# Patient Record
Sex: MALE | Race: BLACK OR AFRICAN AMERICAN | NOT HISPANIC OR LATINO | Employment: UNEMPLOYED | URBAN - METROPOLITAN AREA
[De-identification: names, ages, dates, MRNs, and addresses within clinical notes are randomized per-mention and may not be internally consistent; named-entity substitution may affect disease eponyms.]

---

## 2017-03-02 PROBLEM — R57.0 CARDIOGENIC SHOCK: Status: ACTIVE | Noted: 2017-03-02

## 2017-03-03 ENCOUNTER — ANESTHESIA (OUTPATIENT)
Dept: MEDSURG UNIT | Facility: HOSPITAL | Age: 37
DRG: 291 | End: 2017-03-03
Payer: MEDICARE

## 2017-03-03 ENCOUNTER — HOSPITAL ENCOUNTER (INPATIENT)
Facility: HOSPITAL | Age: 37
LOS: 21 days | Discharge: HOME HEALTH | DRG: 291 | End: 2017-03-24
Attending: INTERNAL MEDICINE | Admitting: INTERNAL MEDICINE
Payer: MEDICARE

## 2017-03-03 ENCOUNTER — ANESTHESIA EVENT (OUTPATIENT)
Dept: MEDSURG UNIT | Facility: HOSPITAL | Age: 37
DRG: 291 | End: 2017-03-03
Payer: MEDICARE

## 2017-03-03 VITALS — RESPIRATION RATE: 13 BRPM

## 2017-03-03 DIAGNOSIS — G40.909 SEIZURE DISORDER: ICD-10-CM

## 2017-03-03 DIAGNOSIS — N18.9 ACUTE ON CHRONIC RENAL INSUFFICIENCY: ICD-10-CM

## 2017-03-03 DIAGNOSIS — I50.23 ACUTE ON CHRONIC SYSTOLIC CONGESTIVE HEART FAILURE: ICD-10-CM

## 2017-03-03 DIAGNOSIS — N28.9 ACUTE ON CHRONIC RENAL INSUFFICIENCY: ICD-10-CM

## 2017-03-03 DIAGNOSIS — R57.0 CARDIOGENIC SHOCK: Primary | ICD-10-CM

## 2017-03-03 DIAGNOSIS — E88.41 MELAS (MITOCHONDRIAL ENCEPHALOPATHY, LACTIC ACIDOSIS AND STROKE-LIKE EPISODES): ICD-10-CM

## 2017-03-03 DIAGNOSIS — I48.3 TYPICAL ATRIAL FLUTTER: ICD-10-CM

## 2017-03-03 DIAGNOSIS — Z95.810 PRESENCE OF AUTOMATIC IMPLANTABLE CARDIOVERTER-DEFIBRILLATOR: ICD-10-CM

## 2017-03-03 DIAGNOSIS — I42.9 CARDIOMYOPATHY, UNSPECIFIED TYPE: Primary | ICD-10-CM

## 2017-03-03 DIAGNOSIS — R41.89 COGNITIVE IMPAIRMENT: ICD-10-CM

## 2017-03-03 DIAGNOSIS — I42.8 NICM (NONISCHEMIC CARDIOMYOPATHY): ICD-10-CM

## 2017-03-03 DIAGNOSIS — N18.4 CKD (CHRONIC KIDNEY DISEASE) STAGE 4, GFR 15-29 ML/MIN: Chronic | ICD-10-CM

## 2017-03-03 DIAGNOSIS — G81.04 FLACCID HEMIPLEGIA AFFECTING LEFT NONDOMINANT SIDE: ICD-10-CM

## 2017-03-03 PROBLEM — I48.92 ATRIAL FLUTTER: Status: ACTIVE | Noted: 2017-03-03

## 2017-03-03 PROBLEM — R74.01 TRANSAMINITIS: Status: ACTIVE | Noted: 2017-03-03

## 2017-03-03 PROBLEM — D69.6 THROMBOCYTOPENIA: Status: ACTIVE | Noted: 2017-03-03

## 2017-03-03 PROBLEM — D68.9 COAGULOPATHY: Status: ACTIVE | Noted: 2017-03-03

## 2017-03-03 PROBLEM — E88.09 HYPOALBUMINEMIA: Status: ACTIVE | Noted: 2017-03-03

## 2017-03-03 LAB
ABO + RH BLD: NORMAL
ALBUMIN SERPL BCP-MCNC: 2.7 G/DL
ALBUMIN SERPL BCP-MCNC: 2.9 G/DL
ALLENS TEST: ABNORMAL
ALP SERPL-CCNC: 163 U/L
ALP SERPL-CCNC: 173 U/L
ALT SERPL W/O P-5'-P-CCNC: 711 U/L
ALT SERPL W/O P-5'-P-CCNC: 757 U/L
ANION GAP SERPL CALC-SCNC: 13 MMOL/L
ANION GAP SERPL CALC-SCNC: 16 MMOL/L
ANISOCYTOSIS BLD QL SMEAR: SLIGHT
APTT BLDCRRT: 30.1 SEC
AST SERPL-CCNC: 484 U/L
AST SERPL-CCNC: 645 U/L
BASOPHILS # BLD AUTO: 0 K/UL
BASOPHILS NFR BLD: 0 %
BILIRUB SERPL-MCNC: 4.1 MG/DL
BILIRUB SERPL-MCNC: 4.3 MG/DL
BLD GP AB SCN CELLS X3 SERPL QL: NORMAL
BNP SERPL-MCNC: 2508 PG/ML
BUN SERPL-MCNC: 88 MG/DL
BUN SERPL-MCNC: 90 MG/DL
CALCIUM SERPL-MCNC: 8.1 MG/DL
CALCIUM SERPL-MCNC: 8.4 MG/DL
CHLORIDE SERPL-SCNC: 95 MMOL/L
CHLORIDE SERPL-SCNC: 99 MMOL/L
CO2 SERPL-SCNC: 25 MMOL/L
CO2 SERPL-SCNC: 29 MMOL/L
CREAT SERPL-MCNC: 3 MG/DL
CREAT SERPL-MCNC: 3 MG/DL
DELSYS: ABNORMAL
DIASTOLIC DYSFUNCTION: YES
DIFFERENTIAL METHOD: ABNORMAL
EOSINOPHIL # BLD AUTO: 0 K/UL
EOSINOPHIL NFR BLD: 0.2 %
ERYTHROCYTE [DISTWIDTH] IN BLOOD BY AUTOMATED COUNT: 15.2 %
ERYTHROCYTE [SEDIMENTATION RATE] IN BLOOD BY WESTERGREN METHOD: 16 MM/H
EST. GFR  (AFRICAN AMERICAN): 29.5 ML/MIN/1.73 M^2
EST. GFR  (AFRICAN AMERICAN): 29.5 ML/MIN/1.73 M^2
EST. GFR  (NON AFRICAN AMERICAN): 25.5 ML/MIN/1.73 M^2
EST. GFR  (NON AFRICAN AMERICAN): 25.5 ML/MIN/1.73 M^2
ESTIMATED PA SYSTOLIC PRESSURE: 37
FACT X PPP CHRO-ACNC: 0.66 IU/ML
FIO2: 21
FLOW: 0
GLOBAL PERICARDIAL EFFUSION: ABNORMAL
GLUCOSE SERPL-MCNC: 161 MG/DL
GLUCOSE SERPL-MCNC: 208 MG/DL
HCO3 UR-SCNC: 31.9 MMOL/L (ref 24–28)
HCO3 UR-SCNC: 33.6 MMOL/L (ref 24–28)
HCO3 UR-SCNC: 35.6 MMOL/L (ref 24–28)
HCT VFR BLD AUTO: 37.7 %
HGB BLD-MCNC: 12.9 G/DL
HYPOCHROMIA BLD QL SMEAR: ABNORMAL
INR PPP: 1.7
LACTATE SERPL-SCNC: 1.8 MMOL/L
LYMPHOCYTES # BLD AUTO: 1.2 K/UL
LYMPHOCYTES NFR BLD: 9.9 %
MAGNESIUM SERPL-MCNC: 2 MG/DL
MCH RBC QN AUTO: 30.6 PG
MCHC RBC AUTO-ENTMCNC: 34.2 %
MCV RBC AUTO: 90 FL
METHEMOGLOBIN: 0.6 % (ref 0–3)
MITRAL VALVE REGURGITATION: ABNORMAL
MODE: ABNORMAL
MONOCYTES # BLD AUTO: 1.5 K/UL
MONOCYTES NFR BLD: 12.3 %
NEUTROPHILS # BLD AUTO: 9.6 K/UL
NEUTROPHILS NFR BLD: 77.2 %
PCO2 BLDA: 49.1 MMHG (ref 35–45)
PCO2 BLDA: 50.8 MMHG (ref 35–45)
PCO2 BLDA: 57.8 MMHG (ref 35–45)
PH SMN: 7.4 [PH] (ref 7.35–7.45)
PH SMN: 7.42 [PH] (ref 7.35–7.45)
PH SMN: 7.43 [PH] (ref 7.35–7.45)
PHOSPHATE SERPL-MCNC: 3.8 MG/DL
PLATELET # BLD AUTO: 71 K/UL
PLATELET BLD QL SMEAR: ABNORMAL
PMV BLD AUTO: ABNORMAL FL
PO2 BLDA: 26 MMHG (ref 40–60)
PO2 BLDA: 27 MMHG (ref 40–60)
PO2 BLDA: 28 MMHG (ref 40–60)
POC BE: 11 MMOL/L
POC BE: 7 MMOL/L
POC BE: 9 MMOL/L
POC SATURATED O2: 47 % (ref 95–100)
POC SATURATED O2: 48 % (ref 95–100)
POC SATURATED O2: 53 % (ref 95–100)
POC TCO2: 33 MMOL/L (ref 24–29)
POC TCO2: 35 MMOL/L (ref 24–29)
POC TCO2: 37 MMOL/L (ref 24–29)
POLYCHROMASIA BLD QL SMEAR: ABNORMAL
POTASSIUM SERPL-SCNC: 4.4 MMOL/L
POTASSIUM SERPL-SCNC: 4.5 MMOL/L
PROT SERPL-MCNC: 5.4 G/DL
PROT SERPL-MCNC: 5.6 G/DL
PROTHROMBIN TIME: 16.8 SEC
RBC # BLD AUTO: 4.21 M/UL
RETIRED EF AND QEF - SEE NOTES: 10 (ref 55–65)
SAMPLE: ABNORMAL
SITE: ABNORMAL
SODIUM SERPL-SCNC: 137 MMOL/L
SODIUM SERPL-SCNC: 140 MMOL/L
TRICUSPID VALVE REGURGITATION: ABNORMAL
WBC # BLD AUTO: 12.42 K/UL

## 2017-03-03 PROCEDURE — 80053 COMPREHEN METABOLIC PANEL: CPT

## 2017-03-03 PROCEDURE — 25000003 PHARM REV CODE 250: Performed by: INTERNAL MEDICINE

## 2017-03-03 PROCEDURE — 93282 PRGRMG EVAL IMPLANTABLE DFB: CPT

## 2017-03-03 PROCEDURE — 25000003 PHARM REV CODE 250: Performed by: NURSE ANESTHETIST, CERTIFIED REGISTERED

## 2017-03-03 PROCEDURE — 63600175 PHARM REV CODE 636 W HCPCS: Performed by: INTERNAL MEDICINE

## 2017-03-03 PROCEDURE — 85025 COMPLETE CBC W/AUTO DIFF WBC: CPT

## 2017-03-03 PROCEDURE — 84100 ASSAY OF PHOSPHORUS: CPT

## 2017-03-03 PROCEDURE — C8929 TTE W OR WO FOL WCON,DOPPLER: HCPCS

## 2017-03-03 PROCEDURE — 37000009 HC ANESTHESIA EA ADD 15 MINS: Performed by: INTERNAL MEDICINE

## 2017-03-03 PROCEDURE — 99233 SBSQ HOSP IP/OBS HIGH 50: CPT | Mod: ,,, | Performed by: INTERNAL MEDICINE

## 2017-03-03 PROCEDURE — 37000008 HC ANESTHESIA 1ST 15 MINUTES: Performed by: INTERNAL MEDICINE

## 2017-03-03 PROCEDURE — 93306 TTE W/DOPPLER COMPLETE: CPT | Mod: 26,,, | Performed by: INTERNAL MEDICINE

## 2017-03-03 PROCEDURE — 80053 COMPREHEN METABOLIC PANEL: CPT | Mod: 91

## 2017-03-03 PROCEDURE — 99223 1ST HOSP IP/OBS HIGH 75: CPT | Mod: ,,, | Performed by: INTERNAL MEDICINE

## 2017-03-03 PROCEDURE — D9220A PRA ANESTHESIA: Mod: CRNA,,, | Performed by: NURSE ANESTHETIST, CERTIFIED REGISTERED

## 2017-03-03 PROCEDURE — 83735 ASSAY OF MAGNESIUM: CPT

## 2017-03-03 PROCEDURE — D9220A PRA ANESTHESIA: Mod: ANES,,, | Performed by: ANESTHESIOLOGY

## 2017-03-03 PROCEDURE — 93282 PRGRMG EVAL IMPLANTABLE DFB: CPT | Mod: 26,,, | Performed by: INTERNAL MEDICINE

## 2017-03-03 PROCEDURE — 93325 DOPPLER ECHO COLOR FLOW MAPG: CPT

## 2017-03-03 PROCEDURE — 63600175 PHARM REV CODE 636 W HCPCS

## 2017-03-03 PROCEDURE — 83605 ASSAY OF LACTIC ACID: CPT

## 2017-03-03 PROCEDURE — 86850 RBC ANTIBODY SCREEN: CPT

## 2017-03-03 PROCEDURE — 85610 PROTHROMBIN TIME: CPT

## 2017-03-03 PROCEDURE — 82803 BLOOD GASES ANY COMBINATION: CPT

## 2017-03-03 PROCEDURE — 27100094 HC IABP, SET-UP

## 2017-03-03 PROCEDURE — 63600367 HC NITRIC OXIDE PER HOUR

## 2017-03-03 PROCEDURE — 94761 N-INVAS EAR/PLS OXIMETRY MLT: CPT

## 2017-03-03 PROCEDURE — 93325 DOPPLER ECHO COLOR FLOW MAPG: CPT | Mod: 26,,, | Performed by: INTERNAL MEDICINE

## 2017-03-03 PROCEDURE — 99900035 HC TECH TIME PER 15 MIN (STAT)

## 2017-03-03 PROCEDURE — 5A2204Z RESTORATION OF CARDIAC RHYTHM, SINGLE: ICD-10-PCS | Performed by: INTERNAL MEDICINE

## 2017-03-03 PROCEDURE — 85520 HEPARIN ASSAY: CPT

## 2017-03-03 PROCEDURE — 86900 BLOOD TYPING SEROLOGIC ABO: CPT

## 2017-03-03 PROCEDURE — 63600175 PHARM REV CODE 636 W HCPCS: Performed by: NURSE ANESTHETIST, CERTIFIED REGISTERED

## 2017-03-03 PROCEDURE — 83880 ASSAY OF NATRIURETIC PEPTIDE: CPT

## 2017-03-03 PROCEDURE — 93320 DOPPLER ECHO COMPLETE: CPT | Mod: 26,,, | Performed by: INTERNAL MEDICINE

## 2017-03-03 PROCEDURE — 25000003 PHARM REV CODE 250

## 2017-03-03 PROCEDURE — 93010 ELECTROCARDIOGRAM REPORT: CPT | Mod: 76,,, | Performed by: INTERNAL MEDICINE

## 2017-03-03 PROCEDURE — 93312 ECHO TRANSESOPHAGEAL: CPT | Mod: 26,,, | Performed by: INTERNAL MEDICINE

## 2017-03-03 PROCEDURE — 99223 1ST HOSP IP/OBS HIGH 75: CPT | Mod: GC,,, | Performed by: PSYCHIATRY & NEUROLOGY

## 2017-03-03 PROCEDURE — 85730 THROMBOPLASTIN TIME PARTIAL: CPT

## 2017-03-03 PROCEDURE — C9113 INJ PANTOPRAZOLE SODIUM, VIA: HCPCS | Performed by: INTERNAL MEDICINE

## 2017-03-03 PROCEDURE — 20000000 HC ICU ROOM

## 2017-03-03 RX ORDER — FENTANYL CITRATE 50 UG/ML
INJECTION, SOLUTION INTRAMUSCULAR; INTRAVENOUS
Status: DISCONTINUED | OUTPATIENT
Start: 2017-03-03 | End: 2017-03-03

## 2017-03-03 RX ORDER — PHENYLEPHRINE HYDROCHLORIDE 10 MG/ML
INJECTION INTRAVENOUS
Status: COMPLETED
Start: 2017-03-03 | End: 2017-03-03

## 2017-03-03 RX ORDER — KETAMINE HYDROCHLORIDE 100 MG/ML
INJECTION, SOLUTION INTRAMUSCULAR; INTRAVENOUS
Status: DISCONTINUED | OUTPATIENT
Start: 2017-03-03 | End: 2017-03-03

## 2017-03-03 RX ORDER — DOBUTAMINE HYDROCHLORIDE 400 MG/100ML
5 INJECTION INTRAVENOUS CONTINUOUS
Status: DISCONTINUED | OUTPATIENT
Start: 2017-03-03 | End: 2017-03-07

## 2017-03-03 RX ORDER — DOBUTAMINE HYDROCHLORIDE 400 MG/100ML
5 INJECTION, SOLUTION INTRAVENOUS CONTINUOUS
Status: DISCONTINUED | OUTPATIENT
Start: 2017-03-03 | End: 2017-03-03

## 2017-03-03 RX ORDER — PROPOFOL 10 MG/ML
VIAL (ML) INTRAVENOUS
Status: DISCONTINUED | OUTPATIENT
Start: 2017-03-03 | End: 2017-03-03

## 2017-03-03 RX ORDER — PANTOPRAZOLE SODIUM 40 MG/10ML
40 INJECTION, POWDER, LYOPHILIZED, FOR SOLUTION INTRAVENOUS DAILY
Status: DISCONTINUED | OUTPATIENT
Start: 2017-03-03 | End: 2017-03-06

## 2017-03-03 RX ORDER — SODIUM CHLORIDE 0.9 % (FLUSH) 0.9 %
3 SYRINGE (ML) INJECTION EVERY 8 HOURS
Status: DISCONTINUED | OUTPATIENT
Start: 2017-03-03 | End: 2017-03-24 | Stop reason: HOSPADM

## 2017-03-03 RX ORDER — SODIUM CHLORIDE 9 MG/ML
INJECTION, SOLUTION INTRAVENOUS CONTINUOUS PRN
Status: DISCONTINUED | OUTPATIENT
Start: 2017-03-03 | End: 2017-03-03

## 2017-03-03 RX ORDER — EPINEPHRINE 0.22MG
400 AEROSOL WITH ADAPTER (ML) INHALATION DAILY
Status: DISCONTINUED | OUTPATIENT
Start: 2017-03-03 | End: 2017-03-24 | Stop reason: HOSPADM

## 2017-03-03 RX ORDER — LEVOCARNITINE ORAL SOLUTION (SUGUAR FREE) 1 G/10 ML 1 G/10ML
330 SOLUTION ORAL 2 TIMES DAILY
Status: DISCONTINUED | OUTPATIENT
Start: 2017-03-03 | End: 2017-03-24 | Stop reason: HOSPADM

## 2017-03-03 RX ORDER — LEVETIRACETAM 750 MG/1
750 TABLET ORAL 2 TIMES DAILY
Status: DISCONTINUED | OUTPATIENT
Start: 2017-03-03 | End: 2017-03-24 | Stop reason: HOSPADM

## 2017-03-03 RX ORDER — FUROSEMIDE 10 MG/ML
80 INJECTION INTRAMUSCULAR; INTRAVENOUS ONCE
Status: COMPLETED | OUTPATIENT
Start: 2017-03-03 | End: 2017-03-03

## 2017-03-03 RX ORDER — LEVOCARNITINE 1 G/10ML
330 SOLUTION ORAL 2 TIMES DAILY
Status: DISCONTINUED | OUTPATIENT
Start: 2017-03-03 | End: 2017-03-03

## 2017-03-03 RX ORDER — HEPARIN SODIUM,PORCINE/D5W 25000/250
17 INTRAVENOUS SOLUTION INTRAVENOUS CONTINUOUS
Status: DISCONTINUED | OUTPATIENT
Start: 2017-03-03 | End: 2017-03-19

## 2017-03-03 RX ADMIN — FUROSEMIDE 80 MG: 10 INJECTION, SOLUTION INTRAMUSCULAR; INTRAVENOUS at 07:03

## 2017-03-03 RX ADMIN — EPINEPHRINE 0.04 MCG/KG/MIN: 1 INJECTION PARENTERAL at 03:03

## 2017-03-03 RX ADMIN — PROPOFOL 10 MG: 10 INJECTION, EMULSION INTRAVENOUS at 11:03

## 2017-03-03 RX ADMIN — LEVOCARNITINE 3.3 ML: 1 SOLUTION ORAL at 11:03

## 2017-03-03 RX ADMIN — Medication 3 ML: at 10:03

## 2017-03-03 RX ADMIN — SODIUM CHLORIDE: 0.9 INJECTION, SOLUTION INTRAVENOUS at 11:03

## 2017-03-03 RX ADMIN — SODIUM CHLORIDE 20 MG/HR: 900 INJECTION, SOLUTION INTRAVENOUS at 11:03

## 2017-03-03 RX ADMIN — Medication 3 ML: at 02:03

## 2017-03-03 RX ADMIN — HEPARIN SODIUM AND DEXTROSE 17 UNITS/KG/HR: 10000; 5 INJECTION INTRAVENOUS at 07:03

## 2017-03-03 RX ADMIN — SODIUM CHLORIDE 20 MG/HR: 900 INJECTION, SOLUTION INTRAVENOUS at 07:03

## 2017-03-03 RX ADMIN — LEVETIRACETAM 750 MG: 750 TABLET, FILM COATED ORAL at 09:03

## 2017-03-03 RX ADMIN — PHENYLEPHRINE HYDROCHLORIDE: 10 INJECTION INTRAVENOUS at 07:03

## 2017-03-03 RX ADMIN — PANTOPRAZOLE SODIUM 40 MG: 40 INJECTION, POWDER, FOR SOLUTION INTRAVENOUS at 09:03

## 2017-03-03 RX ADMIN — FENTANYL CITRATE 100 MCG: 50 INJECTION, SOLUTION INTRAMUSCULAR; INTRAVENOUS at 11:03

## 2017-03-03 RX ADMIN — Medication 3 ML: at 07:03

## 2017-03-03 RX ADMIN — DOBUTAMINE IN DEXTROSE 5 MCG/KG/MIN: 400 INJECTION, SOLUTION INTRAVENOUS at 07:03

## 2017-03-03 RX ADMIN — KETAMINE HYDROCHLORIDE 20 MG: 100 INJECTION, SOLUTION, CONCENTRATE INTRAMUSCULAR; INTRAVENOUS at 11:03

## 2017-03-03 RX ADMIN — HEPARIN SODIUM AND DEXTROSE 17 UNITS/KG/HR: 10000; 5 INJECTION INTRAVENOUS at 09:03

## 2017-03-03 NOTE — CONSULTS
[facility]  Vascular Neurology  Comprehensive Stroke Center  Consult Note      Consults  Subjective:     History of Present Illness:  Mr. Conde is a 37 yo male with history of HFr HFrEF (EF <10%) 2/2 NICM s/p AICD (implanted 12/2013), MELAS (mitochondrial encephalopathy, lactic acidosis, and stroke-like episodes), Hx of LV thrombus, Atrial Flutter, CKD, seizure disorder who was transferred to [facility] from [facility] in cardiogenic shock and consideration for heart transplant and/or advanced options. Vascular neurology consulted evaluation for LVAD/ Transplant candidacy.     He was admitted to [facility] on 2/28 after being found down. CT head done there showed no acute CVA or hemorrhage and an old R PCA stroke. He was found to be in cardiogenic shock with hypotension and volume overload and transferred to [facility] for consideration for heart transplant vs advanced options.     He is followed by Dr. Ruiz with neurology at [facility]. Per chart review he was diagnosed with MELAS in around 1999 after multiple stroke like episodes.  Definitive studies included MRS (lactate peak) and a muscle biopsy positive for ragged red fibers. He was started on CoQ and levocarnitine and initially did well until several years ago when he developed heart failure. According to Dr. Ruiz's note at [facility] on 3/2 there has not been any other significant system or organ involvement besides brain and heart. He has also not had stroke like episodes in more than 10 years. He also takes Keppra for seizures.     Upon my evaluation patient patient was tachy ~ 160's and being cardioverted by the cardiology team. TTE performed prior was negative for thrombus.       Social History   Substance Use Topics    Smoking status: None    Smokeless tobacco: None    Alcohol use None     Review of patient's allergies indicates:  No Known Allergies  Medications: I have reviewed the current medication administration record.    No prescriptions prior to admission.        Review of Systems   Unable to perform ROS: Unstable vital signs     Objective:     Vital Signs (Most Recent):  Temp: 97.1 °F (36.2 °C) (03/03/17 1200)  Pulse: 83 (03/03/17 1300)  Resp: 12 (03/03/17 1300)  BP: (!) 97/53 (03/03/17 0500)  SpO2: 100 % (03/03/17 1300)    Vital Signs Range (Last 24H):  Temp:  [97.1 °F (36.2 °C)-98.9 °F (37.2 °C)]   Pulse:  []   Resp:  [7-31]   BP: ()/(53-70)   SpO2:  [94 %-100 %]   Arterial Line BP: ()/(45-54)     Physical Exam   Constitutional: He appears distressed.   HENT:   Head: Normocephalic and atraumatic.   Cardiovascular:   Tachycardic to 160's    Neurological: GCS eye subscore is 4 - spontaneous. GCS verbal subscore is 5 - oriented. GCS motor subscore is 6 - obeys commands.   Unable to assess due to unstable vital signs and cardioversion taking place.            Hussein Coma Scale:  Best Eye Response: 4 - spontaneous  Best Motor Response: 6 - obeys commands  Best Verbal Response: 5 - oriented  Hussein Coma Scale Total: 15      Laboratory:  CMP:   Recent Labs  Lab 03/03/17  0630   CALCIUM 8.1*   ALBUMIN 2.7*   PROT 5.4*      K 4.5   CO2 25   CL 99   BUN 90*   CREATININE 3.0*   ALKPHOS 163*   *   *   BILITOT 4.1*     CBC:   Recent Labs  Lab 03/03/17  0630   WBC 12.42   RBC 4.21*   HGB 12.9*   HCT 37.7*   PLT 71*   MCV 90   MCH 30.6   MCHC 34.2     Lipid Panel: No results for input(s): CHOL, LDLCALC, HDL, TRIG in the last 168 hours.  Coagulation:   Recent Labs  Lab 03/03/17  0630   INR 1.7*   APTT 30.1     Hgb A1C: No results for input(s): HGBA1C in the last 168 hours.  TSH: No results for input(s): TSH in the last 168 hours.    Diagnostic Results:  Brain Imaging: None on file.     Cerebrovascular Imaging: None on file.     Cervical Vascular Imaging: None on file.     Cardiac Evaluation:   RENÉE 3/3:   CONCLUSIONS     1 - Enlarged left ventricular enlargement.     2 - Severely depressed left ventricular systolic function (EF 10 % or less).      3 - Moderately depressed right ventricular function .     4 - Moderate mitral regurgitation.     5 - Moderate to severe tricuspid regurgitation.     6 - Left atrial appendage is single lobed with no visualized thrombus.     7 - Biatrial enlargement.     EKG 3/3: Atrial flutter with 2:1 A-V conduction HR ~ 140     Assessment/Plan:     Patient is a 36 y.o. year old male with:    * MELAS (mitochondrial encephalopathy, lactic acidosis and stroke-like episodes)  Patient with history of MELAS now with with cardiogenic shock 2/2 HFrEF from [facility] transferred to [facility] for consideration for heart transplant and/or advanced options. Vascular neurology consulted for candidacy for these options.     Recommendations:   -After reviewing the very limited literature available on the topic there seems to be no contraindication for transplant or advanced options  in this patient from vascular neurology perspective. Does not seem to be significant systemic or organ involvement besides brain and heart.   -continue CoQ and levocarnitine  -monitor neuro exam closely, do not hesitate to call with any changes in exam    Thank you for the consult.     HOLLY Veras  Comprehensive Stroke Center  Department of Vascular Neurology   [facility]

## 2017-03-03 NOTE — PLAN OF CARE
Problem: Patient Care Overview  Goal: Plan of Care Review  Outcome: Ongoing (interventions implemented as appropriate)  Plan of care updated and reviewed with pt. VS and assessments per flowsheet. No complaints of chest pain. Pt on Dobut, Lasix and Heparin. IABP in R groin 1:1, ECG, Auto, Aug 80s. Site free of redness, swelling, and/or hematoma. Continue to monitor.

## 2017-03-03 NOTE — PROGRESS NOTES
At the request of Dr. Carpenter, I have been asked to meet patient and provide VAD education. Introduced self and reason for visit. Pt sedated with no family at bedside.  Provided written VAD education (red folder). Included in folder is the following: VAD education, wellness contract, acknowledgement of being evaluated for VAD, support group flier, ICU visitation hours, VAD newsletter, Intermacs information, picture of VAD  In body, [employer] pamphlet, Living with HM II DVD, There is hope pamphlet, Tomorrow depends on the decision we make today patient education pack (HM II), [employer] information, and business cards for all VAD coordinators.      Red folder was placed on the bedside table.

## 2017-03-03 NOTE — SUBJECTIVE & OBJECTIVE
History reviewed. No pertinent past medical history.  History reviewed. No pertinent surgical history.  History reviewed. No pertinent family history.  Social History   Substance Use Topics    Smoking status: None    Smokeless tobacco: None    Alcohol use None     Review of patient's allergies indicates:  No Known Allergies  Medications: I have reviewed the current medication administration record.    No prescriptions prior to admission.       Review of Systems   Unable to perform ROS: Unstable vital signs     Objective:     Vital Signs (Most Recent):  Temp: 97.1 °F (36.2 °C) (03/03/17 1200)  Pulse: 83 (03/03/17 1300)  Resp: 12 (03/03/17 1300)  BP: (!) 97/53 (03/03/17 0500)  SpO2: 100 % (03/03/17 1300)    Vital Signs Range (Last 24H):  Temp:  [97.1 °F (36.2 °C)-98.9 °F (37.2 °C)]   Pulse:  []   Resp:  [7-31]   BP: ()/(53-70)   SpO2:  [94 %-100 %]   Arterial Line BP: ()/(45-54)     Physical Exam   Constitutional: He appears distressed.   HENT:   Head: Normocephalic and atraumatic.   Cardiovascular:   Tachycardic to 160's    Neurological: GCS eye subscore is 4 - spontaneous. GCS verbal subscore is 5 - oriented. GCS motor subscore is 6 - obeys commands.   Unable to assess due to unstable vital signs and cardioversion taking place.            Hussein Coma Scale:  Best Eye Response: 4 - spontaneous  Best Motor Response: 6 - obeys commands  Best Verbal Response: 5 - oriented  Hussein Coma Scale Total: 15      Laboratory:  CMP:   Recent Labs  Lab 03/03/17  0630   CALCIUM 8.1*   ALBUMIN 2.7*   PROT 5.4*      K 4.5   CO2 25   CL 99   BUN 90*   CREATININE 3.0*   ALKPHOS 163*   *   *   BILITOT 4.1*     CBC:   Recent Labs  Lab 03/03/17  0630   WBC 12.42   RBC 4.21*   HGB 12.9*   HCT 37.7*   PLT 71*   MCV 90   MCH 30.6   MCHC 34.2     Lipid Panel: No results for input(s): CHOL, LDLCALC, HDL, TRIG in the last 168 hours.  Coagulation:   Recent Labs  Lab 03/03/17  0630   INR 1.7*   APTT  30.1     Hgb A1C: No results for input(s): HGBA1C in the last 168 hours.  TSH: No results for input(s): TSH in the last 168 hours.    Diagnostic Results:  Brain Imaging: None on file.     Cerebrovascular Imaging: None on file.     Cervical Vascular Imaging: None on file.     Cardiac Evaluation:   RENÉE 3/3:   CONCLUSIONS     1 - Enlarged left ventricular enlargement.     2 - Severely depressed left ventricular systolic function (EF 10 % or less).     3 - Moderately depressed right ventricular function .     4 - Moderate mitral regurgitation.     5 - Moderate to severe tricuspid regurgitation.     6 - Left atrial appendage is single lobed with no visualized thrombus.     7 - Biatrial enlargement.     EKG 3/3: Atrial flutter with 2:1 A-V conduction HR ~ 140

## 2017-03-03 NOTE — PROGRESS NOTES
Administered definity contrast to assist with Echo. Allergies verified and consent obtained from Deisi Sewell (sister) by phone. NAD noted. IV flushed.

## 2017-03-03 NOTE — PROGRESS NOTES
Brief Note    See H&P from this AM for further details. Patient was successfully cardioverted to NSR this AM after RENÉE ruled out thrombus. Oral airway in place, O2 sats dropped into mid 80s on 3L and MAP down to high 50s after initial improvement post DCCV. Likely related to sedatives. Will continue to monitor    IABP back to 1:1 now that not in flutter. Lasix at 20mg/hr +  with outstanding UOP. Last CVP 11. SvO2 45.    Neurology consulted for ?candidacy for possible advanced options.    Will check axillary US for possibility of axillary IABP in future to encourage ambulation.    Savanah CALLAHAN

## 2017-03-03 NOTE — TRANSFER OF CARE
Anesthesia Transfer of Care Note    Patient: Binh Conde    Procedure(s) Performed: Procedure(s) (LRB):  CARDIOVERSION (N/A)    Patient location: ICU    Anesthesia Type: general    Transport from OR: Transported from OR on 2-3 L/min O2 by NC with adequate spontaneous ventilation    Post pain: adequate analgesia    Post assessment: no apparent anesthetic complications and tolerated procedure well    Post vital signs: stable    Level of consciousness: sedated    Nausea/Vomiting: no nausea/vomiting    Complications: none          Last vitals:   Visit Vitals    BP (!) 97/53    Pulse (!) 140    Temp 37.2 °C (98.9 °F) (Core)    Resp 17    Ht 6' (1.829 m)    Wt 63.4 kg (139 lb 12.4 oz)    SpO2 100%    BMI 18.96 kg/m2

## 2017-03-03 NOTE — CONSULTS
Electrophysiology Consult Note  Attending Physician: Romelia Carpenter MD  Reason for Consult: Typical flutter     HPI:   36 y.o.  male with PMH of HFrEF (EF <10%)  NICM s/p ICD (implanted 12/2013), MELAS (mitochondrial encephalopathy, lactic acidosis, and stroke-like episodes), Hx of LV thrombus, CKD, seizure disorder, who is now transferred to [facility] from [facility] in cardiogenic shock and consideration for heart transplant and/or advanced options.EP consulted typical AFlutter 2:1 AV conduction rates 120's to 140's.Patient had RENÉE+DCCV today with restoration of NSR with 1:1 conduction, device interrogated and he has single lead ICD with no shocks.Currently patient MAP in 60's he is on DBT@5, IABP 2:1, heparin gtt and lasix gtt.     ROS:    Unable to assess patient still under effect of sedation from RENÉE  PMH:   History reviewed. No pertinent past medical history.  History reviewed. No pertinent surgical history.  Allergies:   Review of patient's allergies indicates:  No Known Allergies  Medications:     No current facility-administered medications on file prior to encounter.      No current outpatient prescriptions on file prior to encounter.       Inpatient Medications   Continuous Infusions:   DOBUTamine 5 mcg/kg/min (03/03/17 1400)    furosemide (LASIX) 5 mg/mL infusion (non-titrating) 20 mg/hr (03/03/17 1400)    heparin (porcine) in D5W 17 Units/kg/hr (03/03/17 1400)     Scheduled Meds:   coenzyme Q10  400 mg Oral Daily    levetiracetam  750 mg Oral BID    levocarnitine  330 mg Oral BID    pantoprazole  40 mg Intravenous Daily    sodium chloride 0.9%  3 mL Intravenous Q8H     PRN Meds:heparin (PORCINE), heparin (PORCINE)     Social History:     Social History   Substance Use Topics    Smoking status: Not on file    Smokeless tobacco: Not on file    Alcohol use Not on file     Family History:   History reviewed. No pertinent family history.  Physical Exam:     Vitals:  Temp:  [97.1 °F  (36.2 °C)-98.9 °F (37.2 °C)]   Pulse:  []   Resp:  [7-31]   BP: ()/(53-70)   SpO2:  [94 %-100 %]   Arterial Line BP: ()/(45-54)  on  I/O's:    Intake/Output Summary (Last 24 hours) at 03/03/17 1444  Last data filed at 03/03/17 1432   Gross per 24 hour   Intake           361.76 ml   Output             3770 ml   Net         -3408.24 ml        Constitutional: AMS secondary to sedation effect from RENÉE  HEENT: Sclera anicteric, PERRLA, EOMI  Neck: 10-12 cm JVD, no carotid bruits  CV: RRR, no murmur, normal S1/S2  Pulm: CTAB, no wheezes, rales, or ronchi  GI: Abdomen soft, NTND, +BS  Extremities: No LE edema, warm and well perfused  Skin: No ecchymosis, erythema, or ulcers  Psych: AOx3, appropriate affect  Neuro: CNII-XII intact, no focal deficits    Labs:       Recent Labs  Lab 03/03/17  0630      K 4.5   CL 99   CO2 25   BUN 90*   CREATININE 3.0*   ANIONGAP 13       Recent Labs  Lab 03/03/17  0630   *   *   ALKPHOS 163*   BILITOT 4.1*   ALBUMIN 2.7*       Recent Labs  Lab 03/03/17  0630   BNP 2508*      Recent Labs  Lab 03/03/17  0630   WBC 12.42   HGB 12.9*   HCT 37.7*   PLT 71*   GRAN 77.2*  9.6*       Recent Labs  Lab 03/03/17  0630   INR 1.7*     No results found for: CHOL, HDL, LDLCALC, TRIG  No results found for: HGBA1C     Micro:  Blood Cultures  No results found for: LABBLOO  Urine Cultures  No results found for: LABURIN    Imaging:         EF   Date Value Ref Range Status   03/03/2017 10 (A) 55 - 65    03/03/2017 10 (A) 55 - 65          Telemetry: NSR with 1:1 conduction HR in 90's    Assessment&Plan:   36 y.o. gentleman with PMH of HFrEF (EF <10%) 2/2 NICM s/p ICD (implanted 12/2013), MELAS (mitochondrial encephalopathy, lactic acidosis, and stroke-like episodes), Hx of LV thrombus,CKD, seizure disorder, who is now transferred to [facility] from [facility] in cardiogenic shock and consideration for heart transplant and/or advanced options.EP consulted for typical Aflutter with 2:1  block:    -S/P successful RENÉE+DCCV   -Continue on anticoagulation   -ICD interrogated and he has [employer] single lead ICD with no prior shocks and some run on NSVT   -Will need CTI RFA for typical AFlutter when more stable prior to discharge   -EP will follow            Thank you for your consult     Attending addendum to follow         Lazaro Harden MD  Cardiology Fellow  Pager: [phone]

## 2017-03-03 NOTE — PROGRESS NOTES
SvO2 remains 48. Will add 20ppm nitric oxide and epinephrine at 0.04. Remains NSR, MAP in mid 70s    Savanah CALLAHAN

## 2017-03-03 NOTE — ASSESSMENT & PLAN NOTE
Patient with history of MELAS transferred with cardiogenic shock 2/2 HFrEF from [facility] for consideration for heart transplant and/or advanced options. Vascular neurology consulted for candidacy for these options.   Recommendations:   -After reviewing the very limited literature available on the topic there seems to be no contraindication for transplant or advanced options  in this patient from vascular neurology perspective. Does not seem to be significant systemic or organ involvement besides brain and heart.   -continue CoQ and levocarnitine  -monitor neuro exam closely, do not hesitate to call with any changes in exam

## 2017-03-03 NOTE — PROGRESS NOTES
RENÉE was negative for ALLA thrombus and patient was successfully cardioverted to NSR. Patient tolerated procedure well.

## 2017-03-03 NOTE — IP AVS SNAPSHOT
[facility]  [address]  [facility] [address]  Phone: [phone]           Patient Discharge Instructions     Our goal is to set you up for success. This packet includes information on your condition, medications, and your home care. It will help you to care for yourself so you don't get sicker and need to go back to the hospital.     Please ask your nurse if you have any questions.        There are many details to remember when preparing to leave the hospital. Here is what you will need to do:    1. Take your medicine. If you are prescribed medications, review your Medication List in the following pages. You may have new medications to  at the pharmacy and others that you'll need to stop taking. Review the instructions for how and when to take your medications. Talk with your doctor or nurses if you are unsure of what to do.     2. Go to your follow-up appointments. Specific follow-up information is listed in the following pages. Your may be contacted by a transition nurse or clinical provider about future appointments. Be sure we have all of the phone numbers to reach you, if needed. Please contact your provider's office if you are unable to make an appointment.     3. Watch for warning signs. Your doctor or nurse will give you detailed warning signs to watch for and when to call for assistance. These instructions may also include educational information about your condition. If you experience any of warning signs to your health, call your doctor.               [facility]  Unless otherwise directed by your provider, please contact [facility], our nurse care line that is available for 24/7 assistance.     [phone] (toll-free)    Registered nurses in the [facility] provide clinical advisement, health education, appointment booking, and other advisory services.                    ** Verify the list of medication(s) below is accurate and up  to date. Carry this with you in case of emergency. If your medications have changed, please notify your healthcare provider.             Medication List      START taking these medications        Additional Info    Begin Date AM Noon PM Bedtime    bumetanide 2 MG tablet   Commonly known as:  BUMEX   Quantity:  90 tablet   Refills:  3   Dose:  2 mg    Last time this was given:  2 mg on 3/21/2017  9:09 AM   Instructions:  Take 1 tablet (2 mg total) by mouth once daily.                               coenzyme Q10 100 mg capsule   Refills:  0   Dose:  400 mg    Last time this was given:  400 mg on 3/24/2017  9:39 AM   Instructions:  Take 4 capsules (400 mg total) by mouth once daily.                               digoxin 125 mcg tablet   Commonly known as:  LANOXIN   Quantity:  36 tablet   Refills:  3   Dose:  0.125 mg    Last time this was given:  0.125 mg on 3/24/2017  4:52 PM   Instructions:  Take 1 tablet (0.125 mg total) by mouth every Mon, Wed, Fri.            Monday morning                   DOBUTamine 1,000 mg/250 mL (4,000 mcg/mL) infusion   Commonly known as:  DOBUTREX   Refills:  0   Dose:  2.5 mcg/kg/min    Last time this was given:  2.5 mcg/kg/min on 3/20/2017  5:17 PM   Instructions:  Inject 162.25 mcg/min into the vein continuous.                            gabapentin 300 MG capsule   Commonly known as:  NEURONTIN   Quantity:  90 capsule   Refills:  3   Dose:  300 mg    Last time this was given:  300 mg on 3/23/2017 10:06 PM   Instructions:  Take 1 capsule (300 mg total) by mouth every evening.                               hydrALAZINE 50 MG tablet   Commonly known as:  APRESOLINE   Quantity:  360 tablet   Refills:  3   Dose:  50 mg   Indications:  Chronic Heart Failure    Last time this was given:  50 mg on 3/24/2017  4:52 PM   Instructions:  Take 1 tablet (50 mg total) by mouth 4 (four) times daily with meals and nightly.                               isosorbide dinitrate 20 MG tablet   Commonly known  as:  ISORDIL   Quantity:  360 tablet   Refills:  3   Dose:  20 mg   Indications:  Chronic Heart Failure    Last time this was given:  20 mg on 3/24/2017  4:52 PM   Instructions:  Take 1 tablet (20 mg total) by mouth 4 (four) times daily with meals and nightly.                               levocarnitine 330 mg Tab   Commonly known as:  CARNITOR   Quantity:  810 tablet   Refills:  3   Dose:  990 mg    Instructions:  Take 3 tablets (990 mg total) by mouth 3 (three) times daily.                               mirtazapine 7.5 MG Tab   Commonly known as:  REMERON   Quantity:  90 tablet   Refills:  3   Dose:  7.5 mg    Last time this was given:  7.5 mg on 3/23/2017 10:06 PM   Instructions:  Take 1 tablet (7.5 mg total) by mouth every evening.                               senna-docusate 8.6-50 mg 8.6-50 mg per tablet   Commonly known as:  PERICOLACE   Refills:  0   Dose:  2 tablet    Last time this was given:  2 tablets on 3/24/2017  9:39 AM   Instructions:  Take 2 tablets by mouth 2 (two) times daily.                               spironolactone 25 MG tablet   Commonly known as:  ALDACTONE   Quantity:  45 tablet   Refills:  3   Dose:  12.5 mg    Last time this was given:  25 mg on 3/18/2017  8:18 AM   Instructions:  Take 0.5 tablets (12.5 mg total) by mouth once daily.                               warfarin 7.5 MG tablet   Commonly known as:  COUMADIN   Quantity:  30 tablet   Refills:  11   Dose:  7.5 mg    Last time this was given:  7.5 mg on 3/24/2017  4:52 PM   Instructions:  Take 1 tablet (7.5 mg total) by mouth Daily.                    Saturday afternoon             CONTINUE taking these medications        Additional Info    Begin Date AM Noon PM Bedtime    levetiracetam 750 MG Tab   Commonly known as:  KEPPRA   Refills:  0   Dose:  500 mg    Last time this was given:  750 mg on 3/24/2017  9:40 AM   Instructions:  Take 500 mg by mouth 2 (two) times daily.                                 STOP taking these medications      furosemide 40 MG tablet   Commonly known as:  LASIX            Where to Get Your Medications      These medications were sent to [facility] [address] [place] [address] AT [facility] [address] [address]  [id number] [place] [id number]    Hours:  24-hours Phone:  [phone]     bumetanide 2 MG tablet    digoxin 125 mcg tablet    gabapentin 300 MG capsule    hydrALAZINE 50 MG tablet    isosorbide dinitrate 20 MG tablet    levocarnitine 330 mg Tab    mirtazapine 7.5 MG Tab    spironolactone 25 MG tablet    warfarin 7.5 MG tablet         You can get these medications from any pharmacy     You don't need a prescription for these medications     coenzyme Q10 100 mg capsule    senna-docusate 8.6-50 mg 8.6-50 mg per tablet         Information about where to get these medications is not yet available     ! Ask your nurse or doctor about these medications     DOBUTamine 1,000 mg/250 mL (4,000 mcg/mL) infusion                  Please bring to all follow up appointments:    1. A copy of your discharge instructions.  2. All medicines you are currently taking in their original bottles.  3. Identification and insurance card.    Please arrive 15 minutes ahead of scheduled appointment time.    Please call 24 hours in advance if you must reschedule your appointment and/or time.        Your Scheduled Appointments     Mar 31, 2017  7:30 AM CDT   EKG Rhythm Strip with EKG, APPT   [facility] - EKG ([facility] )    [address]  [place] [address] [phone]            Mar 31, 2017  8:00 AM CDT   Established Patient Visit with MD Matheus Hampton - Arrhythmia ([facility] )    [address]  [place] [address] [phone]              Follow-up Information     Follow up with Maico Encarnacion MD. Go on 3/31/2017.    Specialty:  Cardiology    Contact information:    [address]  [facility] [address] [phone]          Discharge  "Instructions     Future Orders    CBC auto differential     Process Instructions:    Please collect a Lavender, EDTA tube or EDTA Microtainer.  If the patient is a known platelet clumper, please collect an additional citrate (blue top) tube.    Comprehensive metabolic panel     Protime-INR     COMMODE FOR HOME USE     Questions:    Type:  Standard    Height:  6' (1.829 m)    Weight:  48 kg (105 lb 13.1 oz)    Does patient have medical equipment at home?:  none    Length of need (1-99 months):  99    Vendor:  Other (use comments) Comment - [employer]    Expected Date of Delivery:  3/23/2017    Expected Time of Delivery:      TRANSFER TUB BENCH FOR HOME USE     Questions:    Type of Transfer Tub Bench:  Padded    Height:  6' (1.829 m)    Weight:  48 kg (105 lb 13.1 oz)    Does patient have medical equipment at home?:  none    Length of need (1-99 months):  99    Vendor:  Other (use comments) Comment - [employer]    Expected Date of Delivery:  3/23/2017    Expected Time of Delivery:      WALKER FOR HOME USE     Questions:    Type of Walker:  Adult (5'4"-6'6")    With wheels?:  Yes    Height:  6' (1.829 m)    Weight:  48 kg (105 lb 13.1 oz)    Length of need (1-99 months):  99    Platform attachment:      Accessories/Other:      Assistance needed:      Does patient have medical equipment at home?:  none    Please check all that apply:  Patient is unable to safely ambulate without equipment.    Vendor:  Other (use comments) Comment - [employer]     Expected Date of Delivery:  3/23/2017    Expected Time of Delivery:          Primary Diagnosis     Your primary diagnosis was:  Heart Failure That Is Caused By Inadequate Blood Supply      Admission Information     Date & Time Provider Department CSN    3/3/2017  4:21 AM Landon Galeano MD [facility] [id number]      Care Providers     Provider Role Specialty Primary office phone    Landon Galeano MD Attending Provider Cardiology [phone] "    Landon Galeano MD Consulting Physician  Cardiology [phone]    Juni Hunt MD Team Attending  Cardiology [phone]      Your Vitals Were     BP Pulse Temp Resp Height Weight    105/71 (BP Location: Left arm, Patient Position: Lying, BP Method: Automatic) 94 97.9 °F (36.6 °C) (Oral) 16 6' (1.829 m) 48.1 kg (106 lb 0.7 oz)    SpO2 BMI             97% 14.38 kg/m2         Recent Lab Values     No lab values to display.      Pending Labs     Order Current Status    Anti-Xa Heparin Monitoring In process    Anti-Xa Heparin Monitoring In process    Osmolality, urine In process      Allergies as of 3/24/2017        Reactions    Aspirin     Bactrim [Sulfamethoxazole-trimethoprim]     Depakote [Divalproex]     Dilantin [Phenytoin Sodium Extended]     Lorazepam     Phenobarbital     Tegretol [Carbamazepine]     Thallium-201       Advance Directives     An advance directive is a document which, in the event you are no longer able to make decisions for yourself, tells your healthcare team what kind of treatment you do or do not want to receive, or who you would like to make those decisions for you.  If you do not currently have an advance directive, [facility] encourages you to create one.  For more information call:  [phone]), [phone] ([phone]),  or log on to [email or web address].        Language Assistance Services     ATTENTION: Language assistance services are available, free of charge. Please call [phone].      ATENCIÓN: Si habla español, tiene a perera disposición servicios gratuitos de asistencia lingüística. Llame al [phone].     CHÚ Ý: N?u b?n nói Ti?ng Vi?t, có các d?ch v? h? tr? ngôn ng? mi?n phí dành cho b?n. G?i s? [phone].        Heart Failure Education       Heart Failure: Being Active  You have a condition called heart failure. Being active doesnt mean that you have to wear yourself out. Even a little movement each day helps to strengthen your heart.  If you cant get out to exercise, you can do simple stretching and strengthening exercises at home. These are good ways to keep you well-conditioned and prevent you and your heart from becoming excessively weak.    Ideas to get you started  · Add a little movement to things you do now. Walk to mail letters. Park your car at the far end of the parking lot and walk to the store. Walk up a flight of stairs instead of taking the elevator.  · Choose activities you enjoy. You might walk, swim, or ride an exercise bike. Things like gardening and washing the car count, too. Other possibilities include: washing dishes, walking the dog, walking around the mall, and doing aerobic activities with friends.  · Join a group exercise program at a [facility] or [facility], a senior center, or a community center. Or look into a hospital cardiac rehabilitation program. Ask your doctor if you qualify.  Tips to keep you going  · Get up and get dressed each day. Go to a coffee shop and read a newspaper or go somewhere that you'll be in the presence of other active people. Youll feel more like being active.  · Make a plan. Choose one or more activities that you enjoy and that you can easily do. Then plan to do at least one each day. You might write your plan on a calendar.  · Go with a friend or a group if you like company. This can help you feel supported and stay motivated, too.  · Plan social events that you enjoy. This will keep you mentally engaged as well as physically motivated to do things you find pleasure in.  For your safety  · Talk with your healthcare provider before starting an exercise program.  · Exercise indoors when its too hot or too cold outside, or when the air quality is poor. Try walking at a shopping mall.  · Wear socks and sturdy shoes to maintain your balance and prevent falls.  · Start slowly. Do a few minutes several times a day at first. Increase your time and speed little by little.  · Stop and rest whenever you feel  tired or get short of breath.  · Dont push yourself on days when you dont feel well.  Date Last Reviewed: 3/20/2016  © 4832-9527 [employer]. [address] [place] [address], [place] [address]. All rights reserved. This information is not intended as a substitute for professional medical care. Always follow your healthcare professional's instructions.              Heart Failure: Evaluating Your Heart  You have a condition called heart failure. To evaluate your condition, your doctor will examine you, ask questions, and do some tests. Along with looking for signs of heart failure, the doctor looks for any other health problems that may have led to heart failure. The results of your evaluation will help your doctor form a treatment plan.  Health history and physical exam  Your visit will start with a health history. Tell the doctor about any symptoms youve noticed and about all medicines you take. Then youll have a physical exam. This includes listening to your heartbeat and breathing. Youll also be checked for swelling (edema) in your legs and neck. When you have fluid buildup or fluid in the lungs, it may be called congestive heart failure.  Diagnosing heart failure     During an echocardiogram, sound waves bounce off the heart. These are converted into a picture on the screen.   The following may be done to help your doctor form a diagnosis:  · X-rays show the size and shape of your heart. These pictures can also show fluid in your lungs.  · An electrocardiogram (ECG or EKG) shows the pattern of your heartbeat. Small pads (electrodes) are placed on your chest, arms, and legs. Wires connect the pads to the ECG machine, which records your hearts electrical signals. This can give the doctor information about heart function.  · An echocardiogram uses ultrasound waves to show the structure and movement of your heart muscle. This shows how well the heart pumps. It also shows the thickness of the heart walls,  and if the heart is enlarged. It is one of the most useful, non-invasive tests as it provides information about the heart's general function. This helps your doctor make treatment decisions.  · Lab tests evaluate small amounts of blood or urine for signs of problems. A BNP lab test can help diagnose and evaluate heart failure. BNP stands for B-type natriuretic peptide. The ventricles secrete more BNP when heart failure worsens. Lab tests can also provide information about metabolic dysfunction or heart dysfunction.  Your treatment plan  Based on the results of your evaluation and tests, your doctor will develop a treatment plan. This plan is designed to relieve some of your heart failure symptoms and help make you more comfortable. Your treatment plan may include:  · Medicine to help your heart work better and improve your quality of life  · Changes in what you eat and drink to help prevent fluid from backing up in your body  · Daily monitoring of your weight and heart failure symptoms to see how well your treatment plan is working  · Exercise to help you stay healthy  · Help with quitting smoking  · Emotional and psychological support to help adjust to the changes  · Referrals to other specialists to make sure you are being treated comprehensively  Date Last Reviewed: 3/21/2016  © 7852-4398 [employer]. [address] [place] [address]. All rights reserved. This information is not intended as a substitute for professional medical care. Always follow your healthcare professional's instructions.              Heart Failure: Making Changes to Your Diet  You have a condition called heart failure. When you have heart failure, excess fluid is more likely to build up in your body because your heart isn't working well. This makes the heart work harder to pump blood. Fluid buildup causes symptoms such as shortness of breath and swelling (edema). This is often referred to as congestive heart failure or  CHF. Controlling the amount of salt (sodium) you eat may help stop fluid from building up. Your doctor may also tell you to reduce the amount of fluid you drink.  Reading food labels    Your healthcare provider will tell you how much sodium you can eat each day. Read food labels to keep track. Keep in mind that certain foods are high in salt. These include canned, frozen, and processed foods. Check the amount of sodium in each serving. Watch out for high-sodium ingredients. These include MSG (monosodium glutamate), baking soda, and sodium phosphate.   Eating less salt  Give yourself time to get used to eating less salt. It may take a little while. Here are some tips to help:  · Take the saltshaker off the table. Replace it with salt-free herb mixes and spices.  · Eat fresh or plain frozen vegetables. These have much less salt than canned vegetables.  · Choose low-sodium snacks like sodium-free pretzels, crackers, or air-popped popcorn.  · Dont add salt to your food when youre cooking. Instead, season your foods with pepper, lemon, garlic, or onion.  · When you eat out, ask that your food be cooked without added salt.  · Avoid eating fried foods as these often have a great deal of salt.  If youre told to limit fluids  You may need to limit how much fluid you have to help prevent swelling. This includes anything that is liquid at room temperature, such as ice cream and soup. If your doctor tells you to limit fluid, try these tips:  · Measure drinks in a measuring cup before you drink them. This will help you meet daily goals.  · Chill drinks to make them more refreshing.  · Suck on frozen lemon wedges to quench thirst.  · Only drink when youre thirsty.  · Chew sugarless gum or suck on hard candy to keep your mouth moist.  · Weigh yourself daily to know if your body's fluid content is rising.  My sodium goal  Your healthcare provider may give you a sodium goal to meet each day. This includes sodium found in food as  well as salt that you add. My goal is to eat no more than ___________ mg of sodium per day.     When to call your doctor  Call your doctor right away if you have any symptoms of worsening heart failure. These can include:  · Sudden weight gain  · Increased swelling of your legs or ankles  · Trouble breathing when youre resting or at night  · Increase in the number of pillows you have to sleep on  · Chest pain, pressure, discomfort, or pain in the jaw, neck, or back   Date Last Reviewed: 3/21/2016  © 9516-4089 [employer]. [address] [place] [address]. All rights reserved. This information is not intended as a substitute for professional medical care. Always follow your healthcare professional's instructions.              Heart Failure: Medicines to Help Your Heart    You have a condition called heart failure (also known as congestive heart failure, or CHF). Your doctor will likely prescribe medicines for heart failure and any underlying health problems you have. Most heart failure patients take one or more types of medicinen. Your healthcare provider will work to find the combination of medicines that works best for you.  Heart failure medicines  Here are the most common heart failure medicines:  · ACE inhibitors lower blood pressure and decrease strain on the heart. This makes it easier for the heart to pump. Angiotensin receptor blockers have similar effects. These are prescribed for some patients instead of ACE inhibitors.  · Beta-blockers relieve stress on the heart. They also improve symptoms. They may also improve the heart's pumping action over time.  · Diuretics (also called water pills) help rid your body of excess water. This can help rid your body of swelling (edema). Having less fluid to pump means your heart doesnt have to work as hard. Some diuretics make your body lose a mineral called potassium. Your doctor will tell you if you need to take supplements or eat more foods  high in potassium.  · Digoxin helps your heart pump with more strength. This helps your heart pump more blood with each beat. So, more oxygen-rich blood travels to the rest of the body.  · Aldosterone antagonists help alter hormones and decrease strain on the heart.  · Hydralazine and nitrates are two separate medicines used together to treat heart failure. They may come in one combination pill. They lower blood pressure and decrease how hard the heart has to pump.  Medicines for related conditions  Controlling other heart problems helps keep heart failure under control, too. Depending on other heart problems you have, medicines may be prescribed to:  · Lower blood pressure (antihypertensives).  · Lower cholesterol levels (statins).  · Prevent blood clots (anticoagulants or aspirin).  · Keep the heartbeat steady (antiarrhythmics).  Date Last Reviewed: 3/5/2016  © 7339-6907 [employer]. [address] [place] [address]. All rights reserved. This information is not intended as a substitute for professional medical care. Always follow your healthcare professional's instructions.              Heart Failure: Procedures That May Help    The heart is a muscle that pumps oxygen-rich blood to all parts of the body. When you have heart failure, the heart is not able to pump as well as it should. Blood and fluid may back up into the lungs (congestive heart failure), and some parts of the body dont get enough oxygen-rich blood to work normally. These problems lead to the symptoms of heart failure.     Certain procedures may help the heart pump better in some cases of heart failure. Some procedures are done to treat health problems that may have caused the heart failure such as coronary artery disease or heart rhythm problems. For more serious heart failure, other options are available.  Treating artery and valve problems  If you have coronary artery disease or valve disease, procedures may be done to  improve blood flow. This helps the heart pump better, which can improve heart failure symptoms. First, your doctor may do a cardiac catheterization to help detect clogged blood vessels or valve damage. During this procedure, a  thin tube (catheter) in inserted into a blood vessel and guided to the heart. There a dye is injected and a special type of X-ray (angiogram) is taken of the blood vessels. Procedures to open a blocked artery or fix damaged valves can also be done using catheterization.  · Angioplasty uses a balloon-tipped instrument at the end of the catheter. The balloon is inflated to widen the narrowed artery. In many cases, a stent is expanded to further support the narrowed artery. A stent is a metal mesh tube.  · Valve surgery repairs or replacement of faulty valves can also be done during catheterization so blood can flow properly through the chambers of the heart.  Bypass surgery is another option to help treat blocked arteries. It uses a healthy blood vessel from elsewhere in the body. The healthy blood vessel is attached above and below the blocked area so that blood can flow around the blocked artery.  Treating heart rhythm problems  A device may be placed in the chest to help a weak heart maintain a healthy, heartbeat so the heart can pump more effectively:  · Pacemaker. A pacemaker is an implanted device that regulates your heartbeat electronically. It monitors your heart's rhythm and generates a painless electric impulse that helps the heart beat in a regular rhythm. A pacemaker is programmed to meet your specific heart rhythm needs.  · Biventricular pacing/cardiac resynchronization therapy. A type of pacemaker that paces both pumping chambers of the heart at the same time to coordinate contractions and to improve the heart's function. Some people with heart failure are candidates for this therapy.  · Implantable cardioverter defibrillator. A device similar to a pacemaker that senses when the  heart is beating too fast and delivers an electrical shock to convert the fast rhythm to a normal rhythm. This can be a life saving device.  In severe cases  In more serious cases of heart failure when other treatments no longer work, other options may include:  · Ventricular assist devices (VADs). These are mechanical devices used to take over the pumping function for one or both of the heart's ventricles, or pumping chambers. A VAD may be necessary when heart failure progresses to the point that medicines and other treatments no longer help. In some cases, a VAD may be used as a bridge to transplant.  · Heart transplant. This is replacing the diseased heart with a healthy one from a donor. This is an option for a few people who are very sick. A heart transplant is very serious and not an option for all patients. Your doctor can tell you more.  Date Last Reviewed: 3/20/2016  © 2911-8589 [employer]. [address] [place] [address]. All rights reserved. This information is not intended as a substitute for professional medical care. Always follow your healthcare professional's instructions.              Heart Failure: Tracking Your Weight  You have a condition called heart failure. When you have heart failure, a sudden weight gain or a steady rise in weight is a warning sign that your body is retaining too much water and salt. This could mean your heart failure is getting worse. If left untreated, it can cause problems for your lungs and result in shortness of breath. Weighing yourself each day is the best way to know if youre retaining water. If your weight goes up quickly, call your doctor. You will be given instructions on how to get rid of the excess water. You will likely need medicines and to avoid salt. This will help your heart work better.  Call your doctor if you gain more than 2 pounds in 1 day, more than 5 pounds in 1 week, or whatever weight gain you were told to report by your  doctor. This is often a sign of worsening heart failure and needs to be evaluated and treated. Your doctor will tell you what to do next.   Tips for weighing yourself    · Weigh yourself at the same time each morning, wearing the same clothes. Weigh yourself after urinating and before eating.  · Use the same scale each day. Make sure the numbers are easy to read. Put the scale on a flat, hard surface -- not on a rug or carpet.  · Do not stop weighing yourself. If you forget one day, weigh again the next morning.  How to use your weight chart  · Keep your weight chart near the scale. Write your weight on the chart as soon as you get off the scale.  · Fill in the month and the start date on the chart. Then write down your weight each day. Your chart will look like this:    · If you miss a day, leave the space blank. Weigh yourself the next day and write your weight in the next space.  · Take your weight chart with you when you go to see your doctor.  Date Last Reviewed: 3/20/2016  © 3307-7437 [employer]. [address] [place] [address]. All rights reserved. This information is not intended as a substitute for professional medical care. Always follow your healthcare professional's instructions.              Heart Failure: Warning Signs of a Flare-Up  You have a condition called heart failure. Once you have heart failure, flare-ups can happen. Below are signs that can mean your heart failure is getting worse. If you notice any of these warning signs, call your healthcare provider.  Swelling    · Your feet, ankles, or lower legs get puffier.  · You notice skin changes on your lower legs.  · Your shoes feel too tight.  · Your clothes are tighter in the waist.  · You have trouble getting rings on or off your fingers.  Shortness of breath  · You have to breathe harder even when youre doing your normal activities or when youre resting.  · You are short of breath walking up stairs or even short  distances.  · You wake up at night short of breath or coughing.  · You need to use more pillows or sit up to sleep.  · You wake up tired or restless.  Other warning signs  · You feel weaker, dizzy, or more tired.  · You have chest pain or changes in your heartbeat.  · You have a cough that wont go away.  · You cant remember things or dont feel like eating.  Tracking your weight  Gaining weight is often the first warning sign that heart failure is getting worse. Gaining even a few pounds can be a sign that your body is retaining excess water and salt. Weighing yourself each day in the morning after you urinate and before you eat, is the best way to know if you're retaining water. Get a scale that is easy to read and make sure you wear the same clothes and use the same scale every time you weigh. Your healthcare provider will show you how to track your weight. Call your doctor if you gain more than 2 pounds in 1 day, 5 pounds in 1 week, or whatever weight gain you were told to report by your doctor. This is often a sign of worsening heart failure and needs to be evaluated and treated before it compromises your breathing. Your doctor will tell you what to do next.    Date Last Reviewed: 3/15/2016  © 2912-8731 [employer]. [address] [place] [address], [place] [address]. All rights reserved. This information is not intended as a substitute for professional medical care. Always follow your healthcare professional's instructions.              Coumadin Discharge Instructions                         Chronic Kindey Disease Education             [facility] Sign-Up     Activating your [facility] account is as easy as 1-2-3!     1) Visit [email or web address], select Sign Up Now, enter this activation code and your date of birth, then select Next.  [email or web address]  Expires: 5/8/2017  7:53 PM      2) Create a username and password to use when you visit [facility] in the future and select a security question in case you lose  your password and select Next.    3) Enter your e-mail address and click Sign Up!    Additional Information  If you have questions, please e-mail [email or web address] or call [phone] to talk to our [facility] staff. Remember, [facility] is NOT to be used for urgent needs. For medical emergencies, dial 911.          [facility] complies with applicable Federal civil rights laws and does not discriminate on the basis of race, color, national origin, age, disability, or sex.

## 2017-03-03 NOTE — ANESTHESIA POSTPROCEDURE EVALUATION
Anesthesia Post Evaluation    Patient: Binh Conde    Procedure(s) Performed: Procedure(s) (LRB):  CARDIOVERSION (N/A)    Final Anesthesia Type: MAC  Patient location during evaluation: ICU  Patient participation: No - Unable to Participate, Sedation  Level of consciousness: sedated  Post-procedure vital signs: reviewed and stable  Pain management: adequate  Airway patency: patent  PONV status at discharge: No PONV  Anesthetic complications: no      Cardiovascular status: stable  Respiratory status: unassisted  Hydration status: euvolemic  Follow-up not needed.        Visit Vitals    BP (!) 97/53    Pulse 91    Temp 36.2 °C (97.1 °F) (Core)    Resp 18    Ht 6' (1.829 m)    Wt 63.4 kg (139 lb 12.4 oz)    SpO2 (!) 94%    BMI 18.96 kg/m2       Pain/Daniela Score: Pain Assessment Performed: Yes (3/3/2017  4:30 AM)  Presence of Pain: denies (3/3/2017  4:30 AM)

## 2017-03-03 NOTE — PROGRESS NOTES
EP Brief Progress Note  ICD interrogation was reviewed with Dr Reyez looking closely looks like he has HR around 120 for about 2 weeks and that is possibly AFlutter.EP will follow     Lazaro Harden MD  Cardiology Fellow  Pager: [phone]

## 2017-03-03 NOTE — PROGRESS NOTES
RENÉE Pre- Procedure Note  Attending Physician: Romelia Carpenter MD  Procedure: CARDIOVERSION RENÉE (N/A)    HPI:   36 y.o. gentleman with history of HFrEF (EF <10%) 2/2 NICM s/p AICD (implanted 12/2013), MELAS (mitochondrial encephalopathy, lactic acidosis, and stroke-like episodes), hx of LV thrombus, Atrial Flutter, CKD, seizure disorder, who is now transferred to [facility] from [facility] in cardiogenic shock and consideration for heart transplant and/or advanced options. RENÉE/ DCCV requested for atrial flutter.      ROS:    Constitution: negative for - fever, chills, weight loss or weight gain  HENT: negative for - sore throat, rhinorrhea, or headache  Eyes: negative for - blurred or double vision  Cardiovascular: no chest pain or dyspnea on exertion  Pulmonary: no cough, shortness of breath, or wheezing  Gastrointestinal: negative for - abdominal pain, nausea, vomiting, or diarrhea  : negative for - dysuria  Neurological: negative for - focal weakness or sensory changes  PMH:   History reviewed. No pertinent past medical history.  History reviewed. No pertinent surgical history.  Allergies:   Review of patient's allergies indicates:  No Known Allergies  Medications:       Inpatient Medications   Continuous Infusions:   DOBUTamine 5 mcg/kg/min (03/03/17 0900)    furosemide (LASIX) 5 mg/mL infusion (non-titrating) 20 mg/hr (03/03/17 0900)    heparin (porcine) in D5W 17 Units/kg/hr (03/03/17 0900)     Scheduled Meds:   coenzyme Q10  400 mg Oral Daily    levetiracetam  750 mg Oral BID    levocarnitine  330 mg Oral BID    pantoprazole  40 mg Intravenous Daily    sodium chloride 0.9%  3 mL Intravenous Q8H     PRN Meds:heparin (PORCINE), heparin (PORCINE)     Social History:     Social History   Substance Use Topics    Smoking status: Not on file    Smokeless tobacco: Not on file    Alcohol use Not on file     Family History:   History reviewed. No pertinent family history.  Physical Exam:     Vitals:  Temp:  [97.7  °F (36.5 °C)-98.5 °F (36.9 °C)]   Pulse:  [134-140]   Resp:  [13-23]   BP: ()/(53-70)   SpO2:  [96 %-100 %]   Arterial Line BP: ()/(45-51)  on RA I/O's:    Intake/Output Summary (Last 24 hours) at 03/03/17 1045  Last data filed at 03/03/17 1000   Gross per 24 hour   Intake           112.26 ml   Output             3010 ml   Net         -2897.74 ml        Constitutional: NAD, conversant  HEENT: Sclera anicteric, PERRLA, EOMI  Neck: 12-14 cm JVD, no carotid bruits  CV: Tachy, no murmur, normal S1/S2  Pulm: CTAB, no wheezes, rales, or ronchi  GI: Abdomen soft, NTND, +BS  Extremities: No LE edema, warm and well perfused  Skin: No ecchymosis, erythema, or ulcers  Psych: AOx3, appropriate affect  Neuro: CNII-XII intact, no focal deficits    Labs:       Recent Labs  Lab 03/03/17  0630      K 4.5   CL 99   CO2 25   BUN 90*   CREATININE 3.0*   *   ANIONGAP 13       Recent Labs  Lab 03/03/17  0630   BNP 2508*      Recent Labs  Lab 03/03/17  0630   WBC 12.42   HGB 12.9*   HCT 37.7*       Recent Labs  Lab 03/03/17  0630   *   *   ALKPHOS 163*   BILITOT 4.1*   ALBUMIN 2.7*        Imaging:     EF   Date Value Ref Range Status   03/03/2017 10 (A) 55 - 65        1 - Severe left ventricular enlargement.     2 - Severely depressed left ventricular systolic function (EF approximately 10 %).     3 - Eccentric hypertrophy.     4 - Left ventricular diastolic dysfunction.     5 - Biatrial enlargement.     6 - Right ventricular enlargement with moderately depressed systolic function.     7 - Moderate mitral regurgitation.     8 - Moderate to severe tricuspid regurgitation.     9 - Mild pulmonic regurgitation.     10 - Trivial pericardial effusion.     11 - Increased central venous pressure.     12 - The estimated PA systolic pressure is 37 mmHg.     13 - Possible apical thrombus.     Assessment:   36 y.o. gentleman with history of HFrEF (EF <10%) 2/2 NICM s/p AICD (implanted 12/2013), SOILA  (mitochondrial encephalopathy, lactic acidosis, and stroke-like episodes), hx of LV thrombus, Atrial Flutter, CKD, seizure disorder, who is now transferred to [facility] from [facility] in cardiogenic shock and consideration for heart transplant and/or advanced options. RENÉE/ DCCV requested for atrial flutter.      Plan:   - Plan for RENÉE/ DCCV at bedside with anesthesia  -The risks, benefits & alternatives of the procedure were explained to the patient and sister  -The risks of transesophageal echo include but are not limited to:  Dental trauma, esophageal trauma/perforation, bleeding, laryngospasm/brochospasm, aspiration, sore throat/hoarseness, & dislodgement of the endotracheal tube/nasogastric tube (where applicable).    -The risks of moderate sedation include hypotension, respiratory depression, arrhythmias, bronchospasm, & death.    -Informed consent was obtained & the patient and sister are agreeable to proceed with the procedure.    Signed:    Haider Johansen MD  Cardiology Fellow, PGY-6  Pager: [phone]  3/3/2017 10:45 AM

## 2017-03-03 NOTE — PROGRESS NOTES
Admit Note     Met with patient and sister (via phone) to assess patients needs due to pt having multiple doctors at bedside and preparation for a procedure.  Patient is a 36 y.o. single male, admitted for heart failure.     Pt sister on her way to [facility] today.    Patient admitted from an outside hospital on 3/3/2017 .  At this time, patient presents as alert/oriented yet critically ill and prepping for a procedure at bedside.  At this time, patients caregiver presents as not present, but on the phone with social work.      Household/Family Systems (as reported by patients caregiver)     Patient resides alone, at:    [address]  [place], [address]    The below address is a family members home and is what is in the chart.     [address]  [place] [address].      Deisi, pt's sister, ph: [phone]    Support system includes pt's 2 sisters, 1 brother and an aunt, pt's parents are .  Pt has one 12yr old son who lives in [place] with his grandmother     Patients primary caregiver is Deisi, marbin sister, phone number [phone].  Confirmed patients contact information is [phone] (home);   No relevant phone numbers on file.       Pt sister states she handles all of pt's grocery shopping, bills and doctor visits.    During admission, patient's caregiver plans to stay at home.  Confirmed patient and patients caregivers do have access to reliable transportation. Pt does not drive, his sister, Deisi, provides all transportation.    Cognitive Status/Learning     Patients caregiver reports patients reading ability as 12th grade and states patient does have difficulty with writing and learning.  Patients caregiver reports patient learns best by demonstration.   Needed: No.   Highest education level: High School (9-12) or GED    Vocation/Disability (as reported by patients caregiver)    Working for Income: No  If no, reason not working: Disability  Patient is disabled due to  seizures. Pt also has a history of mitochondrial encephalopathy.     Adherence     Patients caregiver reports patient has a high level of adherence to patients health care regimen.  Adherence counseling and education provided.  Patient's caregiver verbalizes understanding.    Substance Use    Patients caregiver reports patients substance usage as the following:    Tobacco: none, patient denies any use.  Alcohol: none, patient denies any use.  Illicit Drugs/Non-prescribed Medications: daily marijuana use.  Patients caregiver states clear understanding of the potential impact of substance use.  Substance abstinence/cessation counseling, education and resources provided and reviewed.     Services Utilizing/ADLS (as reported by patients caregiver)    Infusion Service: Prior to admission, patient utilizing? no  Home Health: Prior to admission, patient utilizing? no  DME: Prior to admission, no  Pulmonary/Cardiac Rehab: Prior to admission, no  Dialysis:  Prior to admission, no  Transplant Specialty Pharmacy:  Prior to admission, no.    Prior to admission, patients caregiver reports patient was independent with ADLS and was not driving.  Patients caregiver reports patient is not able to care for self at this time due to compromised medical condition (as documented in medical record) and physical weakness..  Patients caregiver reports patient indicates a willingness to care for self once medically cleared to do so.    Insurance/Medications    Insured by   Payor/Plan Subscr  Sex Relation Sub. Ins. ID Effective Group Num   1. [place]* LUCIANA KING 1980 Male  [id number]                                    [address]   2. [place]UC[place] KING 1980 Male  [id number]* 3/1/17                                    [address] [address]      Primary Insurance (for UNOS reporting): Public Insurance - Medicare FFS (Fee For Service)  Secondary Insurance (for UNOS reporting): Public Insurance -  Medicaid    Patients caregiver reports patient is able to obtain and afford medications at this time and at time of discharge.    Living Will/Healthcare Power of     Patients caregiver reports patient does not have a LW and/or HCPA.   provided education regarding LW and HCPA and the completion of forms.    Coping/Mental Health (as reported by patients caregiver)    Patient is coping adequately with the aid of  family members.  Patients caregiver is coping adequately and has good family support, but was appropriately tearful on the phone regarding her brother's condition.      Discharge Planning (as reported by patients caregiver)    At time of discharge, patient plans to return to patient's home or sister's home under the care of sister and self.  Patients sister will transport patient.  Per rounds today, expected discharge date has not been medically determined at this time. Patients caretaker verbalizes understanding and is involved in treatment planning and discharge process.    Additional Concerns    Patient's caretaker denies additional needs and/or concerns at this time. SW continuing to follow for education, support, resources and dc planning as appropriate.

## 2017-03-03 NOTE — ANESTHESIA PREPROCEDURE EVALUATION
03/03/2017  Binh Conde is a 36 y.o., male.    [facility] Anesthesia Evaluation    I have reviewed the Patient Summary Reports.    I have reviewed the Nursing Notes.   I have reviewed the Medications.     Review of Systems  Anesthesia Hx:  No problems with previous Anesthesia  History of prior surgery of interest to airway management or planning: Previous anesthesia: General Denies Family Hx of Anesthesia complications.   Denies Personal Hx of Anesthesia complications.   Social:  Non-Smoker    Hematology/Oncology:     Oncology Normal    -- Anemia:   EENT/Dental:EENT/Dental Normal   Cardiovascular:   Exercise tolerance: poor Pacemaker Dysrhythmias CHF ECG has been reviewed. Atrial flutter; EF < 10% due to NICM; IABP    Pulmonary:  Pulmonary Normal    Renal/:  Renal/ Normal     Hepatic/GI:   GERD    Musculoskeletal:  Musculoskeletal Normal    Neurological:   CVA Neuromuscular Disease, Seizures Mitochondrial encephalopathy; stroke-like episodes (MELAS)   Endocrine:  Endocrine Normal    Dermatological:  Skin Normal    Psych:  Psychiatric Normal           Physical Exam  General:  Well nourished    Airway/Jaw/Neck:  Airway Findings: Mouth Opening: < 3 cm Tongue: Normal  TM Distance: 4 - 6 cm     Poor effort   Eyes/Ears/Nose:  EYES/EARS/NOSE FINDINGS: Normal   Dental:  Dental Findings: In tact   Chest/Lungs:  Chest/Lungs Findings: Clear to auscultation, Normal Respiratory Rate     Heart/Vascular:  Heart Findings: Rate: Tachycardia  Rhythm: Irregularly Irregular  Sounds: Normal     Abdomen:  Abdomen Findings: Normal    Musculoskeletal:  Musculoskeletal Findings: Normal   Skin:  Skin Findings: Normal    Mental Status:  Mental Status Findings:  Cooperative, Alert and Oriented         Anesthesia Plan  Type of Anesthesia, risks & benefits discussed:  Anesthesia Type:  general  Patient's Preference:   Intra-op  Monitoring Plan:   Intra-op Monitoring Plan Comments:   Post Op Pain Control Plan:   Post Op Pain Control Plan Comments:   Induction:   IV  Beta Blocker:  Patient is not currently on a Beta-Blocker (No further documentation required).       Informed Consent: Patient representative understands risks and agrees with Anesthesia plan.  Questions answered. Anesthesia consent signed with patient representative.  ASA Score: 4     Day of Surgery Review of History & Physical:            Ready For Surgery From Anesthesia Perspective.

## 2017-03-04 LAB
ALBUMIN SERPL BCP-MCNC: 3.1 G/DL
ALLENS TEST: ABNORMAL
ALP SERPL-CCNC: 176 U/L
ALT SERPL W/O P-5'-P-CCNC: 679 U/L
ANION GAP SERPL CALC-SCNC: 15 MMOL/L
ANISOCYTOSIS BLD QL SMEAR: SLIGHT
AST SERPL-CCNC: 376 U/L
BASOPHILS # BLD AUTO: 0.01 K/UL
BASOPHILS NFR BLD: 0.1 %
BILIRUB SERPL-MCNC: 4.5 MG/DL
BUN SERPL-MCNC: 82 MG/DL
CALCIUM SERPL-MCNC: 8.5 MG/DL
CHLORIDE SERPL-SCNC: 90 MMOL/L
CO2 SERPL-SCNC: 32 MMOL/L
CREAT SERPL-MCNC: 2.9 MG/DL
DIFFERENTIAL METHOD: ABNORMAL
EOSINOPHIL # BLD AUTO: 0 K/UL
EOSINOPHIL NFR BLD: 0.1 %
ERYTHROCYTE [DISTWIDTH] IN BLOOD BY AUTOMATED COUNT: 15 %
EST. GFR  (AFRICAN AMERICAN): 30.8 ML/MIN/1.73 M^2
EST. GFR  (NON AFRICAN AMERICAN): 26.6 ML/MIN/1.73 M^2
FACT X PPP CHRO-ACNC: 0.62 IU/ML
GLUCOSE SERPL-MCNC: 239 MG/DL
HCO3 UR-SCNC: 37.4 MMOL/L (ref 24–28)
HCT VFR BLD AUTO: 39 %
HGB BLD-MCNC: 13.6 G/DL
HYPOCHROMIA BLD QL SMEAR: ABNORMAL
INR PPP: 1.4
LYMPHOCYTES # BLD AUTO: 1.1 K/UL
LYMPHOCYTES NFR BLD: 8.5 %
MAGNESIUM SERPL-MCNC: 2.1 MG/DL
MCH RBC QN AUTO: 30.4 PG
MCHC RBC AUTO-ENTMCNC: 34.9 %
MCV RBC AUTO: 87 FL
MONOCYTES # BLD AUTO: 1.6 K/UL
MONOCYTES NFR BLD: 12.5 %
NEUTROPHILS # BLD AUTO: 9.8 K/UL
NEUTROPHILS NFR BLD: 78.8 %
PCO2 BLDA: 54.2 MMHG (ref 35–45)
PH SMN: 7.45 [PH] (ref 7.35–7.45)
PHOSPHATE SERPL-MCNC: 3.8 MG/DL
PLATELET # BLD AUTO: 81 K/UL
PLATELET BLD QL SMEAR: ABNORMAL
PMV BLD AUTO: ABNORMAL FL
PO2 BLDA: 34 MMHG (ref 40–60)
POC BE: 13 MMOL/L
POC SATURATED O2: 67 % (ref 95–100)
POC TCO2: 39 MMOL/L (ref 24–29)
POLYCHROMASIA BLD QL SMEAR: ABNORMAL
POTASSIUM SERPL-SCNC: 4.3 MMOL/L
PROT SERPL-MCNC: 6.1 G/DL
PROTHROMBIN TIME: 14.3 SEC
RBC # BLD AUTO: 4.47 M/UL
SAMPLE: ABNORMAL
SITE: ABNORMAL
SODIUM SERPL-SCNC: 137 MMOL/L
WBC # BLD AUTO: 12.46 K/UL

## 2017-03-04 PROCEDURE — 25000003 PHARM REV CODE 250: Performed by: INTERNAL MEDICINE

## 2017-03-04 PROCEDURE — C9113 INJ PANTOPRAZOLE SODIUM, VIA: HCPCS | Performed by: INTERNAL MEDICINE

## 2017-03-04 PROCEDURE — 85520 HEPARIN ASSAY: CPT

## 2017-03-04 PROCEDURE — 27000221 HC OXYGEN, UP TO 24 HOURS

## 2017-03-04 PROCEDURE — 63600367 HC NITRIC OXIDE PER HOUR

## 2017-03-04 PROCEDURE — 99232 SBSQ HOSP IP/OBS MODERATE 35: CPT | Mod: ,,, | Performed by: INTERNAL MEDICINE

## 2017-03-04 PROCEDURE — 27000202 HC IABP, ADD'L DAY

## 2017-03-04 PROCEDURE — C1751 CATH, INF, PER/CENT/MIDLINE: HCPCS

## 2017-03-04 PROCEDURE — 99900035 HC TECH TIME PER 15 MIN (STAT)

## 2017-03-04 PROCEDURE — 99233 SBSQ HOSP IP/OBS HIGH 50: CPT | Mod: ,,, | Performed by: INTERNAL MEDICINE

## 2017-03-04 PROCEDURE — 20000000 HC ICU ROOM

## 2017-03-04 PROCEDURE — 82803 BLOOD GASES ANY COMBINATION: CPT

## 2017-03-04 PROCEDURE — 63600175 PHARM REV CODE 636 W HCPCS: Performed by: INTERNAL MEDICINE

## 2017-03-04 PROCEDURE — 84100 ASSAY OF PHOSPHORUS: CPT

## 2017-03-04 PROCEDURE — 99233 SBSQ HOSP IP/OBS HIGH 50: CPT | Mod: GC,,, | Performed by: INTERNAL MEDICINE

## 2017-03-04 PROCEDURE — 83735 ASSAY OF MAGNESIUM: CPT

## 2017-03-04 PROCEDURE — 80053 COMPREHEN METABOLIC PANEL: CPT

## 2017-03-04 PROCEDURE — 27200188 HC TRANSDUCER, ART ADULT/PEDS

## 2017-03-04 PROCEDURE — 25000003 PHARM REV CODE 250: Performed by: PHYSICIAN ASSISTANT

## 2017-03-04 PROCEDURE — 99233 SBSQ HOSP IP/OBS HIGH 50: CPT | Mod: GC,,, | Performed by: PSYCHIATRY & NEUROLOGY

## 2017-03-04 PROCEDURE — 85610 PROTHROMBIN TIME: CPT

## 2017-03-04 PROCEDURE — 85025 COMPLETE CBC W/AUTO DIFF WBC: CPT

## 2017-03-04 RX ORDER — ALPRAZOLAM 0.5 MG/1
0.5 TABLET ORAL DAILY PRN
Status: DISCONTINUED | OUTPATIENT
Start: 2017-03-04 | End: 2017-03-24 | Stop reason: HOSPADM

## 2017-03-04 RX ORDER — FUROSEMIDE 40 MG/1
40 TABLET ORAL 2 TIMES DAILY
Status: ON HOLD | COMMUNITY
End: 2017-03-24 | Stop reason: HOSPADM

## 2017-03-04 RX ORDER — LEVETIRACETAM 750 MG/1
500 TABLET ORAL 2 TIMES DAILY
COMMUNITY

## 2017-03-04 RX ADMIN — LEVETIRACETAM 750 MG: 750 TABLET, FILM COATED ORAL at 08:03

## 2017-03-04 RX ADMIN — PANTOPRAZOLE SODIUM 40 MG: 40 INJECTION, POWDER, FOR SOLUTION INTRAVENOUS at 08:03

## 2017-03-04 RX ADMIN — Medication 400 MG: at 08:03

## 2017-03-04 RX ADMIN — LEVOCARNITINE 3.3 ML: 1 SOLUTION ORAL at 08:03

## 2017-03-04 RX ADMIN — EPINEPHRINE 0.04 MCG/KG/MIN: 1 INJECTION PARENTERAL at 11:03

## 2017-03-04 NOTE — PROGRESS NOTES
Progress Note  HTS    Admit Date: 3/3/2017   LOS: 1 day     SUBJECTIVE:     Patient seen and examined. Denies chest pain or shortness of breath. Pt denies any other complaints. diresing well overnight.    Scheduled Meds:   coenzyme Q10  400 mg Oral Daily    levetiracetam  750 mg Oral BID    levocarnitine  330 mg Oral BID    pantoprazole  40 mg Intravenous Daily    sodium chloride 0.9%  3 mL Intravenous Q8H     Continuous Infusions:   DOBUTamine 5 mcg/kg/min (03/04/17 0500)    epinephrine infusion (NON-TITRATING) 0.04 mcg/kg/min (03/04/17 0500)    furosemide (LASIX) 5 mg/mL infusion (non-titrating) 20 mg/hr (03/04/17 0500)    heparin (porcine) in D5W 17 Units/kg/hr (03/04/17 0500)    nitric oxide gas       PRN Meds:heparin (PORCINE), heparin (PORCINE)    Review of patient's allergies indicates:   Allergen Reactions    Aspirin     Bactrim [sulfamethoxazole-trimethoprim]     Depakote [divalproex]     Dilantin [phenytoin sodium extended]     Lorazepam     Phenobarbital     Tegretol [carbamazepine]     Thallium-201        OBJECTIVE:     Vital Signs (Most Recent)  Temp: 97.9 °F (36.6 °C) (03/04/17 0400)  Pulse: 75 (03/04/17 0530)  Resp: 16 (03/04/17 0530)  BP: (!) 97/53 (03/03/17 0500)  SpO2: 100 % (03/04/17 0530)    Vital Signs Range (Last 24H):  Temp:  [97.1 °F (36.2 °C)-98.9 °F (37.2 °C)]   Pulse:  []   Resp:  [7-62]   SpO2:  [94 %-100 %]   Arterial Line BP: ()/(45-70)     I & O (Last 24H):  Intake/Output Summary (Last 24 hours) at 03/04/17 0617  Last data filed at 03/04/17 0500   Gross per 24 hour   Intake          1416.06 ml   Output             7780 ml   Net         -6363.94 ml       Physical Exam:   General: Patient in no acute distress or discomfort  HEENT: No JVD, moist mucous membranes  Cardiac: S1S2S3 RRR  Chest: CTABL, no wheezing or rales  Abd:Soft NTND  Ext: No Edema No swelling  Neuro: A and O X 3, non focal      LABS  CBC with Diff:     Recent Labs  Lab 03/03/17  0630  03/04/17  0320   WBC 12.42 12.46   HGB 12.9* 13.6*   HCT 37.7* 39.0*   PLT 71* 81*   LYMPH 9.9*  1.2 8.5*  1.1   MONO 12.3  1.5* 12.5  1.6*   EOSINOPHIL 0.2 0.1       COAG:    Recent Labs  Lab 03/03/17  0630 03/04/17  0320   APTT 30.1  --    INR 1.7* 1.4*       CMP:   Recent Labs  Lab 03/03/17  0630 03/03/17  1825 03/04/17  0320   * 208* 239*   CALCIUM 8.1* 8.4* 8.5*   ALBUMIN 2.7* 2.9* 3.1*   PROT 5.4* 5.6* 6.1    140 137   K 4.5 4.4 4.3   CO2 25 29 32*   CL 99 95 90*   BUN 90* 88* 82*   CREATININE 3.0* 3.0* 2.9*   ALKPHOS 163* 173* 176*   * 711* 679*   * 484* 376*   BILITOT 4.1* 4.3* 4.5*   MG 2.0  --  2.1   PHOS 3.8  --  3.8     Estimated Creatinine Clearance: 29.1 mL/min (based on Cr of 2.9).    .  Recent Labs  Lab 03/03/17  0630   BNP 2508*         Present on Admission:   Cardiogenic shock   MELAS (mitochondrial encephalopathy, lactic acidosis and stroke-like episodes)   Atrial flutter   NICM (nonischemic cardiomyopathy)   Seizure disorder   CKD (chronic kidney disease) stage 4, GFR 15-29 ml/min   Thrombocytopenia   Coagulopathy   Transaminitis   Hypoalbuminemia      ASSESSMENT / PLAN:   35 Y/O M gentleman with PMH of HFrEF (EF <10%) 2/2 NICM s/p AICD (implanted 12/2013), MELAS (mitochondrial encephalopathy, lactic acidosis, and stroke-like episodes), Hx of LV thrombus, Atrial Flutter, CKD, seizure disorder, who is now transferred to [facility] from [facility] in cardiogenic shock and consideration for heart transplant and/or advanced options.    -NICM now in Cardiogenic shock:  IABP in place 1:1 with good augmentation  Epi and Dobutamine for inotropes and Nitric oxide  SVO2 67  CVP 10  Diuresing well with lasix ggt 20  Had arterial US done in case upgrade to Impella 5.0 required  Initiated pathway for evaluation of advanced options, will proceed on Monday.    Atrial FLutter   S/p RENÉE/ DCCV currnetly in sinus  C/W Heparin for anticoagualtion    SOILA  Appreciated [facility]  consult, no contraindication to proceed with evaluation for advanced options  C/W home medications Kepra, L carnitine and Co enz Q    Cardiac diet with boost  Full Code    Kash Luong MD  Cardiology Fellow  3/4/2017 6:17 AM

## 2017-03-04 NOTE — PROGRESS NOTES
Electrophysiology Progress Note  Attending Physician: Romelia Carpenter MD  Hospital Day: 2    Subjective:   Interval History: Patient seen and examined, no events overnight, remained in NSR patient is more awake and alert today with improving in his SVo2 to 68 from 48, still on IABP 1:2    Medications:   Continuous Infusions:   DOBUTamine 5 mcg/kg/min (03/04/17 0701)    epinephrine infusion (NON-TITRATING) 0.04 mcg/kg/min (03/04/17 0701)    furosemide (LASIX) 5 mg/mL infusion (non-titrating) 20 mg/hr (03/04/17 0701)    heparin (porcine) in D5W 17 Units/kg/hr (03/04/17 0701)    nitric oxide gas         Scheduled Meds:   coenzyme Q10  400 mg Oral Daily    levetiracetam  750 mg Oral BID    levocarnitine  330 mg Oral BID    pantoprazole  40 mg Intravenous Daily    sodium chloride 0.9%  3 mL Intravenous Q8H     PRN Meds:heparin (PORCINE), heparin (PORCINE)  Objective:     Vitals:  Temp:  [97.1 °F (36.2 °C)-98.9 °F (37.2 °C)]   Pulse:  []   Resp:  [7-62]   SpO2:  [94 %-100 %]   Arterial Line BP: ()/(45-70)  I/O's:    Intake/Output Summary (Last 24 hours) at 03/04/17 0851  Last data filed at 03/04/17 0701   Gross per 24 hour   Intake          1463.39 ml   Output             8185 ml   Net         -6721.61 ml        Constitutional: NAD, conversant  HEENT: Sclera anicteric, PERRLA, EOMI  Neck: Unable to visualize JVD, no carotid bruits  CV: RRR, no murmur, normal S1/S2  Pulm: CTAB, no wheezes, rales, or ronchi  GI: Abdomen soft, NTND, +BS  Extremities: No LE edema, warm and well perfused  Skin: No ecchymosis, erythema, or ulcers  Psych: AOx3, appropriate affect  Neuro: CNII-XII intact, no focal deficits    Labs:       Recent Labs  Lab 03/03/17  0630 03/03/17  1825 03/04/17  0320    140 137   K 4.5 4.4 4.3   CL 99 95 90*   CO2 25 29 32*   BUN 90* 88* 82*   CREATININE 3.0* 3.0* 2.9*   * 208* 239*   ANIONGAP 13 16 15       Recent Labs  Lab 03/03/17  0630 03/03/17  1825 03/04/17  0320   AST  645* 484* 376*   * 711* 679*   ALKPHOS 163* 173* 176*   BILITOT 4.1* 4.3* 4.5*   ALBUMIN 2.7* 2.9* 3.1*        Recent Labs  Lab 03/03/17  0630 03/04/17  0320   WBC 12.42 12.46   HGB 12.9* 13.6*   HCT 37.7* 39.0*   PLT 71* 81*   GRAN 77.2*  9.6* 78.8*  9.8*       Recent Labs  Lab 03/03/17  0630 03/04/17  0320   INR 1.7* 1.4*       Recent Labs  Lab 03/03/17  0630   BNP 2508*      Micro:   Blood Cultures  No results found for: LABBLOO  Urine Cultures  No results found for: LABURIN    Imaging:     EF   Date Value Ref Range Status   03/03/2017 10 (A) 55 - 65    03/03/2017 10 (A) 55 - 65             Assessment&Plan:   36 y.o. gentleman with PMH of HFrEF (EF <10%) 2/2 NICM s/p ICD (implanted 12/2013), MELAS (mitochondrial encephalopathy, lactic acidosis, and stroke-like episodes), Hx of LV thrombus,CKD, seizure disorder, who is now [place] [facility] in cardiogenic shock and consideration for heart transplant and/or advanced options.EP consulted for typical Aflutter with 2:1 block:     -S/P successful RENÉE+DCCV on 3/3/2017  -Continue on anticoagulation   -ICD interrogated and he has [employer] single lead ICD , interrogation was reviewed with Dr Reyez looking closely looks like he has HR around 120 for about 2 weeks and that is possibly AFlutter  -Will need CTI RFA for typical AFlutter when more stable and off IABP  -EP will follow       Lazaro Harden MD  Cardiology Fellow  Pager: [phone]

## 2017-03-04 NOTE — PROGRESS NOTES
[facility]  Vascular Neurology  Comprehensive Stroke Center  Progress Note      Neurologic Chief Complaint: MELAS     Subjective:     Interval History: Patient is seen for follow-up neurological assessment and treatment recommendations: NAEON patient more awake today     HPI, Past Medical, Family, and Social History remains the same as documented in the initial encounter.     Review of Systems   Constitutional: Negative for chills and fever.   Gastrointestinal: Negative for diarrhea and vomiting.   Neurological: Negative for speech difficulty and headaches.     Scheduled Meds:   coenzyme Q10  400 mg Oral Daily    levetiracetam  750 mg Oral BID    levocarnitine  330 mg Oral BID    pantoprazole  40 mg Intravenous Daily    sodium chloride 0.9%  3 mL Intravenous Q8H     Continuous Infusions:   DOBUTamine 5 mcg/kg/min (03/04/17 1600)    epinephrine infusion (NON-TITRATING) 0.04 mcg/kg/min (03/04/17 1600)    furosemide (LASIX) 5 mg/mL infusion (non-titrating) 20 mg/hr (03/04/17 1600)    heparin (porcine) in D5W 17 Units/kg/hr (03/04/17 1600)    nitric oxide gas       PRN Meds:alprazolam, heparin (PORCINE), heparin (PORCINE)    Objective:     Vital Signs (Most Recent):  Temp: 98.4 °F (36.9 °C) (03/04/17 1500)  Pulse: 101 (03/04/17 1700)  Resp: 20 (03/04/17 1700)  BP: (!) 97/53 (03/03/17 0500)  SpO2: (P) 100 % (03/04/17 1700)       Vital Signs Range (Last 24H):  Temp:  [97.9 °F (36.6 °C)-98.5 °F (36.9 °C)]   Pulse:  []   Resp:  [13-62]   SpO2:  [96 %-100 %]   Arterial Line BP: ()/(53-70)        Physical Exam   Constitutional: He appears well-developed and well-nourished.   HENT:   Head: Normocephalic and atraumatic.   Eyes: Pupils are equal, round, and reactive to light.   Neck: Normal range of motion. Neck supple.   Cardiovascular: Normal rate and regular rhythm.    Pulmonary/Chest: Effort normal. No respiratory distress.       Neurological Exam:   LOC: alert and follows  requests  Language: No aphasia  Speech: No dysarthria  Orientation: Person, Place, month incorrect/age incorrect  Visual Fields (CN II): Hemianopsia  right  EOM (CN III, IV, VI): Full/intact  Pupils (CN III, IV, VI): PERRL  Facial Sensation (CN V): Symmetric  Facial Movement (CN VII): symmetric facial expression  Hearing (CN VIII): intact  Motor*: Arm Left:  Normal (5/5), Leg Left:   Normal (5/5), Arm Right:   Paretic:  4/5, Leg Right:   Paretic:  3/5  Sensation: equal   Tone: Arm-Left: normal; Leg-Left: normal; Arm-Right: normal; Leg-Right: normal    Stroke Scales  Laboratory:  CMP:   Recent Labs  Lab 03/04/17  0320   CALCIUM 8.5*   ALBUMIN 3.1*   PROT 6.1      K 4.3   CO2 32*   CL 90*   BUN 82*   CREATININE 2.9*   ALKPHOS 176*   *   *   BILITOT 4.5*     CBC:   Recent Labs  Lab 03/04/17  0320   WBC 12.46   RBC 4.47*   HGB 13.6*   HCT 39.0*   PLT 81*   MCV 87   MCH 30.4   MCHC 34.9     Lipid Panel: No results for input(s): CHOL, LDLCALC, HDL, TRIG in the last 168 hours.  Hgb A1C: No results for input(s): HGBA1C in the last 168 hours.  TSH: No results for input(s): TSH in the last 168 hours.    Diagnostic Results:  RENÉE 03/03/17  Biatrial enlargement  Moderate Tricuspid regurg  EF 10%       Assessment/Plan:     Mr. Conde is a 35 yo male with history of HFr HFrEF (EF <10%) 2/2 NICM s/p AICD (implanted 12/2013), MELAS (mitochondrial encephalopathy, lactic acidosis, and stroke-like episodes), Hx of LV thrombus, Atrial Flutter, CKD, seizure disorder who was transferred to [facility] from [facility] in cardiogenic shock and consideration for heart transplant and/or advanced options. Vascular neurology consulted evaluation for LVAD/ Transplant candidacy.     03/04/17  Alert and following commands. Has some weakness on right side.        * Cardiogenic shock  HTS managing     MELAS (mitochondrial encephalopathy, lactic acidosis and stroke-like episodes)  Patient with history of MELAS transferred with cardiogenic  shock 2/2 HFrEF from [facility] for consideration for heart transplant and/or advanced options. Vascular neurology consulted for candidacy for these options.     Recommendations:   -After reviewing the very limited literature available on the topic there seems to be no contraindication for transplant or advanced options  in this patient from vascular neurology perspective. Does not seem to be significant systemic or organ involvement besides brain and heart.   -continue CoQ and levocarnitine  -monitor neuro exam closely, do not hesitate to call with any changes in exam      Atrial flutter  Risk factor for stroke   On heparin gtt    CKD (chronic kidney disease) stage 4, GFR 15-29 ml/min  Risk factor for stroke    NICM (nonischemic cardiomyopathy)  EF less than 10% on echo  HTS following    Seizure disorder  On keppra      Evangelina Gordon PA-C  Comprehensive Stroke Center  Department of Vascular Neurology   [facility]

## 2017-03-04 NOTE — PLAN OF CARE
Problem: Patient Care Overview  Goal: Plan of Care Review  Outcome: Ongoing (interventions implemented as appropriate)  No acute issues overnight. VS WNL, Afebrile. CO/I 3's/2's. Last CVP 7. PA catherter SQI never better than 3 even after calibration. AM SVO2 67%. Continue with Heparin, Lasix, Dobutamine, Epi gtts and 20 ppm inhaled nitric oxide. UOP >350 per hr.  Lungs clear with rub and systolic murmur noted.  No issues with IABP semi -auto 1:2 ECG trigger. Good pulses X4.  PA catheter at 58 cm.  AM labs improved , no calls for electrolytes. Pt neuro intact, delayed responses at times.  Discussed issues about transplant versus LVAD, pt still needs intense education.

## 2017-03-04 NOTE — SUBJECTIVE & OBJECTIVE
Neurologic Chief Complaint: MELAS     Subjective:     Interval History: Patient is seen for follow-up neurological assessment and treatment recommendations: NAEON patient more awake today     HPI, Past Medical, Family, and Social History remains the same as documented in the initial encounter.     Review of Systems   Constitutional: Negative for chills and fever.   Gastrointestinal: Negative for diarrhea and vomiting.   Neurological: Negative for speech difficulty and headaches.     Scheduled Meds:   coenzyme Q10  400 mg Oral Daily    levetiracetam  750 mg Oral BID    levocarnitine  330 mg Oral BID    pantoprazole  40 mg Intravenous Daily    sodium chloride 0.9%  3 mL Intravenous Q8H     Continuous Infusions:   DOBUTamine 5 mcg/kg/min (03/04/17 1600)    epinephrine infusion (NON-TITRATING) 0.04 mcg/kg/min (03/04/17 1600)    furosemide (LASIX) 5 mg/mL infusion (non-titrating) 20 mg/hr (03/04/17 1600)    heparin (porcine) in D5W 17 Units/kg/hr (03/04/17 1600)    nitric oxide gas       PRN Meds:alprazolam, heparin (PORCINE), heparin (PORCINE)    Objective:     Vital Signs (Most Recent):  Temp: 98.4 °F (36.9 °C) (03/04/17 1500)  Pulse: 101 (03/04/17 1700)  Resp: 20 (03/04/17 1700)  BP: (!) 97/53 (03/03/17 0500)  SpO2: (P) 100 % (03/04/17 1700)       Vital Signs Range (Last 24H):  Temp:  [97.9 °F (36.6 °C)-98.5 °F (36.9 °C)]   Pulse:  []   Resp:  [13-62]   SpO2:  [96 %-100 %]   Arterial Line BP: ()/(53-70)        Physical Exam   Constitutional: He appears well-developed and well-nourished.   HENT:   Head: Normocephalic and atraumatic.   Eyes: Pupils are equal, round, and reactive to light.   Neck: Normal range of motion. Neck supple.   Cardiovascular: Normal rate and regular rhythm.    Pulmonary/Chest: Effort normal. No respiratory distress.       Neurological Exam:   LOC: alert and follows requests  Language: No aphasia  Speech: No dysarthria  Orientation: Person, Place, month incorrect/age  incorrect  Visual Fields (CN II): Hemianopsia  right  EOM (CN III, IV, VI): Full/intact  Pupils (CN III, IV, VI): PERRL  Facial Sensation (CN V): Symmetric  Facial Movement (CN VII): symmetric facial expression  Hearing (CN VIII): intact  Motor*: Arm Left:  Normal (5/5), Leg Left:   Normal (5/5), Arm Right:   Paretic:  4/5, Leg Right:   Paretic:  3/5  Sensation: equal   Tone: Arm-Left: normal; Leg-Left: normal; Arm-Right: normal; Leg-Right: normal    NIH Stroke Scale:    Level of Consciousness: 0 - alert  LOC Questions: 2 - answers none correctly  LOC Commands: 0 - performs both correctly  Best Gaze: 0 - normal  Visual: 1 - partial hemianopia (right inferior )  Facial Palsy: 0 - normal  Motor Left Arm: 0 - no drift  Motor Right Arm: 1 - drift  Motor Left Le - drift  Motor Right Le - can't resist gravity  Limb Ataxia: 0 - absent  Sensory: 0 - normal  Best Language: 0 - no aphasia  Dysarthria: 0 - normal articulation  Extinction and Inattention: 0 - no neglect  NIH Stroke Scale Total: 7      Laboratory:  CMP:   Recent Labs  Lab 17  0320   CALCIUM 8.5*   ALBUMIN 3.1*   PROT 6.1      K 4.3   CO2 32*   CL 90*   BUN 82*   CREATININE 2.9*   ALKPHOS 176*   *   *   BILITOT 4.5*     CBC:   Recent Labs  Lab 17  0320   WBC 12.46   RBC 4.47*   HGB 13.6*   HCT 39.0*   PLT 81*   MCV 87   MCH 30.4   MCHC 34.9     Lipid Panel: No results for input(s): CHOL, LDLCALC, HDL, TRIG in the last 168 hours.  Hgb A1C: No results for input(s): HGBA1C in the last 168 hours.  TSH: No results for input(s): TSH in the last 168 hours.    Diagnostic Results:  RENÉE 17  Biatrial enlargement  Moderate Tricuspid regurg  EF 10%

## 2017-03-04 NOTE — PLAN OF CARE
Problem: Patient Care Overview  Goal: Plan of Care Review  Outcome: Ongoing (interventions implemented as appropriate)  POC reviewed with pt and his family.  All questions and concerns addressed.  No acute events this shift.  Pt encouraged to eat; he only ate breakfast.  He did have 1 boost and 1 beneprotein packet.  See epic for full charting.

## 2017-03-04 NOTE — ASSESSMENT & PLAN NOTE
Patient with history of MELAS transferred with cardiogenic shock 2/2 HFrEF from [facility] for consideration for heart transplant and/or advanced options. Vascular neurology consulted for candidacy for these options.     Recommendations:   -After reviewing the very limited literature available on the topic there seems to be no contraindication for transplant or advanced options  in this patient from vascular neurology perspective. Does not seem to be significant systemic or organ involvement besides brain and heart.   -continue CoQ and levocarnitine  -monitor neuro exam closely, do not hesitate to call with any changes in exam

## 2017-03-05 LAB
ALBUMIN SERPL BCP-MCNC: 3.4 G/DL
ALLENS TEST: ABNORMAL
ALP SERPL-CCNC: 220 U/L
ALT SERPL W/O P-5'-P-CCNC: 552 U/L
ANION GAP SERPL CALC-SCNC: 16 MMOL/L
ANISOCYTOSIS BLD QL SMEAR: SLIGHT
AST SERPL-CCNC: 184 U/L
BASOPHILS # BLD AUTO: 0.01 K/UL
BASOPHILS NFR BLD: 0.1 %
BILIRUB SERPL-MCNC: 3.8 MG/DL
BUN SERPL-MCNC: 65 MG/DL
CALCIUM SERPL-MCNC: 9.4 MG/DL
CHLORIDE SERPL-SCNC: 86 MMOL/L
CO2 SERPL-SCNC: 37 MMOL/L
CREAT SERPL-MCNC: 2.4 MG/DL
DIFFERENTIAL METHOD: ABNORMAL
EOSINOPHIL # BLD AUTO: 0.1 K/UL
EOSINOPHIL NFR BLD: 0.5 %
ERYTHROCYTE [DISTWIDTH] IN BLOOD BY AUTOMATED COUNT: 15.1 %
EST. GFR  (AFRICAN AMERICAN): 38.7 ML/MIN/1.73 M^2
EST. GFR  (NON AFRICAN AMERICAN): 33.4 ML/MIN/1.73 M^2
FACT X PPP CHRO-ACNC: 0.61 IU/ML
GLUCOSE SERPL-MCNC: 274 MG/DL
HCO3 UR-SCNC: 43.1 MMOL/L (ref 24–28)
HCT VFR BLD AUTO: 43.8 %
HGB BLD-MCNC: 14.9 G/DL
INR PPP: 1.2
LYMPHOCYTES # BLD AUTO: 2 K/UL
LYMPHOCYTES NFR BLD: 17.8 %
MAGNESIUM SERPL-MCNC: 2.3 MG/DL
MCH RBC QN AUTO: 30.7 PG
MCHC RBC AUTO-ENTMCNC: 34 %
MCV RBC AUTO: 90 FL
MONOCYTES # BLD AUTO: 0.8 K/UL
MONOCYTES NFR BLD: 7.6 %
NEUTROPHILS # BLD AUTO: 8.2 K/UL
NEUTROPHILS NFR BLD: 73.6 %
PCO2 BLDA: 64.8 MMHG (ref 35–45)
PH SMN: 7.43 [PH] (ref 7.35–7.45)
PHOSPHATE SERPL-MCNC: 3 MG/DL
PLATELET # BLD AUTO: 77 K/UL
PLATELET BLD QL SMEAR: ABNORMAL
PMV BLD AUTO: 12.6 FL
PO2 BLDA: 37 MMHG (ref 40–60)
POC BE: 19 MMOL/L
POC SATURATED O2: 69 % (ref 95–100)
POC TCO2: 45 MMOL/L (ref 24–29)
POLYCHROMASIA BLD QL SMEAR: ABNORMAL
POTASSIUM SERPL-SCNC: 3.9 MMOL/L
PROT SERPL-MCNC: 7.2 G/DL
PROTHROMBIN TIME: 12.5 SEC
RBC # BLD AUTO: 4.85 M/UL
SAMPLE: ABNORMAL
SITE: ABNORMAL
SODIUM SERPL-SCNC: 139 MMOL/L
WBC # BLD AUTO: 11.1 K/UL

## 2017-03-05 PROCEDURE — 93010 ELECTROCARDIOGRAM REPORT: CPT | Mod: ,,, | Performed by: INTERNAL MEDICINE

## 2017-03-05 PROCEDURE — 63600175 PHARM REV CODE 636 W HCPCS: Performed by: INTERNAL MEDICINE

## 2017-03-05 PROCEDURE — 25000003 PHARM REV CODE 250: Performed by: PHYSICIAN ASSISTANT

## 2017-03-05 PROCEDURE — 99232 SBSQ HOSP IP/OBS MODERATE 35: CPT | Mod: ,,, | Performed by: INTERNAL MEDICINE

## 2017-03-05 PROCEDURE — C9113 INJ PANTOPRAZOLE SODIUM, VIA: HCPCS | Performed by: INTERNAL MEDICINE

## 2017-03-05 PROCEDURE — 25000003 PHARM REV CODE 250: Performed by: INTERNAL MEDICINE

## 2017-03-05 PROCEDURE — 99900035 HC TECH TIME PER 15 MIN (STAT)

## 2017-03-05 PROCEDURE — 84100 ASSAY OF PHOSPHORUS: CPT

## 2017-03-05 PROCEDURE — 80053 COMPREHEN METABOLIC PANEL: CPT

## 2017-03-05 PROCEDURE — 85520 HEPARIN ASSAY: CPT

## 2017-03-05 PROCEDURE — 27000221 HC OXYGEN, UP TO 24 HOURS

## 2017-03-05 PROCEDURE — 63600367 HC NITRIC OXIDE PER HOUR

## 2017-03-05 PROCEDURE — 85610 PROTHROMBIN TIME: CPT

## 2017-03-05 PROCEDURE — 99233 SBSQ HOSP IP/OBS HIGH 50: CPT | Mod: ,,, | Performed by: INTERNAL MEDICINE

## 2017-03-05 PROCEDURE — 82803 BLOOD GASES ANY COMBINATION: CPT

## 2017-03-05 PROCEDURE — 27000202 HC IABP, ADD'L DAY

## 2017-03-05 PROCEDURE — 83735 ASSAY OF MAGNESIUM: CPT

## 2017-03-05 PROCEDURE — 85025 COMPLETE CBC W/AUTO DIFF WBC: CPT

## 2017-03-05 PROCEDURE — 94761 N-INVAS EAR/PLS OXIMETRY MLT: CPT

## 2017-03-05 PROCEDURE — 20000000 HC ICU ROOM

## 2017-03-05 RX ADMIN — Medication 400 MG: at 09:03

## 2017-03-05 RX ADMIN — PANTOPRAZOLE SODIUM 40 MG: 40 INJECTION, POWDER, FOR SOLUTION INTRAVENOUS at 09:03

## 2017-03-05 RX ADMIN — LEVOCARNITINE 3.3 ML: 1 SOLUTION ORAL at 08:03

## 2017-03-05 RX ADMIN — LEVOCARNITINE 3.3 ML: 1 SOLUTION ORAL at 09:03

## 2017-03-05 RX ADMIN — LEVETIRACETAM 750 MG: 750 TABLET, FILM COATED ORAL at 08:03

## 2017-03-05 RX ADMIN — LEVETIRACETAM 750 MG: 750 TABLET, FILM COATED ORAL at 09:03

## 2017-03-05 NOTE — PROGRESS NOTES
Electrophysiology Progress Note  Attending Physician: Romelia Carpenter MD  Hospital Day: 3    Subjective:   Interval History: Patient seen and examined, no events overnight, Tele showing sinus tachycardia , EPI down to 0.02 from 0.04 yesterday , DBT 5 and still on IABP 1:2, SVO2 today 69.    Medications:   Continuous Infusions:   DOBUTamine 5 mcg/kg/min (03/05/17 1200)    epinephrine infusion (NON-TITRATING) 0.02 mcg/kg/min (03/05/17 1200)    furosemide (LASIX) 5 mg/mL infusion (non-titrating) 10 mg/hr (03/05/17 1200)    heparin (porcine) in D5W 17 Units/kg/hr (03/05/17 1200)    nitric oxide gas         Scheduled Meds:   coenzyme Q10  400 mg Oral Daily    levetiracetam  750 mg Oral BID    levocarnitine  330 mg Oral BID    pantoprazole  40 mg Intravenous Daily    sodium chloride 0.9%  3 mL Intravenous Q8H     PRN Meds:alprazolam, heparin (PORCINE), heparin (PORCINE)  Objective:     Vitals:  Temp:  [98.1 °F (36.7 °C)-98.7 °F (37.1 °C)]   Pulse:  []   Resp:  [10-41]   SpO2:  [100 %]   Arterial Line BP: ()/(61-81)  I/O's:    Intake/Output Summary (Last 24 hours) at 03/05/17 1248  Last data filed at 03/05/17 1200   Gross per 24 hour   Intake           920.28 ml   Output             5250 ml   Net         -4329.72 ml        Constitutional: NAD, conversant  HEENT: Sclera anicteric, PERRLA, EOMI  Neck: Unable to visualize JVD, no carotid bruits  CV: RRR, no murmur, normal S1/S2  Pulm: CTAB, no wheezes, rales, or ronchi  GI: Abdomen soft, NTND, +BS  Extremities: No LE edema, warm and well perfused  Skin: No ecchymosis, erythema, or ulcers  Psych: AOx3, appropriate affect  Neuro: CNII-XII intact, no focal deficits    Labs:       Recent Labs  Lab 03/03/17  1825 03/04/17  0320 03/05/17  0330    137 139   K 4.4 4.3 3.9   CL 95 90* 86*   CO2 29 32* 37*   BUN 88* 82* 65*   CREATININE 3.0* 2.9* 2.4*   * 239* 274*   ANIONGAP 16 15 16       Recent Labs  Lab 03/03/17  1825 03/04/17  0320  03/05/17  0330   * 376* 184*   * 679* 552*   ALKPHOS 173* 176* 220*   BILITOT 4.3* 4.5* 3.8*   ALBUMIN 2.9* 3.1* 3.4*        Recent Labs  Lab 03/03/17  0630 03/04/17  0320 03/05/17  0330   WBC 12.42 12.46 11.10   HGB 12.9* 13.6* 14.9   HCT 37.7* 39.0* 43.8   PLT 71* 81* 77*   GRAN 77.2*  9.6* 78.8*  9.8* 73.6*  8.2*       Recent Labs  Lab 03/03/17  0630 03/04/17  0320 03/05/17  0330   INR 1.7* 1.4* 1.2       Recent Labs  Lab 03/03/17  0630   BNP 2508*      Micro:   Blood Cultures  No results found for: LABBLOO  Urine Cultures  No results found for: LABURIN    Imaging:     EF   Date Value Ref Range Status   03/03/2017 10 (A) 55 - 65    03/03/2017 10 (A) 55 - 65             Assessment&Plan:   36 y.o. gentleman with PMH of HFrEF (EF <10%) 2/2 NICM s/p ICD (implanted 12/2013), MELAS (mitochondrial encephalopathy, lactic acidosis, and stroke-like episodes), Hx of LV thrombus,CKD, seizure disorder, who is now [place] [facility] in cardiogenic shock and consideration for heart transplant and/or advanced options.EP consulted for typical Aflutter with 2:1 block:     -S/P successful RENÉE+DCCV on 3/3/2017  -Continue on anticoagulation   -Still tachycardic and may need Beta blockers (curently on dobutamine) discussed with primary team   -ICD interrogated and he has [employer] single lead ICD , interrogation was reviewed with Dr Reyez looking closely looks like he has HR around 120 for about 2 weeks and that is possibly AFlutter  -Will need CTI RFA for typical AFlutter when more stable and off IABP  -EP will follow       Lazaro Harden MD  Cardiology Fellow  Pager: [phone]

## 2017-03-05 NOTE — PT/OT/SLP PROGRESS
"Occupational Therapy      Binh Conde  MRN: [id number]    Patient not seen today secondary to  (pt repeatedly saying, "I'm tired" and would not open eyes when OT attempted to initiate Theraband HEP). RN states that pt is not in a good mood. Theraband and Handout left at b/s. Will follow-up 3/6/17.    JOHN Kurtz  3/5/2017  "

## 2017-03-05 NOTE — PT/OT/SLP PROGRESS
Physical Therapy      Binh Conde  MRN: [id number]    Patient not seen today secondary to  (pt with IABP.  OT to follow for ROM.). Will follow-up as appropriate.    Maribell Fong, PT

## 2017-03-05 NOTE — PROGRESS NOTES
1500: Dr. Wallis notified that PA waveform looks like it is wedged.  [place] balloon is deflated and has not been inflated since MD henryd pt this am.  States to continue to monitor.  No new orders.

## 2017-03-05 NOTE — PLAN OF CARE
Problem: Patient Care Overview  Goal: Plan of Care Review  Pt AAOx4. Sinus tach. Afebrile. BP within normal limits. Gtts per MAR. IABP in place with no acute issues over night; dressing changed. Distal pulses 2+. Morning SVo2 69. Swanz numbers per flowsheet. Rahman with good UO. Pt with 2 small BM's. Pt ate 50% of dinner. POC updated with pt and questions/concerns addressed.

## 2017-03-05 NOTE — PROGRESS NOTES
Called by nursing regarding patient's Melissa waveform which looked more similar to PCW waveform rather than PA waveform. CXR ordered which showed catheter tip still in the RPA. Catheter pulled back 0.5 cm with return of PA waveform.     Fidel Wallis MD, MPH  PGY-IV  Cardiovascular Disease Fellow

## 2017-03-05 NOTE — PROGRESS NOTES
Progress Note  HTS    Admit Date: 3/3/2017   LOS: 2 days     SUBJECTIVE:     Patient seen and examined. Denies chest pain or shortness of breath. NAEO. Outstanding UOP    Scheduled Meds:   coenzyme Q10  400 mg Oral Daily    levetiracetam  750 mg Oral BID    levocarnitine  330 mg Oral BID    pantoprazole  40 mg Intravenous Daily    sodium chloride 0.9%  3 mL Intravenous Q8H     Continuous Infusions:   DOBUTamine 5 mcg/kg/min (03/05/17 1000)    epinephrine infusion (NON-TITRATING) 0.02 mcg/kg/min (03/05/17 1000)    furosemide (LASIX) 5 mg/mL infusion (non-titrating) 10 mg/hr (03/05/17 1000)    heparin (porcine) in D5W 17 Units/kg/hr (03/05/17 1000)    nitric oxide gas       PRN Meds:alprazolam, heparin (PORCINE), heparin (PORCINE)    Review of patient's allergies indicates:   Allergen Reactions    Aspirin     Bactrim [sulfamethoxazole-trimethoprim]     Depakote [divalproex]     Dilantin [phenytoin sodium extended]     Lorazepam     Phenobarbital     Tegretol [carbamazepine]     Thallium-201        OBJECTIVE:     Vital Signs (Most Recent)  Temp: 98.7 °F (37.1 °C) (03/05/17 0701)  Pulse: (!) 120 (03/05/17 1000)  Resp: 17 (03/05/17 1000)  BP: (!) 97/53 (03/03/17 0500)  SpO2: 100 % (03/05/17 1000)    Vital Signs Range (Last 24H):  Temp:  [98.1 °F (36.7 °C)-98.7 °F (37.1 °C)]   Pulse:  []   Resp:  [10-41]   SpO2:  [100 %]   Arterial Line BP: ()/(61-81)     I & O (Last 24H):    Intake/Output Summary (Last 24 hours) at 03/05/17 1111  Last data filed at 03/05/17 1000   Gross per 24 hour   Intake          1164.68 ml   Output             5195 ml   Net         -4030.32 ml       Physical Exam:   General: Patient in no acute distress or discomfort  HEENT: No JVD, moist mucous membranes  Cardiac: S1S2S3 RRR  Chest: CTABL, no wheezing or rales  Abd:Soft NTND  Ext: No Edema No swelling  Neuro: A and O X 3, non focal      LABS  CBC with Diff:     Recent Labs  Lab 03/03/17  0630 03/04/17  0320  03/05/17  0330   WBC 12.42 12.46 11.10   HGB 12.9* 13.6* 14.9   HCT 37.7* 39.0* 43.8   PLT 71* 81* 77*   LYMPH 9.9*  1.2 8.5*  1.1 17.8*  2.0   MONO 12.3  1.5* 12.5  1.6* 7.6  0.8   EOSINOPHIL 0.2 0.1 0.5       COAG:    Recent Labs  Lab 03/03/17 0630 03/04/17 0320 03/05/17  0330   APTT 30.1  --   --    INR 1.7* 1.4* 1.2       CMP:     Recent Labs  Lab 03/03/17 0630 03/03/17  1825 03/04/17 0320 03/05/17  0330   * 208* 239* 274*   CALCIUM 8.1* 8.4* 8.5* 9.4   ALBUMIN 2.7* 2.9* 3.1* 3.4*   PROT 5.4* 5.6* 6.1 7.2    140 137 139   K 4.5 4.4 4.3 3.9   CO2 25 29 32* 37*   CL 99 95 90* 86*   BUN 90* 88* 82* 65*   CREATININE 3.0* 3.0* 2.9* 2.4*   ALKPHOS 163* 173* 176* 220*   * 711* 679* 552*   * 484* 376* 184*   BILITOT 4.1* 4.3* 4.5* 3.8*   MG 2.0  --  2.1 2.3   PHOS 3.8  --  3.8 3.0     Estimated Creatinine Clearance: 33.6 mL/min (based on Cr of 2.4).    .    Recent Labs  Lab 03/03/17 0630   BNP 2508*     SvO2 70    Present on Admission:   Cardiogenic shock   MELAS (mitochondrial encephalopathy, lactic acidosis and stroke-like episodes)   Atrial flutter   NICM (nonischemic cardiomyopathy)   Seizure disorder   CKD (chronic kidney disease) stage 4, GFR 15-29 ml/min   Thrombocytopenia   Coagulopathy   Transaminitis   Hypoalbuminemia      ASSESSMENT / PLAN:   35 Y/O M gentleman with PMH of HFrEF (EF <10%) 2/2 NICM s/p AICD (implanted 12/2013), MELAS (mitochondrial encephalopathy, lactic acidosis, and stroke-like episodes), Hx of LV thrombus, Atrial Flutter, CKD, seizure disorder, who is now [place] [facility] in cardiogenic shock and consideration for heart transplant and/or advanced options.    -NICM now in Cardiogenic shock:  IABP in place 1:1 with good augmentation  Wean epi; continue  and [place] for now  SVO2 70  CVP 4  Diuresing well with lasix ggt 10 mg/hr  Had arterial US done in case upgrade to Impella 5.0 required  Initiated pathway for evaluation of advanced  options, will proceed on Monday.    Atrial FLutter   S/p RENÉE/ DCCV currnetly in sinus  C/W Heparin for anticoagualtion    MELAS  Appreciated Vas Neurology consult, no contraindication to proceed with evaluation for advanced options  C/W home medications Kepra, L carnitine and Co enz Q    Cardiac diet with boost  Full Code    Further recommendations per attending addendum    Sergio Pavon MD  PGY-4 [phone])  Cardiology Fellow

## 2017-03-05 NOTE — PLAN OF CARE
Problem: Cardiac Output Decreased (Adult)  Goal: Adequate Cardiac Output/Effective Tissue Perfusion  Patient will demonstrate the desired outcomes by discharge/transition of care.   Outcome: Ongoing (interventions implemented as appropriate)  POC reviewed with pt and his sister.  All questions and concerns addressed.  EPI turned off.  CO drops to 1.9 when following being turned.  Keven repositioned this shift.  Pt only drank 1 boost with a protein packet.  He refused all food.  Pt educated on importance of nutrition.  See epic for full charting.

## 2017-03-06 ENCOUNTER — SOCIAL WORK (OUTPATIENT)
Dept: TRANSPLANT | Facility: HOSPITAL | Age: 37
End: 2017-03-06
Payer: MEDICARE

## 2017-03-06 ENCOUNTER — DOCUMENTATION ONLY (OUTPATIENT)
Dept: TRANSPLANT | Facility: CLINIC | Age: 37
End: 2017-03-06

## 2017-03-06 PROBLEM — N18.4 CKD (CHRONIC KIDNEY DISEASE) STAGE 4, GFR 15-29 ML/MIN: Chronic | Status: ACTIVE | Noted: 2017-03-03

## 2017-03-06 LAB
ALBUMIN SERPL BCP-MCNC: 3.6 G/DL
ALLENS TEST: ABNORMAL
ALP SERPL-CCNC: 220 U/L
ALT SERPL W/O P-5'-P-CCNC: 393 U/L
ANION GAP SERPL CALC-SCNC: 13 MMOL/L
ANION GAP SERPL CALC-SCNC: 14 MMOL/L
ANISOCYTOSIS BLD QL SMEAR: SLIGHT
AST SERPL-CCNC: 104 U/L
BASOPHILS # BLD AUTO: 0.01 K/UL
BASOPHILS NFR BLD: 0.1 %
BILIRUB SERPL-MCNC: 3.9 MG/DL
BUN SERPL-MCNC: 65 MG/DL
BUN SERPL-MCNC: 66 MG/DL
CALCIUM SERPL-MCNC: 10.1 MG/DL
CALCIUM SERPL-MCNC: 9.9 MG/DL
CHLORIDE SERPL-SCNC: 85 MMOL/L
CHLORIDE SERPL-SCNC: 85 MMOL/L
CO2 SERPL-SCNC: 35 MMOL/L
CO2 SERPL-SCNC: 38 MMOL/L
CREAT SERPL-MCNC: 2.4 MG/DL
CREAT SERPL-MCNC: 2.4 MG/DL
DIFFERENTIAL METHOD: ABNORMAL
EOSINOPHIL # BLD AUTO: 0.1 K/UL
EOSINOPHIL NFR BLD: 0.6 %
ERYTHROCYTE [DISTWIDTH] IN BLOOD BY AUTOMATED COUNT: 15.1 %
EST. GFR  (AFRICAN AMERICAN): 38.7 ML/MIN/1.73 M^2
EST. GFR  (AFRICAN AMERICAN): 38.7 ML/MIN/1.73 M^2
EST. GFR  (NON AFRICAN AMERICAN): 33.4 ML/MIN/1.73 M^2
EST. GFR  (NON AFRICAN AMERICAN): 33.4 ML/MIN/1.73 M^2
FACT X PPP CHRO-ACNC: 0.25 IU/ML
FACT X PPP CHRO-ACNC: 0.25 IU/ML
FACT X PPP CHRO-ACNC: 0.76 IU/ML
GLUCOSE SERPL-MCNC: 148 MG/DL
GLUCOSE SERPL-MCNC: 171 MG/DL
HCO3 UR-SCNC: 42.3 MMOL/L (ref 24–28)
HCT VFR BLD AUTO: 46.3 %
HGB BLD-MCNC: 15.5 G/DL
HYPOCHROMIA BLD QL SMEAR: ABNORMAL
INR PPP: 1.1
LYMPHOCYTES # BLD AUTO: 0.8 K/UL
LYMPHOCYTES NFR BLD: 6.8 %
MAGNESIUM SERPL-MCNC: 2.3 MG/DL
MAGNESIUM SERPL-MCNC: 2.5 MG/DL
MCH RBC QN AUTO: 30.5 PG
MCHC RBC AUTO-ENTMCNC: 33.5 %
MCV RBC AUTO: 91 FL
MONOCYTES # BLD AUTO: 2 K/UL
MONOCYTES NFR BLD: 16.9 %
NEUTROPHILS # BLD AUTO: 8.8 K/UL
NEUTROPHILS NFR BLD: 75.1 %
PCO2 BLDA: 61.3 MMHG (ref 35–45)
PH SMN: 7.45 [PH] (ref 7.35–7.45)
PHOSPHATE SERPL-MCNC: 3.6 MG/DL
PLATELET # BLD AUTO: 72 K/UL
PLATELET BLD QL SMEAR: ABNORMAL
PMV BLD AUTO: ABNORMAL FL
PO2 BLDA: 40 MMHG (ref 40–60)
POC BE: 18 MMOL/L
POC SATURATED O2: 75 % (ref 95–100)
POC TCO2: 44 MMOL/L (ref 24–29)
POLYCHROMASIA BLD QL SMEAR: ABNORMAL
POTASSIUM SERPL-SCNC: 4.8 MMOL/L
POTASSIUM SERPL-SCNC: 5.7 MMOL/L
PROT SERPL-MCNC: 7.6 G/DL
PROTHROMBIN TIME: 11.8 SEC
RBC # BLD AUTO: 5.08 M/UL
SAMPLE: ABNORMAL
SITE: ABNORMAL
SODIUM SERPL-SCNC: 133 MMOL/L
SODIUM SERPL-SCNC: 137 MMOL/L
WBC # BLD AUTO: 11.7 K/UL

## 2017-03-06 PROCEDURE — 27000221 HC OXYGEN, UP TO 24 HOURS

## 2017-03-06 PROCEDURE — 84100 ASSAY OF PHOSPHORUS: CPT

## 2017-03-06 PROCEDURE — 99232 SBSQ HOSP IP/OBS MODERATE 35: CPT | Mod: ,,, | Performed by: INTERNAL MEDICINE

## 2017-03-06 PROCEDURE — 85610 PROTHROMBIN TIME: CPT

## 2017-03-06 PROCEDURE — 27000202 HC IABP, ADD'L DAY

## 2017-03-06 PROCEDURE — 25000003 PHARM REV CODE 250: Performed by: INTERNAL MEDICINE

## 2017-03-06 PROCEDURE — 80048 BASIC METABOLIC PNL TOTAL CA: CPT

## 2017-03-06 PROCEDURE — C9113 INJ PANTOPRAZOLE SODIUM, VIA: HCPCS | Performed by: INTERNAL MEDICINE

## 2017-03-06 PROCEDURE — 20000000 HC ICU ROOM

## 2017-03-06 PROCEDURE — 80053 COMPREHEN METABOLIC PANEL: CPT

## 2017-03-06 PROCEDURE — 93010 ELECTROCARDIOGRAM REPORT: CPT | Mod: ,,, | Performed by: INTERNAL MEDICINE

## 2017-03-06 PROCEDURE — 99900035 HC TECH TIME PER 15 MIN (STAT)

## 2017-03-06 PROCEDURE — 85520 HEPARIN ASSAY: CPT

## 2017-03-06 PROCEDURE — 83735 ASSAY OF MAGNESIUM: CPT

## 2017-03-06 PROCEDURE — 63600175 PHARM REV CODE 636 W HCPCS: Performed by: INTERNAL MEDICINE

## 2017-03-06 PROCEDURE — 82803 BLOOD GASES ANY COMBINATION: CPT

## 2017-03-06 PROCEDURE — 85025 COMPLETE CBC W/AUTO DIFF WBC: CPT

## 2017-03-06 PROCEDURE — 25000003 PHARM REV CODE 250: Performed by: PHYSICIAN ASSISTANT

## 2017-03-06 PROCEDURE — 85520 HEPARIN ASSAY: CPT | Mod: 91

## 2017-03-06 PROCEDURE — 63600367 HC NITRIC OXIDE PER HOUR

## 2017-03-06 PROCEDURE — 93005 ELECTROCARDIOGRAM TRACING: CPT

## 2017-03-06 PROCEDURE — 97165 OT EVAL LOW COMPLEX 30 MIN: CPT

## 2017-03-06 PROCEDURE — 94761 N-INVAS EAR/PLS OXIMETRY MLT: CPT

## 2017-03-06 PROCEDURE — 99233 SBSQ HOSP IP/OBS HIGH 50: CPT | Mod: ,,, | Performed by: INTERNAL MEDICINE

## 2017-03-06 RX ORDER — PANTOPRAZOLE SODIUM 40 MG/1
40 TABLET, DELAYED RELEASE ORAL DAILY
Status: DISCONTINUED | OUTPATIENT
Start: 2017-03-07 | End: 2017-03-07

## 2017-03-06 RX ADMIN — LEVOCARNITINE 3.3 ML: 1 SOLUTION ORAL at 09:03

## 2017-03-06 RX ADMIN — HEPARIN SODIUM AND DEXTROSE 19 UNITS/KG/HR: 10000; 5 INJECTION INTRAVENOUS at 04:03

## 2017-03-06 RX ADMIN — LEVOCARNITINE 3.3 ML: 1 SOLUTION ORAL at 08:03

## 2017-03-06 RX ADMIN — Medication 400 MG: at 08:03

## 2017-03-06 RX ADMIN — HEPARIN SODIUM AND DEXTROSE 18 UNITS/KG/HR: 10000; 5 INJECTION INTRAVENOUS at 09:03

## 2017-03-06 RX ADMIN — LEVETIRACETAM 750 MG: 750 TABLET, FILM COATED ORAL at 08:03

## 2017-03-06 RX ADMIN — PANTOPRAZOLE SODIUM 40 MG: 40 INJECTION, POWDER, FOR SOLUTION INTRAVENOUS at 08:03

## 2017-03-06 RX ADMIN — DOBUTAMINE IN DEXTROSE 5 MCG/KG/MIN: 400 INJECTION, SOLUTION INTRAVENOUS at 06:03

## 2017-03-06 RX ADMIN — LEVETIRACETAM 750 MG: 750 TABLET, FILM COATED ORAL at 09:03

## 2017-03-06 NOTE — PROGRESS NOTES
1600: Per Dr. Pavon restart heparin gtt at 18 U/kg/hr.  While on the phone with MD pts HR changed and is reading 160s on monitor. EKG ordered and obtained.  Dr. Pavon to bedside.  Labs sent.

## 2017-03-06 NOTE — PLAN OF CARE
Problem: Patient Care Overview  Goal: Plan of Care Review  Outcome: Ongoing (interventions implemented as appropriate)  POC reviewed with pt and his sisters.  All questions and concerns addressed.  IABP and swan removed today.  Nitric weaned to 10ppm.  Right groin site without complications.  Pt ate a small salad today.  See epic for full charting.

## 2017-03-06 NOTE — PROGRESS NOTES
Left Ventricular Assist Device (LVAD) and Transplant Recipient Adult Psychosocial Assessment    Binh Conde   [address]  [place] [address]    Aunt Wendy [place]'s address:  [address]   [facility] [address]    No relevant phone numbers on file.   Home  [phone] (home)  Work  There is no work phone number on file.  E-mail  No e-mail address on record    Sex: male  YOB: 1980  Age: 36 y.o.    Encounter Date: 3/6/2017  [place] Citizen: yes  Primary Language: English   Needed: no    Emergency Contact:  Name: Deisi Sewell  Relationship: sister  [address] [address] [facility]  Phone Numbers:  [phone] (mobile)    Family/Social Support:   Number of dependents/: pt has a 15 yr old son that lives in [place] with his grandmother  Marital history: never   Other family dynamics: pt's parents are . Pt has 2 sisters, 1 brother, and an aunt.     Household Composition:  Pt lives alone, but plans to stay with sister Deisi after surgery    Do you and your caregivers have access to reliable transportation? yes  PRIMARY CAREGIVER: Deisi will be primary caregiver, phone number [phone].      provided in-depth information to Patient and Caregiver regarding  regarding pre- and post-LVAD and pre- and post-transplant caregiver role.   strongly encourages Patient and Caregiver to have concrete plan regarding post-transplant care giving, including back-up caregiver(s) to ensure care giving needs are met as needed.    Patient and Caregiver states understanding all aspects of caregiver role/commitment. .      Patient and Caregiver verbalizes understanding of the education provided today.       remains available. Patient and Caregiver agree to contact  in a timely manner if concerns arise.      Able to take time off work without financial concerns: yes. Pt's sister is a  and has time  during the day to be with pt. Pt's back up care giver, sister Maia Conde, will stay with pt when Deisi is at work.     Additional Significant Others who will Assist with LVAD/Transplant:  Name: Maia Conde  City: [place], [place]  Relationship: sister  Does person drive? yes   Working? No  Phone: [phone]    Name: Hans Conde  City: [place], [place]  Relationship: brother  Does person drive? yes   Phone: [phone]    Living Will: no  Healthcare Power of : no  Advance Directives on file: <<no information> per medical record.  Verbally reviewed LW/HCPA information.   provided patient with copy of LW/HCPA documents and provided education on completion of forms    Highest Education Level: High School (9-12) or GED  Reading Ability: 5th grade  Reports difficulty with: reading, writing, comprehension, learning and memory. Pt's sister reports pt cannot reliably manage his own care. SW requested a neuro cog assessment and confirmed consult was placed  Learns Best By:  Demonstration and hands on     Status: no  VA Benefits: no     Working for Income: No  If no, reason not working: Disability  Spouse/Significant Other Employment: n/a    Disabled: yes: date disability began: 2004, due to: seizures.    Monthly Income:   Disability: $755  Able to afford all costs now and if transplanted or receives LVAD, including medications: yes  Pt reports secure power source? yes  Pt reports ability to afford monthly electric bill? yes  Pt reports ability to afford LVAD dressing supplies? yes     Pt's sister reports pt is able to afford his household expenses and plans to keep his apartment while he is living with her after surgery. Pt's sister confirms she is able to provide financial support to pt, if needed.     Patient and Caregiver verbalizes understanding of personal responsibilities related to LVAD and transplant costs and the importance of having a financial plan to ensure that  patients LVAD and transplant costs are fully covered.       provided fundraising information/education.  Patient and Caregiver verbalizes understanding.   remains available.    Insurance:   Payor/Plan Subscr  Sex Relation Sub. Ins. ID Effective Group Num   1. [place] - [place]* LUCIANA KING 1980 Male  [id number]                                    [address]   2. [place]UC[place] KING 1980 Male  [id number]* 3/1/17                                    [address] [address]     Primary Insurance (for UNOS reporting): Public Insurance - Medicare FFS (Fee For Service)  Secondary Insurance (for UNOS reporting): Public Insurance - Medicaid  Patient and Caregiver verbalizes clear understanding that patient may experience difficulty obtaining and/or be denied insurance coverage post-surgery. This includes and is not limited to disability insurance, life insurance, health insurance, burial insurance, long term care insurance, and other insurances.      Patient and Caregiver also reports understanding that future health concerns   related to or unrelated to LVAD or transplantation may not be covered by patient's insurance.  Resources and information provided and reviewed.      Patient and Caregiver provides verbal permission to release any necessary information to outside resources for patient care and discharge planning.  Resources and information provided are reviewed.      Infusion Service: patient utilizing? no  Home Health: patient utilizing? no  DME: no  Pulmonary/Cardiac Rehab: no  ADLS:  Prior to admission, pt reports was independent with ADLs and was not driving    Adherence:   Pt and sister confirm a high level of adherence.  Adherence education and counseling provided.     Per History Section:  Past Medical History:   Diagnosis Date    CHF (congestive heart failure)     Hypertension     Seizures      Social History   Substance Use Topics    Smoking status:  Never Smoker    Smokeless tobacco: Not on file    Alcohol use No     History   Drug Use No     History   Sexual Activity    Sexual activity: Not Currently    Partners: Female       Per Today's Psychosocial:  Tobacco: none, patient denies any use.  Alcohol: none, patient denies any use.  Illicit Drugs/Non-prescribed Medications: pt's family reports pt started using marijuana regularly in 2005 and within the last 6 months has started using daily. Pt's family reports marijuana seems to help manage pt's anxiety. Pt states he plans to stop use.    Patient and Caregiver states clear understanding of the potential impact of substance use as it relates to LVAD and transplant candidacy and is aware of possible random substance screening.  Substance abstinence/cessation counseling, education and resources provided and reviewed.     Arrests/DWI/Treatment/Rehab: yes. Pt's sister states pt was arrested 2 times about a year ago. Pt's sister states there was a misunderstanding with a neighbor and the police were called. Pt was arrested because he became confused and did not follow police commands. Sister states charges were dropped and there have been no further incidents.     Psychiatric History:    Mental Health: depression and anxiety. Pt's family reports pt has struggled with both since mother's death immediately following [place].   Psychiatrist/Counselor: pt and family deny  Medications: pt and family deny. Pt's family states pt's marijuana use seemed to help his anxiety  Suicide/Homicide Issues: pt and family deny   Safety at home: pt confirms    Knowledge: Patient and Caregiver states having clear understanding and realistic expectations regarding the potential risks and potential benefits LVAD implantation and organ transplantation and organ donation and agrees to discuss with health care team members and support system members, as well as to utilize available resources and express questions and/or concerns in order  to further facilitate the pt informed decision-making.  Resources and information provided and reviewed.    Patient and Caregiver is aware of [facility]'s affiliation and/or partnership with agencies in home health care, LTAC, SNF, [facility], and other hospitals and clinics.    Understanding: Patient and Caregiver reports having a clear understanding of the many lifetime commitments involved with being an LVAD and transplant recipient, including costs, compliance, medications, lab work, procedures, appointments, concrete and financial planning, preparedness, timely and appropriate communication of concerns, abstinence (ETOH, tobacco, illicit non-prescribed drugs), adherence to all health care team recommendations, support system and caregiver involvement, appropriate and timely resource utilization and follow-through, mental health counseling as needed/recommended, and patient and caregiver responsibilities.  Social Service Handbook, resources and detailed educational information provided and reviewed.  Educational information provided.    Patient and Caregiver also states having a clear understanding of the importance of compliance.       Patient and Caregiver reports a clear understanding that risks and benefits may be involved with LVAD heart failure treatment and organ transplantation and with organ donation.     Patient and Caregiver also reports clear understanding that psychosocial risk factors may affect patient, and include but are not limited to feelings of depression, generalized anxiety, anxiety regarding dependence on others, post traumatic stress disorder, feelings of guilt and other emotional and/or mental concerns, and/or exacerbation of existing mental health concerns.  Detailed resources provided and discussed.      Patient and Caregiver agrees to access appropriate resources in a timely manner as needed and/or as recommended, and to communicate concerns appropriately.     Patient and Caregiver also reports  a clear understanding of treatment options available.      Feelings or Concerns: Pt denies any questions or concerns at this time. Pt's family reports they are very supportive of pt and will provide the care he needs.    Coping: Pt indicates he is coping adequately at this time.    Interview Behavior: Patient presents as drowsy and demonstrates poor concentration as well as some confusion.  Pt's sisters Deisi and Maia and Aunt Wendy. Pt's sister Deisi answered majority of questions on behalf of pt.         Transplant Social Work - Candidacy  Assessment/Plan:     Psychosocial Suitability: Patient presents as a high risk candidate for LVAD at this time, due to inability to reliably manage his own care (reported by sister) and daily marijuana use prior to admission.  Pt is an unsuitable candidate for transplant at this time.       Delfina Kamara LMSW

## 2017-03-06 NOTE — SUBJECTIVE & OBJECTIVE
Neurologic Chief Complaint: See HPI    Subjective:      Interval History: Patient is seen for follow-up neurological assessment and treatment recommendations: See below    HPI, Past Medical, Family, and Social History remains the same as documented in the initial encounter.     Review of Systems   Unable to perform ROS: Mental status change     Scheduled Meds:   coenzyme Q10  400 mg Oral Daily    levetiracetam  750 mg Oral BID    levocarnitine  330 mg Oral BID    pantoprazole  40 mg Intravenous Daily    sodium chloride 0.9%  3 mL Intravenous Q8H     Continuous Infusions:   DOBUTamine 5 mcg/kg/min (03/06/17 0701)    furosemide (LASIX) 5 mg/mL infusion (non-titrating) 10 mg/hr (03/06/17 0701)    heparin (porcine) in D5W 19 Units/kg/hr (03/06/17 0701)    nitric oxide gas       PRN Meds:alprazolam, heparin (PORCINE), heparin (PORCINE)    Objective:     Vital Signs (Most Recent):  Temp: 98.3 °F (36.8 °C) (03/06/17 0300)  Pulse: 87 (03/06/17 0701)  Resp: (!) 25 (03/06/17 0701)  BP: (!) 97/53 (03/03/17 0500)  SpO2: 100 % (03/06/17 0701)       Vital Signs Range (Last 24H):  Temp:  [97.4 °F (36.3 °C)-98.7 °F (37.1 °C)]   Pulse:  []   Resp:  [12-38]   SpO2:  [97 %-100 %]        Physical Exam   Constitutional: He appears well-developed and well-nourished.   HENT:   Head: Atraumatic.   Eyes: EOM are normal. Pupils are equal, round, and reactive to light.   Neck: Normal range of motion. Neck supple.   Cardiovascular: Normal rate, regular rhythm and normal heart sounds.    Neurological: He is alert. GCS eye subscore is 4 - spontaneous. GCS verbal subscore is 1 - none. GCS motor subscore is 6 - obeys commands.   Diaphoretic, cool with right gaze preference. Spontaneous movement RUE with automatisms. No movement against gravity of LUE, BLE. No response to deep pain LUE and BLE. Left facial weakness noted. No hemianopsia.   Skin: He is diaphoretic.       Neurological Exam:   LOC: alert and follows requests  Language:  No aphasia, Mute  Speech: No dysarthria, Mute  Orientation: Person, Mute  Memory: Mute  Visual Fields (CN II): Full  EOM (CN III, IV, VI): Gaze preference right  Oculocephalics: normal  Pupils (CN III, IV, VI): PERRL  Facial Movement (CN VII): symmetric facial expression and upper weakness left upper  Reflexes: flexor plantar responses bilaterally and 2+ throughout  Motor*: Arm Left:  Plegic (0/5), Leg Left:   Plegic (0/5), Arm Right:   Normal (5/5), Leg Right:   Plegic (0/5)  Cerebellar*: Not testable due to LOC  Sensation: No response to deep pain LUE and BLE    NIH Stroke Scale:  Interval: with any deterioration/ worsening of neurologic condition  Level of Consciousness: 0 - alert  LOC Questions: 2 - answers none correctly  LOC Commands: 0 - performs both correctly  Best Gaze: 1 - partial gaze palsy  Visual: 0 - no visual loss  Facial Palsy: 1 - minor  Motor Left Arm: 4 - no movement  Motor Right Arm: 0 - no drift  Motor Left Le - no movement  Motor Right Le - no movement  Limb Ataxia: 0 - absent  Sensory: 0 - normal  Best Language: 3 - mute  Dysarthria: 2 - near to unintelligible  Extinction and Inattention: 0 - no neglect  NIH Stroke Scale Total: 21  Freedom Coma Scale:  Best Eye Response: 4 - spontaneous  Best Motor Response: 6 - obeys commands  Best Verbal Response: 1 - none  Hussein Coma Scale Total: 11  Modified Swain Scale:   Timeline: Prior to symptoms onset  Modified Justin Score: 0 - no symptoms    ABCD2 Scale for TIA:   Age > or = 60: 0 - no  B/P or = 140/9 at Initial Evaluation: 0 - no  Clinical Features of TIA: 2 - unilateral weakness  Duration of Symptoms: 1 - 10-59 minutes  Diabetes Mellitus in History: 0 - no  ABCD2 Scale Total: 3  [id number] Scale:   Age: 0 - < 65 years old  CHF History: 1 - yes  HTN History: 0 - no  Stroke/TIA/Thromboembolism History: 2 - yes  Vascular Disease History: 0 - no  Diabetes Mellitus in History: 0 - no  Female: 0 - no  [id number] Scale Total:  3      Laboratory:  CMP:     Recent Labs  Lab 03/09/17  0442   CALCIUM 9.5   ALBUMIN 3.2*   PROT 7.3   *   K 5.0   CO2 27   CL 89*   BUN 71*   CREATININE 2.4*   ALKPHOS 193*   *   AST 51*   BILITOT 2.8*     CBC:     Recent Labs  Lab 03/09/17  0442   WBC 11.32   RBC 4.67   HGB 14.1   HCT 41.6   *   MCV 89   MCH 30.2   MCHC 33.9     Lipid Panel: No results for input(s): CHOL, LDLCALC, HDL, TRIG in the last 168 hours.  Coagulation:   Recent Labs  Lab 03/03/17  0630  03/06/17  0344   INR 1.7*  < > 1.1   APTT 30.1  --   --    < > = values in this interval not displayed.  Platelet Aggregation Study: No results for input(s): PLTAGG, PLTAGINTERP, PLTAGREGLACO, ADPPLTAGGREG in the last 168 hours.  Hgb A1C: No results for input(s): HGBA1C in the last 168 hours.  TSH: No results for input(s): TSH in the last 168 hours.    Diagnostic Results:  I have personally reviewed: reviewed imaging  Findings: reviewed imaging

## 2017-03-06 NOTE — PLAN OF CARE
Problem: Patient Care Overview  Goal: Plan of Care Review  Pt AAOx4. NSR. Afebrile. BP within normal limits. Gtts per MAR. IABP in place with no acute issues over night. Distal pulses 2+. Morning SVo2 75. Swanz numbers per flowsheet. Rahman with good UO. Pt with 2 small BM's.still with lack of appetite. POC updated with pt and questions/concerns addressed.

## 2017-03-06 NOTE — PROGRESS NOTES
It is recorded that Mr. Conde was received in hospital to hospital transfer by physician for higher level of care by Dr. Rosario from [facility].  He arrived in Cardiogenic Shock.   He carries the diagnosis of : MELAS (mitochondrial encephalopathy, lactic acidosis and stroke-like episodes).  To date physician provider staff have advised he has been to ill to travel for ordered CT scans.

## 2017-03-06 NOTE — PROGRESS NOTES
Electrophysiology Progress Note  Attending Physician: Landon Galeano MD  Hospital Day: 4    Subjective:   Interval History: Patient seen and examined, no events overnight, Tele showing sinus tachycardia , EPI off since yesterday , DBT 5 and still on IABP 1:3    Tele showing NSR in 70's     Medications:   Continuous Infusions:   DOBUTamine 5 mcg/kg/min (03/06/17 1200)    furosemide (LASIX) 5 mg/mL infusion (non-titrating) 10 mg/hr (03/06/17 1200)    heparin (porcine) in D5W Stopped (03/06/17 1156)    nitric oxide gas         Scheduled Meds:   coenzyme Q10  400 mg Oral Daily    levetiracetam  750 mg Oral BID    levocarnitine  330 mg Oral BID    [START ON 3/7/2017] pantoprazole  40 mg Oral Daily    sodium chloride 0.9%  3 mL Intravenous Q8H     PRN Meds:alprazolam, heparin (PORCINE), heparin (PORCINE)  Objective:     Vitals:  Temp:  [97.4 °F (36.3 °C)-98.5 °F (36.9 °C)]   Pulse:  []   Resp:  [12-38]   BP: (89-92)/(65-69)   SpO2:  [97 %-100 %]  I/O's:    Intake/Output Summary (Last 24 hours) at 03/06/17 1238  Last data filed at 03/06/17 1200   Gross per 24 hour   Intake           762.55 ml   Output             2445 ml   Net         -1682.45 ml        Constitutional: NAD, conversant  HEENT: Sclera anicteric, PERRLA, EOMI  Neck: Unable to visualize JVD, no carotid bruits  CV: RRR, no murmur, normal S1/S2  Pulm: CTAB, no wheezes, rales, or ronchi  GI: Abdomen soft, NTND, +BS  Extremities: No LE edema, warm and well perfused  Skin: No ecchymosis, erythema, or ulcers  Psych: AOx3, appropriate affect  Neuro: CNII-XII intact, no focal deficits    Labs:       Recent Labs  Lab 03/04/17  0320 03/05/17  0330 03/06/17  0344    139 137   K 4.3 3.9 4.8   CL 90* 86* 85*   CO2 32* 37* 38*   BUN 82* 65* 66*   CREATININE 2.9* 2.4* 2.4*   * 274* 148*   ANIONGAP 15 16 14       Recent Labs  Lab 03/04/17  0320 03/05/17  0330 03/06/17  0344   * 184* 104*   * 552* 393*   ALKPHOS 176* 220* 220*    BILITOT 4.5* 3.8* 3.9*   ALBUMIN 3.1* 3.4* 3.6        Recent Labs  Lab 03/04/17  0320 03/05/17  0330 03/06/17  0344   WBC 12.46 11.10 11.70   HGB 13.6* 14.9 15.5   HCT 39.0* 43.8 46.3   PLT 81* 77* 72*   GRAN 78.8*  9.8* 73.6*  8.2* 75.1*  8.8*       Recent Labs  Lab 03/04/17  0320 03/05/17  0330 03/06/17  0344   INR 1.4* 1.2 1.1       Recent Labs  Lab 03/03/17  0630   BNP 2508*      Micro:   Blood Cultures  No results found for: LABBLOO  Urine Cultures  No results found for: LABURIN    Imaging:     EF   Date Value Ref Range Status   03/03/2017 10 (A) 55 - 65    03/03/2017 10 (A) 55 - 65             Assessment&Plan:   36 y.o. gentleman with PMH of HFrEF (EF <10%) 2/2 NICM s/p ICD (implanted 12/2013), MELAS (mitochondrial encephalopathy, lactic acidosis, and stroke-like episodes), Hx of LV thrombus,CKD, seizure disorder, who is now transferred to [facility] from [facility] in cardiogenic shock and consideration for heart transplant and/or advanced options.EP consulted for typical Aflutter with 2:1 block:     -S/P successful RENÉE+DCCV on 3/3/2017  -Continue on anticoagulation   -ICD interrogated and he has [employer] single lead ICD , interrogation was reviewed with Dr Reyez looking closely looks like he has HR around 120 for about 2 weeks and that is possibly AFlutter  -Will need CTI RFA for typical AFlutter when more stable and off IABP  -EP will follow       Lazaro Harden MD  Cardiology Fellow  Pager: [phone]    Seen with fellow. I agree with note above and the plan devised in concert with the fellow.

## 2017-03-06 NOTE — PT/OT/SLP EVAL
"Occupational Therapy  Evaluation    Binh Conde   MRN: [id number]   Admitting Diagnosis: Cardiogenic shock    OT Date of Treatment: 17   OT Start Time: 101  OT Stop Time: 1032  OT Total Time (min): 20 min    Billable Minutes:  Evaluation 16    Diagnosis: Cardiogenic shock   S/p cardioversion 3/3; pt admitted for OHT/LVAD work-up    Past Medical History:   Diagnosis Date    CHF (congestive heart failure)     Hypertension     Seizures       History reviewed. No pertinent surgical history.    Referring physician: Amber  Date referred to OT: 3/3/17    General Precautions: Standard, fall, IABP, [place] Trice catheter (PA cordis)  Orthopedic Precautions:    Braces:      Do you have any cultural, spiritual, Mormonism conflicts, given your current situation?: none     Patient History:  Living Environment  Lives With: alone  Living Arrangements: apartment (first floor/no concerns)  Transportation Available: family or friend will provide  Living Environment Comment: Pt can d/c to sister's home if need be  Equipment Currently Used at Home: none    Prior level of function:   Bed Mobility/Transfers: independent  Grooming: independent  Bathing: independent  Upper Body Dressing: independent  Lower Body Dressing: independent  Toileting: independent  Driving License: No  Occupation: Unemployed     Dominant hand: right    Subjective:  Communicated with RN prior to session.    Chief Complaint: "They told me not to raise up my arms." (per sister, this was when pt had arterial line in place)  Patient/Family stated goals: to get better    Pain Ratin/10              Pain Rating Post-Intervention: 0/10    Objective:  Patient found with: blood pressure cuff, pulse ox (continuous), telemetry, pool catheter, peripheral IV (IABP, [place]-Trice catheter)    Cognitive Exam:  Oriented to: Person, Place, Time and Situation  Follows Commands/attention: Follows two-step commands  Communication: clear/fluent    Physical Exam:  Postural " examination/scapula alignment: no abnormalities noted in supine  Skin integrity: Visible skin intact   Edema: None noted     Sensation:   Intact    Upper Extremity Range of Motion:  Right Upper Extremity: WFL  Left Upper Extremity: WFL    Upper Extremity Strength:  Right Upper Extremity: WFL  Left Upper Extremity: WFL   Strength: Fair    Functional Mobility:  Bed Mobility:    (NT 2* IABP)    Transfers:   (NT 2* IABP)    Functional Ambulation: NT    Activities of Daily Living:  Feeding Level of Assistance: Set-up Assistance  Feeding adaptive equipment:   UE Dressing Level of Assistance: Maximum assistance  UE adaptive equipment:   LE Dressing Level of Assistance: Total assistance  LE adaptive equipment:   Grooming Position: other (supine)  Grooming Level of Assistance: Stand by assistance  Toileting Where Assessed: Bed level  Toileting Level of Assistance: Total assistance        Therapeutic Activities and Exercises:  Pt and family ed on ROM ex's to be performed 3-4x daily to increase overall strength/endurance while on bedrest d/t IABP. Red Theraband was left at pt's b/s 3/5/17, however, misplaced, thus HEP not performed this session. Pt and family ed on importance of activity participation as pt required significant encouragement to participate. OT discussed role of therapy now and in particular post-VAD or post-OHTX.       AM-PAC 6 CLICK ADL  How much help from another person does this patient currently need?  1 = Unable, Total/Dependent Assistance  2 = A lot, Maximum/Moderate Assistance  3 = A little, Minimum/Contact Guard/Supervision  4 = None, Modified [place]/Independent    Putting on and taking off regular lower body clothing? : 1  Bathing (including washing, rinsing, drying)?: 1  Toileting, which includes using toilet, bedpan, or urinal? : 1  Putting on and taking off regular upper body clothing?: 2  Taking care of personal grooming such as brushing teeth?: 3  Eating meals?: 4  Total Score:  "12    AM-PAC Raw Score CMS "G-Code Modifier Level of Impairment Assistance   6 % Total / Unable   7 - 9 CM 80 - 100% Maximal Assist   10 - 14 CL 60 - 80% Moderate Assist   15 - 19 CK 40 - 60% Moderate Assist   20 - 22 CJ 20 - 40% Minimal Assist   23 CI 1-20% SBA / CGA   24 CH 0% Independent/ Mod I       Patient left supine with all lines intact, call button in reach, RN notified and sisters present    Assessment:  Binh Conde is a 36 y.o. male with a medical diagnosis of Cardiogenic shock and presents with IABP in place, thus on bedrest. Pt demonstrated decreased motivation .    Rehab identified problem list/impairments: Rehab identified problem list/impairments: weakness, impaired endurance, impaired self care skills, impaired cardiopulmonary response to activity, other (comment) (bedrest d/t IABP)    Rehab potential is good.    Activity tolerance: Fair    Discharge recommendations: Discharge Facility/Level Of Care Needs:  (TBD cloer to d/c)     Barriers to discharge: Barriers to Discharge: Decreased caregiver support    Equipment recommendations:  (TBD closer to d/c)     GOALS:   Occupational Therapy Goals        Problem: Occupational Therapy Goal    Goal Priority Disciplines Outcome Interventions   Occupational Therapy Goal     OT, PT/OT Ongoing (interventions implemented as appropriate)    Description:  Goals to be met by: 3/16/17     Patient will increase functional independence with ADLs by performing:    UE Dressing with Supervision.  LE Dressing with Supervision.  Grooming while standing at sink with Supervision.  Toileting from toilet with Supervision for hygiene and clothing management.   Toilet transfer to toilet with Supervision.  Upper extremity exercise program x10 reps per handout, with independence.                PLAN:  Patient to be seen 3 x/week to address the above listed problems via self-care/home management, therapeutic activities, therapeutic exercises  Plan of Care expires: " 04/05/17  Plan of Care reviewed with: patient, sibling         Ne HOUSTON Pablo, JOHN  03/06/2017

## 2017-03-06 NOTE — ASSESSMENT & PLAN NOTE
Patient with history of MELAS transferred with cardiogenic shock 2/2 HFrEF from [facility] for consideration for heart transplant and/or advanced options. Vascular neurology consulted for candidacy for these options.     Recommendations:   -After reviewing the very limited literature available on the topic there seems to be no contraindication for transplant or advanced options  in this patient from vascular neurology perspective. Does not seem to be significant systemic or organ involvement besides brain and heart.   -continue CoQ and levocarnitine  -As this is common cause of stroke in children and young adults and he has focal neurological changes, Stat CT of brain

## 2017-03-06 NOTE — PROGRESS NOTES
IABP removed at bedside at 1230pm with no complications. Minimal blood loss. Pressure held to right groin for 24 minutes. +1 pulses DP and PT bilaterally. Hemodynamically stable. Will restart heparin at 4pm.    Savanah CALLAHAN

## 2017-03-06 NOTE — ASSESSMENT & PLAN NOTE
On keppra. Possible seizure this morning with post-ictal state but he has a right gaze preference that is more suggestive of stroke given LUE and BLE plegia.    Stat EEG  Stat CT - negative for new stroke.  Gas in cavernous sinuses and right cerebrum. ? R to L shunt.

## 2017-03-06 NOTE — PROGRESS NOTES
Progress Note  HTS    Admit Date: 3/3/2017   LOS: 3 days     SUBJECTIVE:     Patient seen and examined. Denies chest pain or shortness of breath. NAEO. Outstanding UOP. Weaned off epi.     Scheduled Meds:   coenzyme Q10  400 mg Oral Daily    levetiracetam  750 mg Oral BID    levocarnitine  330 mg Oral BID    pantoprazole  40 mg Intravenous Daily    sodium chloride 0.9%  3 mL Intravenous Q8H     Continuous Infusions:   DOBUTamine 5 mcg/kg/min (03/06/17 0800)    furosemide (LASIX) 5 mg/mL infusion (non-titrating) 10 mg/hr (03/06/17 0800)    heparin (porcine) in D5W 19 Units/kg/hr (03/06/17 0800)    nitric oxide gas       PRN Meds:alprazolam, heparin (PORCINE), heparin (PORCINE)    Review of patient's allergies indicates:   Allergen Reactions    Aspirin     Bactrim [sulfamethoxazole-trimethoprim]     Depakote [divalproex]     Dilantin [phenytoin sodium extended]     Lorazepam     Phenobarbital     Tegretol [carbamazepine]     Thallium-201        OBJECTIVE:     Vital Signs (Most Recent)  Temp: 98.3 °F (36.8 °C) (03/06/17 0300)  Pulse: 86 (03/06/17 0754)  Resp: (!) 27 (03/06/17 0754)  BP: (!) 97/53 (03/03/17 0500)  SpO2: 100 % (03/06/17 0754)    Vital Signs Range (Last 24H):  Temp:  [97.4 °F (36.3 °C)-98.7 °F (37.1 °C)]   Pulse:  []   Resp:  [12-38]   SpO2:  [97 %-100 %]     I & O (Last 24H):    Intake/Output Summary (Last 24 hours) at 03/06/17 0837  Last data filed at 03/06/17 0800   Gross per 24 hour   Intake           477.77 ml   Output             2520 ml   Net         -2042.23 ml       Physical Exam:   General: Patient in no acute distress or discomfort; IABP in place  HEENT: No JVD, moist mucous membranes  Cardiac: S1S2 RRR  Chest: CTABL, no wheezing or rales  Abd:Soft NTND  Ext: No Edema No swelling  Neuro: A and O X 3, non focal      LABS  CBC with Diff:     Recent Labs  Lab 03/04/17  0320 03/05/17  0330 03/06/17  0344   WBC 12.46 11.10 11.70   HGB 13.6* 14.9 15.5   HCT 39.0* 43.8 46.3    PLT 81* 77* 72*   LYMPH 8.5*  1.1 17.8*  2.0 6.8*  0.8*   MONO 12.5  1.6* 7.6  0.8 16.9*  2.0*   EOSINOPHIL 0.1 0.5 0.6       COAG:    Recent Labs  Lab 03/03/17  0630 03/04/17  0320 03/05/17  0330 03/06/17  0344   APTT 30.1  --   --   --    INR 1.7* 1.4* 1.2 1.1       CMP:     Recent Labs  Lab 03/04/17  0320 03/05/17  0330 03/06/17  0344   * 274* 148*   CALCIUM 8.5* 9.4 10.1   ALBUMIN 3.1* 3.4* 3.6   PROT 6.1 7.2 7.6    139 137   K 4.3 3.9 4.8   CO2 32* 37* 38*   CL 90* 86* 85*   BUN 82* 65* 66*   CREATININE 2.9* 2.4* 2.4*   ALKPHOS 176* 220* 220*   * 552* 393*   * 184* 104*   BILITOT 4.5* 3.8* 3.9*   MG 2.1 2.3 2.5   PHOS 3.8 3.0 3.6     Estimated Creatinine Clearance: 32.9 mL/min (based on Cr of 2.4).    .    Recent Labs  Lab 03/03/17  0630   BNP 2508*     SvO2 77    Present on Admission:   Cardiogenic shock   MELAS (mitochondrial encephalopathy, lactic acidosis and stroke-like episodes)   Atrial flutter   NICM (nonischemic cardiomyopathy)   Seizure disorder   CKD (chronic kidney disease) stage 4, GFR 15-29 ml/min   Thrombocytopenia   Coagulopathy   Transaminitis   Hypoalbuminemia      ASSESSMENT / PLAN:   37 Y/O M gentleman with PMH of HFrEF (EF <10%) 2/2 NICM s/p AICD (implanted 12/2013), MELAS (mitochondrial encephalopathy, lactic acidosis, and stroke-like episodes), Hx of LV thrombus, Atrial Flutter, CKD, seizure disorder, who [place] [facility] [place] [facility] in cardiogenic shock and consideration for heart transplant and/or advanced options.    -NICM now in Cardiogenic shock:  IABP in place 1:2 with good augmentation  Continue ; attempt wean of [place]  SVO2 77  CVP 6  Diuresing well with lasix ggt 10 mg/hr  Had arterial US done in case upgrade to Impella 5.0 required  Initiated pathway for evaluation of advanced options, will proceed on Monday.    Atrial FLutter   S/p RENÉE/ DCCV currnetly in sinus  C/W Heparin for anticoagualtion    SOILA  Appreciated Vasc  Neurology consult, no contraindication to proceed with evaluation for advanced options  C/W home medications Kepra, L carnitine and Co enz Q    Cardiac diet with boost  Full Code    Further recommendations per attending addendum    Sergio Pavon MD  PGY-4 ([phone])  Cardiology Fellow

## 2017-03-06 NOTE — PLAN OF CARE
Problem: Occupational Therapy Goal  Goal: Occupational Therapy Goal  Goals to be met by: 3/16/17     Patient will increase functional independence with ADLs by performing:    UE Dressing with Supervision.  LE Dressing with Supervision.  Grooming while standing at sink with Supervision.  Toileting from toilet with Supervision for hygiene and clothing management.   Toilet transfer to toilet with Supervision.  Upper extremity exercise program x10 reps per handout, with independence.  Outcome: Ongoing (interventions implemented as appropriate)  OT eval completed, and above goals established. JOHN Kurtz  3/6/2017

## 2017-03-07 PROBLEM — R57.0 CARDIOGENIC SHOCK: Status: RESOLVED | Noted: 2017-03-02 | Resolved: 2017-03-07

## 2017-03-07 LAB
ALBUMIN SERPL BCP-MCNC: 3.3 G/DL
ALLENS TEST: ABNORMAL
ALP SERPL-CCNC: 210 U/L
ALT SERPL W/O P-5'-P-CCNC: 273 U/L
ANION GAP SERPL CALC-SCNC: 15 MMOL/L
ANISOCYTOSIS BLD QL SMEAR: SLIGHT
AST SERPL-CCNC: 68 U/L
BASO STIPL BLD QL SMEAR: ABNORMAL
BASOPHILS # BLD AUTO: 0.02 K/UL
BASOPHILS NFR BLD: 0.1 %
BILIRUB SERPL-MCNC: 3.6 MG/DL
BUN SERPL-MCNC: 65 MG/DL
CALCIUM SERPL-MCNC: 9.7 MG/DL
CHLORIDE SERPL-SCNC: 85 MMOL/L
CO2 SERPL-SCNC: 33 MMOL/L
CREAT SERPL-MCNC: 2.4 MG/DL
DELSYS: ABNORMAL
DIFFERENTIAL METHOD: ABNORMAL
EOSINOPHIL # BLD AUTO: 0.1 K/UL
EOSINOPHIL NFR BLD: 0.4 %
ERYTHROCYTE [DISTWIDTH] IN BLOOD BY AUTOMATED COUNT: 15.1 %
EST. GFR  (AFRICAN AMERICAN): 38.7 ML/MIN/1.73 M^2
EST. GFR  (NON AFRICAN AMERICAN): 33.4 ML/MIN/1.73 M^2
FACT X PPP CHRO-ACNC: 0.65 IU/ML
FACT X PPP CHRO-ACNC: 0.68 IU/ML
FACT X PPP CHRO-ACNC: 0.72 IU/ML
FACT X PPP CHRO-ACNC: 0.79 IU/ML
FIO2: 36
FLOW: 4
GLUCOSE SERPL-MCNC: 152 MG/DL
HCO3 UR-SCNC: 35.3 MMOL/L (ref 24–28)
HCT VFR BLD AUTO: 45.2 %
HGB BLD-MCNC: 15.2 G/DL
INR PPP: 1.1
LYMPHOCYTES # BLD AUTO: 2 K/UL
LYMPHOCYTES NFR BLD: 14.3 %
MAGNESIUM SERPL-MCNC: 2.3 MG/DL
MCH RBC QN AUTO: 30.6 PG
MCHC RBC AUTO-ENTMCNC: 33.6 %
MCV RBC AUTO: 91 FL
METHEMOGLOBIN: 0.5 % (ref 0–3)
MITRAL VALVE MOBILITY: NORMAL
MITRAL VALVE REGURGITATION: ABNORMAL
MODE: ABNORMAL
MONOCYTES # BLD AUTO: 1 K/UL
MONOCYTES NFR BLD: 7.2 %
NEUTROPHILS # BLD AUTO: 10.8 K/UL
NEUTROPHILS NFR BLD: 77.3 %
PCO2 BLDA: 51.8 MMHG (ref 35–45)
PH SMN: 7.44 [PH] (ref 7.35–7.45)
PHOSPHATE SERPL-MCNC: 4 MG/DL
PLATELET # BLD AUTO: 83 K/UL
PLATELET BLD QL SMEAR: ABNORMAL
PMV BLD AUTO: ABNORMAL FL
PO2 BLDA: 38 MMHG (ref 40–60)
POC BE: 11 MMOL/L
POC SATURATED O2: 73 % (ref 95–100)
POC TCO2: 37 MMOL/L (ref 24–29)
POCT GLUCOSE: 199 MG/DL (ref 70–110)
POLYCHROMASIA BLD QL SMEAR: ABNORMAL
POTASSIUM SERPL-SCNC: 5.7 MMOL/L
PROT SERPL-MCNC: 7.5 G/DL
PROTHROMBIN TIME: 12 SEC
RBC # BLD AUTO: 4.96 M/UL
RETIRED EF AND QEF - SEE NOTES: 10 (ref 55–65)
SAMPLE: ABNORMAL
SITE: ABNORMAL
SODIUM SERPL-SCNC: 133 MMOL/L
TRICUSPID VALVE REGURGITATION: ABNORMAL
WBC # BLD AUTO: 14.01 K/UL

## 2017-03-07 PROCEDURE — 80053 COMPREHEN METABOLIC PANEL: CPT

## 2017-03-07 PROCEDURE — 85520 HEPARIN ASSAY: CPT | Mod: 91

## 2017-03-07 PROCEDURE — 84100 ASSAY OF PHOSPHORUS: CPT

## 2017-03-07 PROCEDURE — 63600367 HC NITRIC OXIDE PER HOUR

## 2017-03-07 PROCEDURE — 85025 COMPLETE CBC W/AUTO DIFF WBC: CPT

## 2017-03-07 PROCEDURE — 94761 N-INVAS EAR/PLS OXIMETRY MLT: CPT

## 2017-03-07 PROCEDURE — 20600001 HC STEP DOWN PRIVATE ROOM

## 2017-03-07 PROCEDURE — 25000003 PHARM REV CODE 250: Performed by: INTERNAL MEDICINE

## 2017-03-07 PROCEDURE — 36415 COLL VENOUS BLD VENIPUNCTURE: CPT

## 2017-03-07 PROCEDURE — 82803 BLOOD GASES ANY COMBINATION: CPT

## 2017-03-07 PROCEDURE — 99233 SBSQ HOSP IP/OBS HIGH 50: CPT | Mod: ,,, | Performed by: INTERNAL MEDICINE

## 2017-03-07 PROCEDURE — 85610 PROTHROMBIN TIME: CPT

## 2017-03-07 PROCEDURE — 27000221 HC OXYGEN, UP TO 24 HOURS

## 2017-03-07 PROCEDURE — 83050 HGB METHEMOGLOBIN QUAN: CPT

## 2017-03-07 PROCEDURE — 25000003 PHARM REV CODE 250: Performed by: PHYSICIAN ASSISTANT

## 2017-03-07 PROCEDURE — 83735 ASSAY OF MAGNESIUM: CPT

## 2017-03-07 RX ORDER — AMOXICILLIN 250 MG
2 CAPSULE ORAL 2 TIMES DAILY
Status: DISCONTINUED | OUTPATIENT
Start: 2017-03-07 | End: 2017-03-24 | Stop reason: HOSPADM

## 2017-03-07 RX ORDER — ISOSORBIDE DINITRATE 10 MG/1
10 TABLET ORAL 3 TIMES DAILY
Status: DISCONTINUED | OUTPATIENT
Start: 2017-03-07 | End: 2017-03-13

## 2017-03-07 RX ORDER — HYDRALAZINE HYDROCHLORIDE 25 MG/1
25 TABLET, FILM COATED ORAL EVERY 8 HOURS
Status: DISCONTINUED | OUTPATIENT
Start: 2017-03-07 | End: 2017-03-13

## 2017-03-07 RX ORDER — DOBUTAMINE HYDROCHLORIDE 400 MG/100ML
2.5 INJECTION INTRAVENOUS CONTINUOUS
Status: DISCONTINUED | OUTPATIENT
Start: 2017-03-07 | End: 2017-03-24 | Stop reason: HOSPADM

## 2017-03-07 RX ADMIN — ISOSORBIDE DINITRATE 10 MG: 10 TABLET ORAL at 09:03

## 2017-03-07 RX ADMIN — HYDRALAZINE HYDROCHLORIDE 25 MG: 25 TABLET, FILM COATED ORAL at 09:03

## 2017-03-07 RX ADMIN — ISOSORBIDE DINITRATE 10 MG: 10 TABLET ORAL at 02:03

## 2017-03-07 RX ADMIN — Medication 400 MG: at 09:03

## 2017-03-07 RX ADMIN — LEVETIRACETAM 750 MG: 750 TABLET, FILM COATED ORAL at 09:03

## 2017-03-07 RX ADMIN — STANDARDIZED SENNA CONCENTRATE AND DOCUSATE SODIUM 2 TABLET: 8.6; 5 TABLET, FILM COATED ORAL at 09:03

## 2017-03-07 RX ADMIN — LEVOCARNITINE 3.3 ML: 1 SOLUTION ORAL at 09:03

## 2017-03-07 RX ADMIN — PANTOPRAZOLE SODIUM 40 MG: 40 TABLET, DELAYED RELEASE ORAL at 09:03

## 2017-03-07 RX ADMIN — Medication 3 ML: at 02:03

## 2017-03-07 NOTE — PROGRESS NOTES
HTS notified resulted K 5.7 with no evidence of hemolysis.   Pt's rhythm is still different than his baseline from earlier in the day.  States to continue to monitor.

## 2017-03-07 NOTE — PLAN OF CARE
Problem: Patient Care Overview  Goal: Individualization & Mutuality  Dx: HF    Past Medical History:  No date: CHF (congestive heart failure)  No date: Hypertension  No date: Seizures    History reviewed. No pertinent surgical history.    1. Pt likes tv on sports   Outcome: Ongoing (interventions implemented as appropriate)  No acute events throughout day, Nitric being weaned per RT. Will transfer when Nitric off. Remains on Dobutamine, Lasix and Heparin infusions. VS and assessment per flow sheet, patient progressing towards goals as tolerated, plan of care reviewed with Binh Conde and family, all concerns addressed, will continue to monitor.

## 2017-03-07 NOTE — PLAN OF CARE
Problem: Patient Care Overview  Goal: Plan of Care Review  Pt AAOx4. VSS. Afebrile. 4L with 10 ppm nitric. Pt reports lack of appetite. Pt reports difficulty having BM. No BM this shift. Gtts per MAR. Rahman with good UO. POC updated with pt.

## 2017-03-07 NOTE — PROGRESS NOTES
Progress Note  HTS    Admit Date: 3/3/2017   LOS: 4 days     SUBJECTIVE:     Patient seen and examined. Denies chest pain or shortness of breath. NAEO. Outstanding UOP. IABP out, some abd pain, no BM in ~ 3 days    Scheduled Meds:   coenzyme Q10  400 mg Oral Daily    hydrALAZINE  25 mg Oral Q8H    isosorbide dinitrate  10 mg Oral TID    levetiracetam  750 mg Oral BID    levocarnitine  330 mg Oral BID    senna-docusate 8.6-50 mg  2 tablet Oral BID    sodium chloride 0.9%  3 mL Intravenous Q8H     Continuous Infusions:   DOBUTamine 5 mcg/kg/min (03/07/17 1000)    furosemide (LASIX) 5 mg/mL infusion (non-titrating) 10 mg/hr (03/07/17 1000)    heparin (porcine) in D5W 17 Units/kg/hr (03/07/17 1000)    nitric oxide gas       PRN Meds:alprazolam, heparin (PORCINE), heparin (PORCINE)    Review of patient's allergies indicates:   Allergen Reactions    Aspirin     Bactrim [sulfamethoxazole-trimethoprim]     Depakote [divalproex]     Dilantin [phenytoin sodium extended]     Lorazepam     Phenobarbital     Tegretol [carbamazepine]     Thallium-201        OBJECTIVE:     Vital Signs (Most Recent)  Temp: 99 °F (37.2 °C) (03/07/17 0701)  Pulse: 83 (03/07/17 1000)  Resp: 20 (03/07/17 1000)  BP: 101/75 (03/07/17 1000)  SpO2: 100 % (03/07/17 1000)    Vital Signs Range (Last 24H):  Temp:  [98.1 °F (36.7 °C)-99 °F (37.2 °C)]   Pulse:  []   Resp:  [13-25]   BP: ()/(59-79)   SpO2:  [89 %-100 %]     I & O (Last 24H):    Intake/Output Summary (Last 24 hours) at 03/07/17 1058  Last data filed at 03/07/17 1000   Gross per 24 hour   Intake           906.48 ml   Output             1965 ml   Net         -1058.52 ml       Physical Exam:   General: Patient in no acute distress or discomfort  HEENT: No JVD, moist mucous membranes  Cardiac: S1S2 RRR  Chest: CTABL, no wheezing or rales  Abd:Soft NTND  Ext: No Edema No swelling  Neuro: A and O X 3, non focal      LABS  CBC with Diff:     Recent Labs  Lab 03/05/17  0330  03/06/17 0344 03/07/17  0332   WBC 11.10 11.70 14.01*   HGB 14.9 15.5 15.2   HCT 43.8 46.3 45.2   PLT 77* 72* 83*   LYMPH 17.8*  2.0 6.8*  0.8* 14.3*  2.0   MONO 7.6  0.8 16.9*  2.0* 7.2  1.0   EOSINOPHIL 0.5 0.6 0.4       COAG:    Recent Labs  Lab 03/03/17  0630  03/05/17 0330 03/06/17  0344 03/07/17  0332   APTT 30.1  --   --   --   --    INR 1.7*  < > 1.2 1.1 1.1   < > = values in this interval not displayed.    CMP:     Recent Labs  Lab 03/05/17  0330 03/06/17 0344 03/06/17  1708 03/07/17 0332   * 148* 171* 152*   CALCIUM 9.4 10.1 9.9 9.7   ALBUMIN 3.4* 3.6  --  3.3*   PROT 7.2 7.6  --  7.5    137 133* 133*   K 3.9 4.8 5.7* 5.7*   CO2 37* 38* 35* 33*   CL 86* 85* 85* 85*   BUN 65* 66* 65* 65*   CREATININE 2.4* 2.4* 2.4* 2.4*   ALKPHOS 220* 220*  --  210*   * 393*  --  273*   * 104*  --  68*   BILITOT 3.8* 3.9*  --  3.6*   MG 2.3 2.5 2.3 2.3   PHOS 3.0 3.6  --  4.0     Estimated Creatinine Clearance: 31.1 mL/min (based on Cr of 2.4).    .    Recent Labs  Lab 03/03/17 0630   BNP 2508*     SvO2 73    Present on Admission:   Cardiogenic shock   MELAS (mitochondrial encephalopathy, lactic acidosis and stroke-like episodes)   Atrial flutter   NICM (nonischemic cardiomyopathy)   Seizure disorder   CKD (chronic kidney disease) stage 4, GFR 15-29 ml/min   Thrombocytopenia   Coagulopathy   Transaminitis   Hypoalbuminemia      ASSESSMENT / PLAN:   35 Y/O M gentleman with PMH of HFrEF (EF <10%) 2/2 NICM s/p AICD (implanted 12/2013), MELAS (mitochondrial encephalopathy, lactic acidosis, and stroke-like episodes), Hx of LV thrombus, Atrial Flutter, CKD, seizure disorder, who is now [place] to [facility] [place] [facility] in cardiogenic shock and consideration for heart transplant and/or advanced options. IABP now out and decreasing support    -NICM now in Cardiogenic shock:  Continue , will decrease to 2.5mcg/kg/min; wean off [place] today  SVO2 73  Diuresing well with lasix ggt 10  mg/hr  Initiated pathway for evaluation of advanced options, will proceed on Monday.  CTs to be done today  Move to floor  Start hydralazine 25mg today and isordil 10mg, both TID    Atrial FLutter   S/p RENÉE/ DCCV currnetly in sinus  C/W Heparin for anticoagualtion    MELAS  Appreciated Vasc Neurology consult, no contraindication to proceed with evaluation for advanced options  C/W home medications Kepra, L carnitine and Co enz Q    Cardiac diet with boost  Full Code    PT/OT    Further recommendations per attending addendum    Sergio Pavon MD  PGY-4 [phone])  Cardiology Fellow

## 2017-03-07 NOTE — CONSULTS
CTs pending. Will be able to complete consult with these are able to be reviewed by surgeon.    Tucker Espitia D.O.   Fellow in Cardiothoracic Surgery  PG VI  Pg [phone]  3/7/2017 2:19 PM

## 2017-03-08 LAB
ALBUMIN SERPL BCP-MCNC: 3.5 G/DL
ALLENS TEST: ABNORMAL
ALP SERPL-CCNC: 220 U/L
ALT SERPL W/O P-5'-P-CCNC: 190 U/L
ANION GAP SERPL CALC-SCNC: 15 MMOL/L
ANISOCYTOSIS BLD QL SMEAR: SLIGHT
AST SERPL-CCNC: 57 U/L
BASOPHILS # BLD AUTO: 0.03 K/UL
BASOPHILS NFR BLD: 0.2 %
BILIRUB SERPL-MCNC: 3.2 MG/DL
BUN SERPL-MCNC: 73 MG/DL
CALCIUM SERPL-MCNC: 10.2 MG/DL
CHLORIDE SERPL-SCNC: 83 MMOL/L
CO2 SERPL-SCNC: 32 MMOL/L
CREAT SERPL-MCNC: 2.6 MG/DL
DELSYS: ABNORMAL
DIFFERENTIAL METHOD: ABNORMAL
EOSINOPHIL # BLD AUTO: 0.1 K/UL
EOSINOPHIL NFR BLD: 0.5 %
ERYTHROCYTE [DISTWIDTH] IN BLOOD BY AUTOMATED COUNT: 14.9 %
EST. GFR  (AFRICAN AMERICAN): 35.1 ML/MIN/1.73 M^2
EST. GFR  (NON AFRICAN AMERICAN): 30.4 ML/MIN/1.73 M^2
FACT X PPP CHRO-ACNC: 0.4 IU/ML
FACT X PPP CHRO-ACNC: 0.84 IU/ML
FLOW: 2
GLUCOSE SERPL-MCNC: 156 MG/DL
HCO3 UR-SCNC: 34.2 MMOL/L (ref 24–28)
HCT VFR BLD AUTO: 45.5 %
HGB BLD-MCNC: 15.5 G/DL
HYPOCHROMIA BLD QL SMEAR: ABNORMAL
INR PPP: 1.1
LYMPHOCYTES # BLD AUTO: 1.4 K/UL
LYMPHOCYTES NFR BLD: 9.5 %
MAGNESIUM SERPL-MCNC: 2.5 MG/DL
MCH RBC QN AUTO: 30.5 PG
MCHC RBC AUTO-ENTMCNC: 34.1 %
MCV RBC AUTO: 89 FL
MODE: ABNORMAL
MONOCYTES # BLD AUTO: 1.8 K/UL
MONOCYTES NFR BLD: 12.3 %
NEUTROPHILS # BLD AUTO: 11.1 K/UL
NEUTROPHILS NFR BLD: 77.5 %
OVALOCYTES BLD QL SMEAR: ABNORMAL
PCO2 BLDA: 49.7 MMHG (ref 35–45)
PH SMN: 7.45 [PH] (ref 7.35–7.45)
PHOSPHATE SERPL-MCNC: 4.7 MG/DL
PLATELET # BLD AUTO: 132 K/UL
PMV BLD AUTO: ABNORMAL FL
PO2 BLDA: 33 MMHG (ref 40–60)
POC BE: 10 MMOL/L
POC SATURATED O2: 65 % (ref 95–100)
POC TCO2: 36 MMOL/L (ref 24–29)
POIKILOCYTOSIS BLD QL SMEAR: SLIGHT
POLYCHROMASIA BLD QL SMEAR: ABNORMAL
POTASSIUM SERPL-SCNC: 5.1 MMOL/L
PROT SERPL-MCNC: 8.1 G/DL
PROTHROMBIN TIME: 11.5 SEC
RBC # BLD AUTO: 5.09 M/UL
SAMPLE: ABNORMAL
SITE: ABNORMAL
SODIUM SERPL-SCNC: 130 MMOL/L
WBC # BLD AUTO: 14.53 K/UL

## 2017-03-08 PROCEDURE — 80053 COMPREHEN METABOLIC PANEL: CPT

## 2017-03-08 PROCEDURE — 25000003 PHARM REV CODE 250: Performed by: INTERNAL MEDICINE

## 2017-03-08 PROCEDURE — 85610 PROTHROMBIN TIME: CPT

## 2017-03-08 PROCEDURE — 83735 ASSAY OF MAGNESIUM: CPT

## 2017-03-08 PROCEDURE — 83050 HGB METHEMOGLOBIN QUAN: CPT

## 2017-03-08 PROCEDURE — 97161 PT EVAL LOW COMPLEX 20 MIN: CPT

## 2017-03-08 PROCEDURE — 25000003 PHARM REV CODE 250: Performed by: PHYSICIAN ASSISTANT

## 2017-03-08 PROCEDURE — 20600001 HC STEP DOWN PRIVATE ROOM

## 2017-03-08 PROCEDURE — 85025 COMPLETE CBC W/AUTO DIFF WBC: CPT

## 2017-03-08 PROCEDURE — 99233 SBSQ HOSP IP/OBS HIGH 50: CPT | Mod: ,,, | Performed by: INTERNAL MEDICINE

## 2017-03-08 PROCEDURE — 85520 HEPARIN ASSAY: CPT | Mod: 91

## 2017-03-08 PROCEDURE — 85520 HEPARIN ASSAY: CPT

## 2017-03-08 PROCEDURE — 99900035 HC TECH TIME PER 15 MIN (STAT)

## 2017-03-08 PROCEDURE — 82803 BLOOD GASES ANY COMBINATION: CPT

## 2017-03-08 PROCEDURE — 84100 ASSAY OF PHOSPHORUS: CPT

## 2017-03-08 PROCEDURE — 99232 SBSQ HOSP IP/OBS MODERATE 35: CPT | Mod: ,,, | Performed by: INTERNAL MEDICINE

## 2017-03-08 RX ORDER — WARFARIN SODIUM 5 MG/1
5 TABLET ORAL DAILY
Status: DISCONTINUED | OUTPATIENT
Start: 2017-03-08 | End: 2017-03-10

## 2017-03-08 RX ADMIN — Medication 3 ML: at 02:03

## 2017-03-08 RX ADMIN — Medication 400 MG: at 10:03

## 2017-03-08 RX ADMIN — HYDRALAZINE HYDROCHLORIDE 25 MG: 25 TABLET, FILM COATED ORAL at 06:03

## 2017-03-08 RX ADMIN — STANDARDIZED SENNA CONCENTRATE AND DOCUSATE SODIUM 2 TABLET: 8.6; 5 TABLET, FILM COATED ORAL at 09:03

## 2017-03-08 RX ADMIN — HEPARIN SODIUM AND DEXTROSE 14 UNITS/KG/HR: 10000; 5 INJECTION INTRAVENOUS at 12:03

## 2017-03-08 RX ADMIN — HYDRALAZINE HYDROCHLORIDE 25 MG: 25 TABLET, FILM COATED ORAL at 04:03

## 2017-03-08 RX ADMIN — LEVETIRACETAM 750 MG: 750 TABLET, FILM COATED ORAL at 09:03

## 2017-03-08 RX ADMIN — HYDRALAZINE HYDROCHLORIDE 25 MG: 25 TABLET, FILM COATED ORAL at 10:03

## 2017-03-08 RX ADMIN — HEPARIN SODIUM AND DEXTROSE 14 UNITS/KG/HR: 10000; 5 INJECTION INTRAVENOUS at 10:03

## 2017-03-08 RX ADMIN — ISOSORBIDE DINITRATE 10 MG: 10 TABLET ORAL at 09:03

## 2017-03-08 RX ADMIN — ISOSORBIDE DINITRATE 10 MG: 10 TABLET ORAL at 04:03

## 2017-03-08 RX ADMIN — ISOSORBIDE DINITRATE 10 MG: 10 TABLET ORAL at 06:03

## 2017-03-08 RX ADMIN — SODIUM CHLORIDE 250 ML: 0.9 INJECTION, SOLUTION INTRAVENOUS at 03:03

## 2017-03-08 RX ADMIN — LEVOCARNITINE 3.3 ML: 1 SOLUTION ORAL at 10:03

## 2017-03-08 RX ADMIN — WARFARIN SODIUM 5 MG: 5 TABLET ORAL at 04:03

## 2017-03-08 RX ADMIN — LEVETIRACETAM 750 MG: 750 TABLET, FILM COATED ORAL at 10:03

## 2017-03-08 NOTE — PROGRESS NOTES
Progress Note  HTS    Admit Date: 3/3/2017   LOS: 5 days     SUBJECTIVE:     Patient seen and examined. Denies chest pain or shortness of breath. NAEO. No complaints this AM. Ambulation and increased PO intake encouraged    Scheduled Meds:   coenzyme Q10  400 mg Oral Daily    hydrALAZINE  25 mg Oral Q8H    isosorbide dinitrate  10 mg Oral TID    levetiracetam  750 mg Oral BID    levocarnitine  330 mg Oral BID    senna-docusate 8.6-50 mg  2 tablet Oral BID    sodium chloride 0.9%  250 mL Intravenous Once    sodium chloride 0.9%  3 mL Intravenous Q8H    warfarin  5 mg Oral Daily     Continuous Infusions:   DOBUTamine 2.5 mcg/kg/min (03/07/17 1915)    heparin (porcine) in D5W 14 Units/kg/hr (03/08/17 1244)     PRN Meds:alprazolam, heparin (PORCINE), heparin (PORCINE)    Review of patient's allergies indicates:   Allergen Reactions    Aspirin     Bactrim [sulfamethoxazole-trimethoprim]     Depakote [divalproex]     Dilantin [phenytoin sodium extended]     Lorazepam     Phenobarbital     Tegretol [carbamazepine]     Thallium-201        OBJECTIVE:     Vital Signs (Most Recent)  Temp: 97.8 °F (36.6 °C) (03/08/17 1200)  Pulse: 84 (03/08/17 1300)  Resp: 16 (03/08/17 1200)  BP: 104/73 (03/08/17 1200)  SpO2: 100 % (03/08/17 1200)    Vital Signs Range (Last 24H):  Temp:  [97.7 °F (36.5 °C)-99 °F (37.2 °C)]   Pulse:  []   Resp:  [14-29]   BP: ()/(61-93)   SpO2:  [90 %-100 %]     I & O (Last 24H):    Intake/Output Summary (Last 24 hours) at 03/08/17 1404  Last data filed at 03/08/17 1200   Gross per 24 hour   Intake            369.2 ml   Output             1415 ml   Net          -1045.8 ml       Physical Exam:   General: Patient in no acute distress or discomfort  HEENT: No JVD, moist mucous membranes  Cardiac: S1S2 RRR  Chest: CTABL, no wheezing or rales  Abd:Soft NTND  Ext: No Edema No swelling  Neuro: A and O X 3, non focal    CVP 1      LABS  CBC with Diff:     Recent Labs  Lab 03/06/17  5161  03/07/17  0332 03/08/17  0510   WBC 11.70 14.01* 14.53*   HGB 15.5 15.2 15.5   HCT 46.3 45.2 45.5   PLT 72* 83* 132*   LYMPH 6.8*  0.8* 14.3*  2.0 9.5*  1.4   MONO 16.9*  2.0* 7.2  1.0 12.3  1.8*   EOSINOPHIL 0.6 0.4 0.5       COAG:    Recent Labs  Lab 03/03/17  0630  03/06/17  0344 03/07/17  0332 03/08/17  0510   APTT 30.1  --   --   --   --    INR 1.7*  < > 1.1 1.1 1.1   < > = values in this interval not displayed.    CMP:     Recent Labs  Lab 03/06/17  0344 03/06/17  1708 03/07/17  0332 03/08/17  0510   * 171* 152* 156*   CALCIUM 10.1 9.9 9.7 10.2   ALBUMIN 3.6  --  3.3* 3.5   PROT 7.6  --  7.5 8.1    133* 133* 130*   K 4.8 5.7* 5.7* 5.1   CO2 38* 35* 33* 32*   CL 85* 85* 85* 83*   BUN 66* 65* 65* 73*   CREATININE 2.4* 2.4* 2.4* 2.6*   ALKPHOS 220*  --  210* 220*   *  --  273* 190*   *  --  68* 57*   BILITOT 3.9*  --  3.6* 3.2*   MG 2.5 2.3 2.3 2.5   PHOS 3.6  --  4.0 4.7*     Estimated Creatinine Clearance: 28.7 mL/min (based on Cr of 2.6).    .    Recent Labs  Lab 03/03/17  0630   BNP 2508*       Present on Admission:   (Resolved) Cardiogenic shock   MELAS (mitochondrial encephalopathy, lactic acidosis and stroke-like episodes)   Atrial flutter   NICM (nonischemic cardiomyopathy)   Seizure disorder   CKD (chronic kidney disease) stage 4, GFR 15-29 ml/min   Thrombocytopenia   Coagulopathy   Transaminitis   Hypoalbuminemia      ASSESSMENT / PLAN:   37 Y/O M gentleman with PMH of HFrEF (EF <10%) 2/2 NICM s/p AICD (implanted 12/2013), MELAS (mitochondrial encephalopathy, lactic acidosis, and stroke-like episodes), Hx of LV thrombus, Atrial Flutter, CKD, seizure disorder, who is now transferred to [facility] from [facility] in cardiogenic shock and consideration for heart transplant and/or advanced options. IABP now out and decreasing support    -NICM now in Cardiogenic shock:  Continue  at 2.5mcg/kg/min  CVP 1; hold lasix today, 250cc bolus and aim to restart with orals  tomorrow  Initiated pathway for evaluation of advanced options, will proceed  CTs to be done today  Hydralazine 25mg and isordil 10mg, both TID    Atrial FLutter   S/p RENÉE/ DCCV currnetly in sinus  C/W Heparin for anticoagualtion  Start warfarin 5mg    MELAS  Appreciated Vasc Neurology consult, no contraindication to proceed with evaluation for advanced options  C/W home medications Kepra, L carnitine and Co enz Q    Cardiac diet with boost  Full Code  PT/OT    Further recommendations per attending addendum    Sergio Pavon MD  PGY-[phone])  Cardiology Fellow

## 2017-03-08 NOTE — PROGRESS NOTES
Electrophysiology Progress Note  Attending Physician: Landon Galeano MD  Hospital Day: 6    Subjective:   Interval History: Patient seen and examined, no events overnight, Tele showing sinus tachycardia , EPI off since 3/5/2017 , DBT down to 2.5 and off IABP since 3/6/2017  Tele showing NSR in 70's     Medications:   Continuous Infusions:   DOBUTamine 2.5 mcg/kg/min (03/07/17 1915)    heparin (porcine) in D5W 14 Units/kg/hr (03/08/17 1244)       Scheduled Meds:   coenzyme Q10  400 mg Oral Daily    hydrALAZINE  25 mg Oral Q8H    isosorbide dinitrate  10 mg Oral TID    levetiracetam  750 mg Oral BID    levocarnitine  330 mg Oral BID    senna-docusate 8.6-50 mg  2 tablet Oral BID    sodium chloride 0.9%  3 mL Intravenous Q8H    warfarin  5 mg Oral Daily     PRN Meds:alprazolam, heparin (PORCINE), heparin (PORCINE)  Objective:     Vitals:  Temp:  [97.7 °F (36.5 °C)-98.7 °F (37.1 °C)]   Pulse:  []   Resp:  [14-29]   BP: ()/(61-93)   SpO2:  [90 %-100 %]  I/O's:    Intake/Output Summary (Last 24 hours) at 03/08/17 1731  Last data filed at 03/08/17 1400   Gross per 24 hour   Intake            597.3 ml   Output             1270 ml   Net           -672.7 ml        Constitutional: NAD, conversant  HEENT: Sclera anicteric, PERRLA, EOMI  Neck: Unable to visualize JVD, no carotid bruits  CV: RRR, no murmur, normal S1/S2  Pulm: CTAB, no wheezes, rales, or ronchi  GI: Abdomen soft, NTND, +BS  Extremities: No LE edema, warm and well perfused  Skin: No ecchymosis, erythema, or ulcers  Psych: AOx3, appropriate affect  Neuro: CNII-XII intact, no focal deficits    Labs:       Recent Labs  Lab 03/06/17  1708 03/07/17  0332 03/08/17  0510   * 133* 130*   K 5.7* 5.7* 5.1   CL 85* 85* 83*   CO2 35* 33* 32*   BUN 65* 65* 73*   CREATININE 2.4* 2.4* 2.6*   * 152* 156*   ANIONGAP 13 15 15       Recent Labs  Lab 03/06/17  0344 03/07/17  0332 03/08/17  0510   * 68* 57*   * 273* 190*   ALKPHOS  220* 210* 220*   BILITOT 3.9* 3.6* 3.2*   ALBUMIN 3.6 3.3* 3.5        Recent Labs  Lab 03/06/17  0344 03/07/17  0332 03/08/17  0510   WBC 11.70 14.01* 14.53*   HGB 15.5 15.2 15.5   HCT 46.3 45.2 45.5   PLT 72* 83* 132*   GRAN 75.1*  8.8* 77.3*  10.8* 77.5*  11.1*       Recent Labs  Lab 03/06/17  0344 03/07/17  0332 03/08/17  0510   INR 1.1 1.1 1.1       Recent Labs  Lab 03/03/17  0630   BNP 2508*      Micro:   Blood Cultures  No results found for: LABBLOO  Urine Cultures  No results found for: LABURIN    Imaging:     EF   Date Value Ref Range Status   03/03/2017 10 (A) 55 - 65    03/03/2017 10 (A) 55 - 65             Assessment&Plan:   36 y.o. gentleman with PMH of HFrEF (EF <10%) 2/2 NICM s/p ICD (implanted 12/2013), MELAS (mitochondrial encephalopathy, lactic acidosis, and stroke-like episodes), Hx of LV thrombus,CKD, seizure disorder, who is now transferred to [facility] from [facility] in cardiogenic shock and consideration for heart transplant and/or advanced options.EP consulted for typical Aflutter with 2:1 block:     -S/P successful RENÉE+DCCV on 3/3/2017  -Continue on anticoagulation   -ICD interrogated and he has Med[employer] single lead ICD , interrogation was reviewed with Dr Reyez looking closely looks like he has HR around 120 for about 2 weeks and that is possibly AFlutter  -Will need CTI RFA for typical AFlutter outpatient  -Follow up with EP clinic in 4 weeks        Lazaro Harden MD  Cardiology Fellow  Pager: [phone]

## 2017-03-08 NOTE — PROGRESS NOTES
Electrophysiology Progress Note  Attending Physician: Landon Galeano MD  Hospital Day: 5    Subjective:   Interval History: Patient seen and examined, no events overnight, Tele showing sinus tachycardia , EPI off since 3/5/2017 , DBT 5 and off IABP since 3/6/2017  Tele showing NSR in 70's     Medications:   Continuous Infusions:   DOBUTamine 5 mcg/kg/min (03/07/17 1800)    furosemide (LASIX) 5 mg/mL infusion (non-titrating) 10 mg/hr (03/07/17 1800)    heparin (porcine) in D5W 16.025 Units/kg/hr (03/07/17 1800)    nitric oxide gas         Scheduled Meds:   coenzyme Q10  400 mg Oral Daily    hydrALAZINE  25 mg Oral Q8H    isosorbide dinitrate  10 mg Oral TID    levetiracetam  750 mg Oral BID    levocarnitine  330 mg Oral BID    senna-docusate 8.6-50 mg  2 tablet Oral BID    sodium chloride 0.9%  3 mL Intravenous Q8H     PRN Meds:alprazolam, heparin (PORCINE), heparin (PORCINE)  Objective:     Vitals:  Temp:  [98.1 °F (36.7 °C)-99.1 °F (37.3 °C)]   Pulse:  []   Resp:  [14-36]   BP: ()/(59-76)   SpO2:  [89 %-100 %]  I/O's:    Intake/Output Summary (Last 24 hours) at 03/07/17 1844  Last data filed at 03/07/17 1800   Gross per 24 hour   Intake            932.3 ml   Output             1960 ml   Net          -1027.7 ml        Constitutional: NAD, conversant  HEENT: Sclera anicteric, PERRLA, EOMI  Neck: Unable to visualize JVD, no carotid bruits  CV: RRR, no murmur, normal S1/S2  Pulm: CTAB, no wheezes, rales, or ronchi  GI: Abdomen soft, NTND, +BS  Extremities: No LE edema, warm and well perfused  Skin: No ecchymosis, erythema, or ulcers  Psych: AOx3, appropriate affect  Neuro: CNII-XII intact, no focal deficits    Labs:       Recent Labs  Lab 03/06/17  0344 03/06/17  1708 03/07/17  0332    133* 133*   K 4.8 5.7* 5.7*   CL 85* 85* 85*   CO2 38* 35* 33*   BUN 66* 65* 65*   CREATININE 2.4* 2.4* 2.4*   * 171* 152*   ANIONGAP 14 13 15       Recent Labs  Lab 03/05/17  0330 03/06/17  0344  03/07/17  0332   * 104* 68*   * 393* 273*   ALKPHOS 220* 220* 210*   BILITOT 3.8* 3.9* 3.6*   ALBUMIN 3.4* 3.6 3.3*        Recent Labs  Lab 03/05/17  0330 03/06/17  0344 03/07/17  0332   WBC 11.10 11.70 14.01*   HGB 14.9 15.5 15.2   HCT 43.8 46.3 45.2   PLT 77* 72* 83*   GRAN 73.6*  8.2* 75.1*  8.8* 77.3*  10.8*       Recent Labs  Lab 03/05/17  0330 03/06/17  0344 03/07/17  0332   INR 1.2 1.1 1.1       Recent Labs  Lab 03/03/17  0630   BNP 2508*      Micro:   Blood Cultures  No results found for: LABBLOO  Urine Cultures  No results found for: LABURIN    Imaging:     EF   Date Value Ref Range Status   03/03/2017 10 (A) 55 - 65    03/03/2017 10 (A) 55 - 65             Assessment&Plan:   36 y.o. gentleman with PMH of HFrEF (EF <10%) 2/2 NICM s/p ICD (implanted 12/2013), MELAS (mitochondrial encephalopathy, lactic acidosis, and stroke-like episodes), Hx of LV thrombus,CKD, seizure disorder, who [place] now [place] [facility] [place] [facility] in cardiogenic shock and consideration for heart transplant and/or advanced options.EP consulted for typical Aflutter with 2:1 block:     -S/P successful RENÉE+DCCV on 3/3/2017  -Continue on anticoagulation   -ICD interrogated and he has Med[employer] single lead ICD , interrogation was reviewed with Dr Reyez looking closely looks like he has HR around 120 for about 2 weeks and that is possibly AFlutter  -Will need CTI RFA for typical AFlutter when more stable   -EP will follow       Lazaro Harden MD  Cardiology Fellow  Pager: [phone]

## 2017-03-08 NOTE — PLAN OF CARE
Problem: Patient Care Overview  Goal: Plan of Care Review  Outcome: Ongoing (interventions implemented as appropriate)  Pt free of falls and injury this shift. POC reviewed with pt at bedside, pt verbalized understanding. Dobutamine, Heparin, Lasix and NS all infusing as ordered, see MAR. Pt denies pain at this time, voices no concern. Educated pt about fall risk status, d/t weakness, verbalized understanding; yellow fall risk band on wrist, yellow socks on feet. VSS, NAD noted at this time. Will continue to monitor.

## 2017-03-08 NOTE — PLAN OF CARE
Pathway Step 1:  Provider has discussed consideration of evaluation for advanced cardiac therapy options to include Heart Transplant and Mechanical Circulatory Support.  Ordered CT Scans: Head, Chest, Abd, Pelvis obtained and results are available for review.  Cardiac Echocardiogram is complete

## 2017-03-08 NOTE — PT/OT/SLP EVAL
Physical Therapy  Evaluation    Binh Conde   MRN: [id number]   Admitting Diagnosis: Cardiogenic shock    PT Received On: 17  PT Start Time: 1021     PT Stop Time: 1037    PT Total Time (min): 16 min       Billable Minutes:  Evaluation 16    Diagnosis: Cardiogenic shock  LVAD vs. OHT w/u  Cardioversion 3/3   IABP placed 3/2 at [facility], removed 3/6    Past Medical History:   Diagnosis Date    CHF (congestive heart failure)     Hypertension     Seizures       History reviewed. No pertinent surgical history.    Referring physician: Sergio Pavon MD  Date referred to PT: 3/3/17    General Precautions: Standard, fall  Orthopedic Precautions: N/A   Braces: N/A       Do you have any cultural, spiritual, Spiritism conflicts, given your current situation?: none noted     Patient History:  Lives With: alone  Living Arrangements: apartment  Home Layout: Able to live on 1st floor  Transportation Available: family or friend will provide  Living Environment Comment: Pt reports that he lives alone in a 1st floor apt with 0 SUSY. Pt reports that he was (I) with ADLs and ambulation PTA without use of DME. Pt reports that he does not have assist available upon d/c. However, need to assess this further, as this is conflicting information from OT note. May need further clarification of pt history as pt is a questionable historian.   Equipment Currently Used at Home: none  DME owned (not currently used): none    Previous Level of Function:  Ambulation Skills: independent  Transfer Skills: independent  ADL Skills: independent  Work/Leisure Activity: independent    Subjective:  Communicated with RN prior to session.  Pt agreeable to therapy.   Chief Complaint: none noted   Patient goals: return home     Pain Ratin/10     Objective:   Patient found with: pulse ox (continuous), telemetry, central line, oxygen     Cognitive Exam:  Slowed responses and delayed reaction times, requiring increased time and cueing; occasional  inappropriate responses to questions and commands    Follows Commands/attention: Inattentive, Easily distracted and Follows one-step commands  Communication: clear/fluent  Safety awareness/insight to disability: impaired    Physical Exam:  Postural examination/scapula alignment: Rounded shoulder and Head forward    Skin integrity: Visible skin intact  Edema: None noted      Sensation:   Intact    Lower Extremity Range of Motion:  Right Lower Extremity: WFL  Left Lower Extremity: WFL    Lower Extremity Strength:  Right Lower Extremity: WFL  Left Lower Extremity: WFL    Functional Mobility:  Bed Mobility:  Supine to Sit: Contact Guard Assistance (with HOB elevated; required increased time and cueing)    Transfers:  Sit <> Stand Assistance: Contact Guard Assistance  Sit <> Stand Assistive Device: No Assistive Device    Gait:   Gait Distance: 12 ft. to transport stretcher  Assistance 1: Contact Guard Assistance  Gait Assistive Device: Hand held assist  Gait Pattern: 2-point gait  Gait Deviation(s): decreased benedict, increased time in double stance, decreased velocity of limb motion, decreased weight-shifting ability, decreased step length     Balance:   Static Sit: GOOD: Takes MODERATE challenges from all directions  Dynamic Sit: GOOD: Maintains balance through MODERATE excursions of active trunk movement  Static Stand: GOOD-: Takes MODERATE challenges from all directions inconsistently  Dynamic stand: FAIR: Needs CONTACT GUARD during gait    Therapeutic Activities and Exercises:  PT eval complete but limited due to arrival of transport. Need further assessment of safety and gait to make thorough d/c recs.   Pt educated on role of PT and PT POC. Pt verbalized understanding.     AM-PAC 6 CLICK MOBILITY  How much help from another person does this patient currently need?   1 = Unable, Total/Dependent Assistance  2 = A lot, Maximum/Moderate Assistance  3 = A little, Minimum/Contact Guard/Supervision  4 = None, Modified  [place]/Independent    Turning over in bed (including adjusting bedclothes, sheets and blankets)?: 3  Sitting down on and standing up from a chair with arms (e.g., wheelchair, bedside commode, etc.): 3  Moving from lying on back to sitting on the side of the bed?: 3  Moving to and from a bed to a chair (including a wheelchair)?: 3  Need to walk in hospital room?: 3  Climbing 3-5 steps with a railing?: 3  Total Score: 18     AM-PAC Raw Score CMS G-Code Modifier Level of Impairment Assistance   6 % Total / Unable   7 - 9 CM 80 - 100% Maximal Assist   10 - 14 CL 60 - 80% Moderate Assist   15 - 19 CK 40 - 60% Moderate Assist   20 - 22 CJ 20 - 40% Minimal Assist   23 CI 1-20% SBA / CGA   24 CH 0% Independent/ Mod I     Patient left supine on transport stretcher with all lines intact, call button in reach and transport and RN present.    Assessment:   Binh Conde is a 36 y.o. male with a medical diagnosis of Cardiogenic shock and presents s/p IABP removal 3/6. Pt completed therapy evaluation but with limited assessment of gait due to arrival of transport. During eval, pt demonstrated some signs of cognitive impairment including, slowed reactions and inappropriate responses to questions/commands requiring increased time and cueing to complete therapy evaluation. Pt also required CGA for functional mobility 2* decreased balance and impaired safety awareness.  Pt would benefit from skilled IP PT in order to address current deficits and progress functional mobility. Needs further assessment of safety and gait to make appropriate d/c recs.     Rehab identified problem list/impairments: Rehab identified problem list/impairments: weakness, impaired self care skills, impaired balance, impaired functional mobilty, impaired endurance, gait instability, impaired cognition, impaired cardiopulmonary response to activity, decreased safety awareness    Rehab potential is good.    Activity tolerance: Good    Discharge  recommendations: Discharge Facility/Level Of Care Needs: home with home health (also needs family assist; further recs pending safety and gait assessment, as eval was limited by arrival of transport)     Barriers to discharge: Barriers to Discharge: Decreased caregiver support (lives alone with no assist upon d/c )    Equipment recommendations: Equipment Needed After Discharge:  (TBD)     GOALS:   Physical Therapy Goals        Problem: Physical Therapy Goal    Goal Priority Disciplines Outcome Goal Variances Interventions   Physical Therapy Goal     PT/OT, PT Ongoing (interventions implemented as appropriate)     Description:  Goals to be met by: 3/18/17     Patient will increase functional independence with mobility by performin. Supine to sit with [place]  2. Sit to stand transfer with [place]  3. Bed to chair transfer with Supervision  4. Gait  x 200 feet with Supervision.   5. Lower extremity exercise program x15 reps, with assistance as needed, in order to increase LE strength and (I) with functional mobility.                 PLAN:    Patient to be seen 4 x/week to address the above listed problems via gait training, therapeutic activities, therapeutic exercises  Plan of Care expires: 17  Plan of Care reviewed with: patient        Mary Praveen, PT, DPT   3/8/2017  [phone]

## 2017-03-08 NOTE — PLAN OF CARE
Problem: Physical Therapy Goal  Goal: Physical Therapy Goal  Goals to be met by: 3/18/17     Patient will increase functional independence with mobility by performin. Supine to sit with [place]  2. Sit to stand transfer with [place]  3. Bed to chair transfer with Supervision  4. Gait x 200 feet with Supervision.   5. Lower extremity exercise program x15 reps, with assistance as needed, in order to increase LE strength and (I) with functional mobility.   Outcome: Ongoing (interventions implemented as appropriate)  PT evaluation complete and appropriate goals established.     Mary Morton, PT, DPT   3/8/2017  [phone]

## 2017-03-08 NOTE — NURSING TRANSFER
Nursing Transfer Note      3/8/2017     Transfer To: CT    Transfer via stretcher    Transfer with cardiac monitoring, DOBUTamine, Furosemide and Heparin    Transported by [employer]    Medicines sent: No    Chart send with patient: No

## 2017-03-08 NOTE — NURSING TRANSFER
Nursing Transfer Note      3/7/2017     Transfer from CM[facility] to     Transfer via bed    Transfer with cardiac monitoring, IV medications infusing    Transported by RN, PCT    Medicines sent: no    Chart send with patient: yes    Notified: yes    Patient reassessed at: 2100    Upon arrival to floor: cardiac monitoring applied, oxygen applied. Oriented pt to room and call light, bed locked in lowest position. VISI monitoring applied, VSS, NAD noted. Will continue to monitor.

## 2017-03-08 NOTE — PLAN OF CARE
Problem: Patient Care Overview  Goal: Plan of Care Review  Outcome: Ongoing (interventions implemented as appropriate)  Reviewed plan of care with patient.  Patient will be continuously monitored by telemetry and VISI.  FITBIT and IPAD will be issued during admit and returned at discharge.  Labs will be drawn and results will be monitored.  Venous Blood Gas daily.  CVP monitoring.  DOBUTamine, Furosemide, and Heparin infusing at ordered rate.  Today's procedures/assessments/consults: LVAD/Heart Transplant workup: CT of chest, Head,and Pelvis .  Patient will report:  Bleeding, SOB, pain, dizziness, and swelling.  Patient will immediately report:  inflammation, leaking, bruising or dislodged PIV.  Patient will remain free of fall/trauma/injury by using appropriate lighting, nonskid socks, and by keeping area free of debris.  Patient will call for assistance when needed. Patient does not seem to understand all instructions.

## 2017-03-08 NOTE — NURSING
Changed PICC dressing to right IJ.  Site is clean, dry, and intact.  Biopatch is in place.  NaCl+ infusing to cordis.

## 2017-03-09 PROBLEM — R57.0 CARDIOGENIC SHOCK: Status: ACTIVE | Noted: 2017-03-09

## 2017-03-09 PROBLEM — G81.04 FLACCID HEMIPLEGIA AFFECTING LEFT NONDOMINANT SIDE: Status: ACTIVE | Noted: 2017-03-09

## 2017-03-09 LAB
ALBUMIN SERPL BCP-MCNC: 3.2 G/DL
ALLENS TEST: ABNORMAL
ALP SERPL-CCNC: 193 U/L
ALT SERPL W/O P-5'-P-CCNC: 128 U/L
ANION GAP SERPL CALC-SCNC: 15 MMOL/L
AST SERPL-CCNC: 51 U/L
BASOPHILS # BLD AUTO: 0.01 K/UL
BASOPHILS NFR BLD: 0.1 %
BILIRUB SERPL-MCNC: 2.8 MG/DL
BUN SERPL-MCNC: 71 MG/DL
CALCIUM SERPL-MCNC: 9.5 MG/DL
CHLORIDE SERPL-SCNC: 89 MMOL/L
CO2 SERPL-SCNC: 27 MMOL/L
CREAT SERPL-MCNC: 2.4 MG/DL
DELSYS: ABNORMAL
DIFFERENTIAL METHOD: ABNORMAL
EOSINOPHIL # BLD AUTO: 0.1 K/UL
EOSINOPHIL NFR BLD: 0.8 %
ERYTHROCYTE [DISTWIDTH] IN BLOOD BY AUTOMATED COUNT: 15 %
EST. GFR  (AFRICAN AMERICAN): 38.7 ML/MIN/1.73 M^2
EST. GFR  (NON AFRICAN AMERICAN): 33.4 ML/MIN/1.73 M^2
FACT X PPP CHRO-ACNC: 0.48 IU/ML
GLUCOSE SERPL-MCNC: 137 MG/DL
HCO3 UR-SCNC: 29.3 MMOL/L (ref 24–28)
HCT VFR BLD AUTO: 41.6 %
HGB BLD-MCNC: 14.1 G/DL
INR PPP: 1.1
LYMPHOCYTES # BLD AUTO: 1.7 K/UL
LYMPHOCYTES NFR BLD: 14.9 %
MAGNESIUM SERPL-MCNC: 2.3 MG/DL
MCH RBC QN AUTO: 30.2 PG
MCHC RBC AUTO-ENTMCNC: 33.9 %
MCV RBC AUTO: 89 FL
MODE: ABNORMAL
MONOCYTES # BLD AUTO: 1.5 K/UL
MONOCYTES NFR BLD: 13.1 %
NEUTROPHILS # BLD AUTO: 7.9 K/UL
NEUTROPHILS NFR BLD: 70 %
PCO2 BLDA: 41.9 MMHG (ref 35–45)
PH SMN: 7.45 [PH] (ref 7.35–7.45)
PHOSPHATE SERPL-MCNC: 4.4 MG/DL
PLATELET # BLD AUTO: 148 K/UL
PMV BLD AUTO: 12.2 FL
PO2 BLDA: 28 MMHG (ref 40–60)
POC BE: 5 MMOL/L
POC SATURATED O2: 55 % (ref 95–100)
POC TCO2: 31 MMOL/L (ref 24–29)
POCT GLUCOSE: 171 MG/DL (ref 70–110)
POTASSIUM SERPL-SCNC: 5 MMOL/L
PROT SERPL-MCNC: 7.3 G/DL
PROTHROMBIN TIME: 11.2 SEC
RBC # BLD AUTO: 4.67 M/UL
SAMPLE: ABNORMAL
SITE: ABNORMAL
SODIUM SERPL-SCNC: 131 MMOL/L
WBC # BLD AUTO: 11.32 K/UL

## 2017-03-09 PROCEDURE — 99233 SBSQ HOSP IP/OBS HIGH 50: CPT | Mod: ,,, | Performed by: INTERNAL MEDICINE

## 2017-03-09 PROCEDURE — 85610 PROTHROMBIN TIME: CPT

## 2017-03-09 PROCEDURE — 95957 EEG DIGITAL ANALYSIS: CPT

## 2017-03-09 PROCEDURE — 95951 PR EEG MONITORING/VIDEORECORD: CPT | Mod: 26,,, | Performed by: PSYCHIATRY & NEUROLOGY

## 2017-03-09 PROCEDURE — 25000003 PHARM REV CODE 250: Performed by: INTERNAL MEDICINE

## 2017-03-09 PROCEDURE — 95951 HC EEG MONITORING/VIDEO RECORD: CPT

## 2017-03-09 PROCEDURE — 93005 ELECTROCARDIOGRAM TRACING: CPT

## 2017-03-09 PROCEDURE — 96119 PR NEUROPSYCH TESTING BY TECHNICIAN: CPT | Mod: 59,S$PBB,, | Performed by: PSYCHOLOGIST

## 2017-03-09 PROCEDURE — 80177 DRUG SCRN QUAN LEVETIRACETAM: CPT

## 2017-03-09 PROCEDURE — 83735 ASSAY OF MAGNESIUM: CPT

## 2017-03-09 PROCEDURE — 99291 CRITICAL CARE FIRST HOUR: CPT | Mod: ,,, | Performed by: PSYCHIATRY & NEUROLOGY

## 2017-03-09 PROCEDURE — 94760 N-INVAS EAR/PLS OXIMETRY 1: CPT

## 2017-03-09 PROCEDURE — 27000221 HC OXYGEN, UP TO 24 HOURS

## 2017-03-09 PROCEDURE — 25000003 PHARM REV CODE 250: Performed by: PHYSICIAN ASSISTANT

## 2017-03-09 PROCEDURE — 63600175 PHARM REV CODE 636 W HCPCS: Performed by: INTERNAL MEDICINE

## 2017-03-09 PROCEDURE — 96118 PR NEUROPSYCH TESTING BY PSYCH/PHYS: CPT | Mod: S$PBB,,, | Performed by: PSYCHOLOGIST

## 2017-03-09 PROCEDURE — 85520 HEPARIN ASSAY: CPT

## 2017-03-09 PROCEDURE — 85025 COMPLETE CBC W/AUTO DIFF WBC: CPT

## 2017-03-09 PROCEDURE — 20000000 HC ICU ROOM

## 2017-03-09 PROCEDURE — 82803 BLOOD GASES ANY COMBINATION: CPT

## 2017-03-09 PROCEDURE — 80053 COMPREHEN METABOLIC PANEL: CPT

## 2017-03-09 PROCEDURE — 99900035 HC TECH TIME PER 15 MIN (STAT)

## 2017-03-09 PROCEDURE — 84100 ASSAY OF PHOSPHORUS: CPT

## 2017-03-09 RX ADMIN — SODIUM CHLORIDE 500 ML: 0.9 INJECTION, SOLUTION INTRAVENOUS at 04:03

## 2017-03-09 RX ADMIN — LEVETIRACETAM 750 MG: 750 TABLET, FILM COATED ORAL at 09:03

## 2017-03-09 RX ADMIN — WARFARIN SODIUM 5 MG: 5 TABLET ORAL at 05:03

## 2017-03-09 RX ADMIN — HEPARIN SODIUM AND DEXTROSE 14 UNITS/KG/HR: 10000; 5 INJECTION INTRAVENOUS at 06:03

## 2017-03-09 RX ADMIN — LEVOCARNITINE 3.3 ML: 1 SOLUTION ORAL at 09:03

## 2017-03-09 RX ADMIN — DOBUTAMINE IN DEXTROSE 2.5 MCG/KG/MIN: 400 INJECTION, SOLUTION INTRAVENOUS at 06:03

## 2017-03-09 RX ADMIN — HYDRALAZINE HYDROCHLORIDE 25 MG: 25 TABLET, FILM COATED ORAL at 03:03

## 2017-03-09 RX ADMIN — ISOSORBIDE DINITRATE 10 MG: 10 TABLET ORAL at 03:03

## 2017-03-09 RX ADMIN — Medication 3 ML: at 03:03

## 2017-03-09 RX ADMIN — Medication 400 MG: at 03:03

## 2017-03-09 RX ADMIN — LEVOCARNITINE 3.3 ML: 1 SOLUTION ORAL at 03:03

## 2017-03-09 RX ADMIN — LEVETIRACETAM 750 MG: 750 TABLET, FILM COATED ORAL at 03:03

## 2017-03-09 RX ADMIN — Medication 3 ML: at 10:03

## 2017-03-09 NOTE — PLAN OF CARE
Problem: Patient Care Overview  Goal: Plan of Care Review  Outcome: Ongoing (interventions implemented as appropriate)  Pt free of falls and injury this shift. POC reviewed with pt at bedside, pt verbalized understanding. Dobutamine, Heparin, and NS all infusing as ordered, see MAR. Pt denies pain at this time, voices no concern. Educated pt about fall risk status d/t weakness, verbalized understanding; yellow fall risk band on wrist, yellow socks on feet. VSS, NAD noted at this time. Will continue to monitor.

## 2017-03-09 NOTE — PSYCH TESTING
[facility] [address] [place]  [address]  [phone]    REPORT OF NEUROPSYCHOLOGICAL EVALUATION    NAME: Binh Conde  OC #: [id number]  : 1980    REFERRED BY: Landon Galeano M.D.    EVALUATED BY:  Allegra Reyes, Ph.D., Clinical Psychologist  Brando Okeefe M.S., Psychometrician    DATE OF EVALUATION: 3/8/2017    EVALUATION TIMES AND PROCEDURES:  Conducted by Psychologist:  Interpretation and report of test data  Review and integration of medical record and test data  Conducted by Technician:  Repeatable Battery for the Assessment of Neuropsychological Status (RBANS)  Temporal Orientation Test  Time: CPT Code 64554 - 1 hour; CPT Code 54814 - 1 hour    EVALUATION FINDINGS:  Mr. Binh Conde is a 36 year old right-handed -American male referred for Neuropsychological Evaluation while hospitalized at [facility].  He was referred for evaluation as part of the work-up prior to LVAD placement/heart transplant.  His medical record revealed history of LV thrombus, atrial flutter, CKD, seizure disorder, s/p ICD (implanted 2013), mitochondrial encephalopathy, lactic acidosis, and stroke-like episodes. CT of the head on 3/8/17 showed no evidence of acute intracranial pathology but there was generalized cerebral and cerebellar volume loss greater than expected for age and more focal encephalomalacia in the right parietotemporal region, likely reflecting area of previous infarction.    Neuropsychological tests administered by the technician revealed that the patient reported he is a high school graduate.  He was employed as an . He is currently disabled.  He was alert and cooperative during the evaluation.  Effort on all tests was satisfactory to produce valid results.    Mr. Conde obtained a total score of 43 on the RBANS, which is at the <0.1 percentile, suggesting severe impairment in general cognitive functioning.  Five areas were  tested.  The Immediate Memory Index revealed severe impairment in the ability to remember verbal information immediately after it is presented, with a score of 49 at the <0.1 percentile.  The Visuospatial/Constructional Index revealed severe impairment in the ability to perceive spatial relations and to construct a spatially accurate copy of a drawing, with a score of 50 at the <0.1 percentile. Visuospatial perception and constructional ability were severely impaired. The Language Index revealed severe impairment in the ability to respond verbally to either naming or retrieving learned material, with a score of 40 at the <0.1 percentile. Naming and verbal fluency were severely impaired.  Attention, or the capacity to remember and manipulate both visually and orally presented information in short-term storage, was severely impaired, with a score of 43 at the <0.1 percentile. Delayed memory, or anterograde memory capacity, was severely impaired, with a score of 44 at the <0.1 percentile. Subtest performances revealed moderately impaired story recall and severely impaired list recall, list recognition, and figure recall.  For reference, the expected average score on each index is 100, which is at the 50th percentile.    The Temporal Orientation Test indicated he was oriented to day of the week, day of the month, month, year, and time of day but he looked at a board on the wall that had the date on it.  His score on this measure suggested average temporal orientation.    SUMMARYAND RECOMMENDATIONS:  Mr. Tamez general cognitive abilities as assessed by the RBANS were in the severely impaired range, with severely impaired immediate verbal memory, visuospatial/constructional abilities, language, attention, and delayed memory.  Temporal orientation was average.  Neuropsychological test results which reveal severe impairment in immediate verbal memory, delayed memory, and attention indicate that Mr. Conde is  currently unable to reliably manage his own care independently and requires supervision from family or other caregivers to assure his medical compliance, safety, and well-being.        Interpretation and report and coding were completed on 3/9/2017.

## 2017-03-09 NOTE — NURSING
Sent a note to pharmacy at 0925 hours for levocarnitine solution 3.3 ml.  Pharmacy ([facility]) responded at 1024 hours to say it would be delivered.  Called x 2 and it was reportedly on its way.  Called at 1928 hours and spoke with Deisi.  She stated that she would have to track the bottle down because it is a medication that they do not carry routinely.  Day nurse provided contact information for night nurse to Deisi, who stated she would call back.

## 2017-03-09 NOTE — PT/OT/SLP DISCHARGE
Physical Therapy Discharge Summary    Binh Conde  MRN: [id number]   Cardiogenic shock   Patient Discharged from acute Physical Therapy on 3/9/17.  Please refer to prior PT noted date on 3/8/17 for functional status.     Assessment:   Patient was discharge unexpectedly.  Information required to complete and accurate discharge summary is unknown.  Refer to therapy initial evaluation and last progress note for initial and most recent functional status and goal achievement.  Recommendations made may be found in medical record.  GOALS:   Physical Therapy Goals        Problem: Physical Therapy Goal    Goal Priority Disciplines Outcome Goal Variances Interventions   Physical Therapy Goal     PT/OT, PT Ongoing (interventions implemented as appropriate)     Description:  Goals to be met by: 3/18/17     Patient will increase functional independence with mobility by performin. Supine to sit with [place]  2. Sit to stand transfer with [place]  3. Bed to chair transfer with Supervision  4. Gait  x 200 feet with Supervision.   5. Lower extremity exercise program x15 reps, with assistance as needed, in order to increase LE strength and (I) with functional mobility.               Reasons for Discontinuation of Therapy Services  Patient is unable to continue work toward goals because of medical or psychosocial complications.      Plan:  Patient Discharged to: ICU following stroke code.    Mary Morton, PT, DPT   3/9/2017  [phone]

## 2017-03-09 NOTE — PLAN OF CARE
Problem: Patient Care Overview  Goal: Plan of Care Review  Outcome: Ongoing (interventions implemented as appropriate)  Reviewed plan of care with patient.  Patient will be continuously monitored by telemetry and VISI.  FITBIT and IPAD will be issued during admit and returned at discharge.  Labs will be drawn and results will be monitored.   DOBUTamine and Heparin infusing at ordered rate.  Right groin dressing will be monitored.  Today's procedures/assessments/consults:  CVP.  Patient will report:  Bleeeding, SOB, pain, dizziness, and swelling.  Patient will immediately report:  inflammation, leaking, bruising or dislodged PIV.  Patient will remain free of fall/trauma/injury by using appropriate lighting, nonskid socks, and by keeping area free of debris.  Patient will call for assistance when needed. Patient verbalized understanding of all instructions.

## 2017-03-09 NOTE — PHYSICIAN QUERY
PT Name: Binh Conde  MR #: [id number]     Physician Query Form - Documentation Clarification    Reviewer  Ext Christina Mcgill RN, CCDS  [email or web address]    This form is a permanent document in the medical record.     Query Date: March 9, 2017  By submitting this query, we are merely seeking further clarification of documentation to reflect the severity of illness of your patient. Please utilize your independent clinical judgment when addressing the question(s) below.    (The Medical record reflects the following:)      Supporting Clinical Findings Location in Medical Record     transferred [place] [facility] in cardiogenic shock and consideration for heart transplant and/or advanced options  HFrEF ef <10%    warm and wet on exam   3/3 h/p     - Will transition to Lasix gtt, give IVP 80 mg and start at Lasix gtt @ 20 mg/hr    Cont  @ 5mcg/kg/min    bnp 2508     There is cardiomegaly, mild CHF,   3/3 h/p          3/3 lab    3/3 cxr                                                                            Doctor, Please specify diagnosis or diagnoses associated with above clinical findings.  Please specify acuity of HF.    Physician Use Only      (   x )  Acute on chronic systolic HF    (    )  Acute systolic HF    (    )  Chronic systolic HF    (    )  Other HF______________________                                                                                                                         [  ] Unable to determine

## 2017-03-09 NOTE — PROGRESS NOTES
Contacted Dr. Garcia with [facility] regarding patients hypotension. CVP ordered = 0. 500cc NaCl and STAT SvO2 ordered. Will continue to monitor.

## 2017-03-09 NOTE — CONSULTS
[facility]  Vascular Neurology  Comprehensive Stroke Center  Consult Note      Consults  Subjective:     History of Present Illness:  Mr. Conde is a 35 yo male with history of HFr HFrEF (EF <10%) 2/2 NICM s/p AICD (implanted 12/2013), MELAS (mitochondrial encephalopathy, lactic acidosis, and stroke-like episodes), Hx of LV thrombus, Atrial Flutter, CKD, seizure disorder who [facility] in cardiogenic shock and consideration for heart transplant and/or advanced options. Vascular neurology consulted evaluation for LVAD/ Transplant candidacy.     He was admitted to [facility] on 2/28 after being found down. CT head done there showed no acute CVA or hemorrhage and an old R PCA stroke. He was found to be in cardiogenic shock with hypotension and volume overload and [facility] for consideration for heart transplant vs advanced options.     He is followed by Dr. Ruiz with neurology at [facility]. Per chart review he was diagnosed with MELAS in around 1999 after multiple stroke like episodes.  Definitive studies included MRS (lactate peak) and a muscle biopsy positive for ragged red fibers. He was started on CoQ and levocarnitine and initially did well until several years ago when he developed heart failure. According to Dr. Ruiz's note at [facility] on 3/2 there has not been any other significant system or organ involvement besides brain and heart. He has also not had stroke like episodes in more than 10 years. He also takes Keppra for seizures.     Stroke code called at 1036 after patient had what appeared to RN as a seizure. Suddenly, he fell back and eyes rolled back and he became unresponsive to verbal and tactile stimuli for 1 minute. After regaining consciousness, he exhibited left hemiparesis, aphasia and BLE paresis. Patient is mute at this time. He is cool clammy and BP is 95/60. He has been in hospital for 4-5 days for eval of cardiomyopathy. History of atrial flutter and  left  atrial enlargement on therapeutic heparin and started on warfarin 3/8/2017        Neurologic Chief Complaint: See HPI    Subjective:      Interval History: Patient is seen for follow-up neurological assessment and treatment recommendations: See below    HPI, Past Medical, Family, and Social History remains the same as documented in the initial encounter.     Review of Systems   Unable to perform ROS: Mental status change     Scheduled Meds:   coenzyme Q10  400 mg Oral Daily    levetiracetam  750 mg Oral BID    levocarnitine  330 mg Oral BID    pantoprazole  40 mg Intravenous Daily    sodium chloride 0.9%  3 mL Intravenous Q8H     Continuous Infusions:   DOBUTamine 5 mcg/kg/min (03/06/17 0701)    furosemide (LASIX) 5 mg/mL infusion (non-titrating) 10 mg/hr (03/06/17 0701)    heparin (porcine) in D5W 19 Units/kg/hr (03/06/17 0701)    nitric oxide gas       PRN Meds:alprazolam, heparin (PORCINE), heparin (PORCINE)    Objective:     Vital Signs (Most Recent):  Temp: 98.3 °F (36.8 °C) (03/06/17 0300)  Pulse: 87 (03/06/17 0701)  Resp: (!) 25 (03/06/17 0701)  BP: (!) 97/53 (03/03/17 0500)  SpO2: 100 % (03/06/17 0701)       Vital Signs Range (Last 24H):  Temp:  [97.4 °F (36.3 °C)-98.7 °F (37.1 °C)]   Pulse:  []   Resp:  [12-38]   SpO2:  [97 %-100 %]        Physical Exam   Constitutional: He appears well-developed and well-nourished.   HENT:   Head: Atraumatic.   Eyes: EOM are normal. Pupils are equal, round, and reactive to light.   Neck: Normal range of motion. Neck supple.   Cardiovascular: Normal rate, regular rhythm and normal heart sounds.    Neurological: He is alert. GCS eye subscore is 4 - spontaneous. GCS verbal subscore is 1 - none. GCS motor subscore is 6 - obeys commands.   Diaphoretic, cool with right gaze preference. Spontaneous movement RUE with automatisms. No movement against gravity of LUE, BLE. No response to deep pain LUE and BLE. Left facial weakness noted. No hemianopsia.   Skin: He is  diaphoretic.       Neurological Exam:   LOC: alert and follows requests  Language: No aphasia, Mute  Speech: No dysarthria, Mute  Orientation: Person, Mute  Memory: Mute  Visual Fields (CN II): Full  EOM (CN III, IV, VI): Gaze preference right  Oculocephalics: normal  Pupils (CN III, IV, VI): PERRL  Facial Movement (CN VII): symmetric facial expression and upper weakness left upper  Reflexes: flexor plantar responses bilaterally and 2+ throughout  Motor*: Arm Left:  Plegic (0/5), Leg Left:   Plegic (0/5), Arm Right:   Normal (5/5), Leg Right:   Plegic (0/5)  Cerebellar*: Not testable due to LOC  Sensation: No response to deep pain LUE and BLE    NIH Stroke Scale:  Interval: with any deterioration/ worsening of neurologic condition  Level of Consciousness: 0 - alert  LOC Questions: 2 - answers none correctly  LOC Commands: 0 - performs both correctly  Best Gaze: 1 - partial gaze palsy  Visual: 0 - no visual loss  Facial Palsy: 1 - minor  Motor Left Arm: 4 - no movement  Motor Right Arm: 0 - no drift  Motor Left Le - no movement  Motor Right Le - no movement  Limb Ataxia: 0 - absent  Sensory: 0 - normal  Best Language: 3 - mute  Dysarthria: 2 - near to unintelligible  Extinction and Inattention: 0 - no neglect  NIH Stroke Scale Total: 21  Hussein Coma Scale:  Best Eye Response: 4 - spontaneous  Best Motor Response: 6 - obeys commands  Best Verbal Response: 1 - none  Hussein Coma Scale Total: 11  Modified Jones[place] Scale:   Timeline: Prior to symptoms onset  Modified [place] Score: 0 - no symptoms    ABCD2 Scale for TIA:   Age > or = 60: 0 - no  B/P or = 140/9 at Initial Evaluation: 0 - no  Clinical Features of TIA: 2 - unilateral weakness  Duration of Symptoms: 1 - 10-59 minutes  Diabetes Mellitus in History: 0 - no  ABCD2 Scale Total: 3  [id number] Scale:   Age: 0 - < 65 years old  CHF History: 1 - yes  HTN History: 0 - no  Stroke/TIA/Thromboembolism History: 2 - yes  Vascular Disease History: 0 - no  Diabetes  Mellitus in History: 0 - no  Female: 0 - no  [id number] Scale Total: 3      Laboratory:  CMP:     Recent Labs  Lab 03/09/17  0442   CALCIUM 9.5   ALBUMIN 3.2*   PROT 7.3   *   K 5.0   CO2 27   CL 89*   BUN 71*   CREATININE 2.4*   ALKPHOS 193*   *   AST 51*   BILITOT 2.8*     CBC:     Recent Labs  Lab 03/09/17  0442   WBC 11.32   RBC 4.67   HGB 14.1   HCT 41.6   *   MCV 89   MCH 30.2   MCHC 33.9     Lipid Panel: No results for input(s): CHOL, LDLCALC, HDL, TRIG in the last 168 hours.  Coagulation:   Recent Labs  Lab 03/03/17  0630  03/06/17  0344   INR 1.7*  < > 1.1   APTT 30.1  --   --    < > = values in this interval not displayed.  Platelet Aggregation Study: No results for input(s): PLTAGG, PLTAGINTERP, PLTAGREGLACO, ADPPLTAGGREG in the last 168 hours.  Hgb A1C: No results for input(s): HGBA1C in the last 168 hours.  TSH: No results for input(s): TSH in the last 168 hours.    Diagnostic Results:  I have personally reviewed: reviewed imaging  Findings: reviewed imaging    Assessment/Plan:     Patient is a 36 y.o. year old male with:    MELAS (mitochondrial encephalopathy, lactic acidosis and stroke-like episodes)  Patient with history of MELAS transferred with cardiogenic shock 2/2 HFrEF from [facility] for consideration for heart transplant and/or advanced options. Vascular neurology consulted for candidacy for these options.     Recommendations:   -After reviewing the very limited literature available on the topic there seems to be no contraindication for transplant or advanced options  in this patient from vascular neurology perspective. Does not seem to be significant systemic or organ involvement besides brain and heart.   -continue CoQ and levocarnitine  -As this is common cause of stroke in children and young adults and he has focal neurological changes, Stat CT of brain      Atrial flutter  Risk factor for stroke   On heparin gtt    NICM (nonischemic cardiomyopathy)  EF less than 10% on  echo  HTS following    Seizure disorder  On keppra. Possible seizure this morning with post-ictal state but he has a right gaze preference that is more suggestive of stroke given LUE and BLE plegia.    Stat EEG  Stat CT - negative for new stroke.  Gas in cavernous sinuses and right cerebrum. ? R to L shunt.    CKD (chronic kidney disease) stage 4, GFR 15-29 ml/min  Risk factor for stroke    * Cardiogenic shock, resolved as of 3/7/2017  HTS managing       Thrombolysis Candidate? No  1. Contraindications: Recent surgery/trauma (<15 days)  2. Warnings: Left heart thrombus     Interventional Revascularization Candidate?  No; No significant neurological deficit    Research Candidate? No-->     Natacha Julio MD  Comprehensive Stroke Center  Department of Vascular Neurology   [facility]

## 2017-03-09 NOTE — PT/OT/SLP DISCHARGE
Occupational Therapy Discharge Summary    Binh Conde  MRN: [id number]   Cardiogenic shock   Patient Discharged from acute Occupational Therapy on 03/09/17.       Assessment:   Patient appropriate for care in another setting.  GOALS:   Occupational Therapy Goals        Problem: Occupational Therapy Goal    Goal Priority Disciplines Outcome Interventions   Occupational Therapy Goal     OT, PT/OT Ongoing (interventions implemented as appropriate)    Description:  Goals to be met by: 3/16/17     Patient will increase functional independence with ADLs by performing:    UE Dressing with Supervision.  LE Dressing with Supervision.  Grooming while standing at sink with Supervision.  Toileting from toilet with Supervision for hygiene and clothing management.   Toilet transfer to toilet with Supervision.  Upper extremity exercise program x10 reps per handout, with independence.              Reasons for Discontinuation of Therapy Services  Transfer to alternate level of care.      Plan:  Patient Discharged to: Pt t/f'd to ICU for seizure-like activity and acute onset of L sided weakness.

## 2017-03-09 NOTE — NURSING
Patient was alert and oriented x 4 prior to incident.  Patient ambulated to bathroom and had a bowel movement and urinated 250 cc.  Patient sat at bedside for a few minutes.  Bedside performed CVP = 10.  At approximately 1030 hours patient appeared to experience a seizure.  Called stroke code at 1032 hours.  Patient's eyes rolled upward, body stiffened and no expressive or receptive language was noted.  Patient's body became cool and clammy.  BS = 171.  Vital signs:  BP:  95/69, HR:  78; 2L Nasal Cannula SPO2 = 94%.  MARJORIE Pavon M.D. arrived to room at 1036 hours and assessed the patient.  MARJORIE Pavon M.D. Called GAY Julio (Vascular Neurology).  GAY Julio arrived at 1047 hours.  [facility] were at room at 1036 hours.  GAY Julio Ordered: STAT EKG, CT, and Levetiracetam levels.  Telemetry notified (Za) of move to room 3090.  Called report to [facility] at 1100 hours, and reported morning medications were not administered.  Called Consuelo (sister) [phone] to notify of patient's change of room.  Called Toney Godinezetry that box is in dirty bin.

## 2017-03-09 NOTE — NURSING
Pt found actively bleeding from R groin site, sleeping in bed. Heparin drip placed on hold. BP 96/57, pt denies lightheadedness, dizziness, pain or SOB. Saturated gauze removed. Manual pressure held for 25 minutes by RN. Dr. Caro notified, reported to bedside. Manual pressure held by MD for 10 minutes. Redressed groin site with gauze, tegaderm. Stated to restart heparin drip, monitor groin site very closely. Will implement. Will continue to monitor.

## 2017-03-09 NOTE — PROGRESS NOTES
Report of brief Neuropsychological Evaluation is completed. It can be accessed through the Chart Review activity in Epic under the Notes tab (11th tab to the right of the Encounters tab).  It is titled Psych Testing.

## 2017-03-09 NOTE — PROGRESS NOTES
Progress Note  HTS    Admit Date: 3/3/2017   LOS: 6 days     SUBJECTIVE:     Patient seen and examined. Was well this AM, then had acute neurologic change with seizure like activity and L sided weakness. Code stroke initiated, seen by neuro and had CTs performed. Moved to ICU and neurologic function back  To baseline.    Scheduled Meds:   coenzyme Q10  400 mg Oral Daily    hydrALAZINE  25 mg Oral Q8H    isosorbide dinitrate  10 mg Oral TID    levetiracetam  750 mg Oral BID    levocarnitine  330 mg Oral BID    senna-docusate 8.6-50 mg  2 tablet Oral BID    sodium chloride 0.9%  3 mL Intravenous Q8H    warfarin  5 mg Oral Daily     Continuous Infusions:   DOBUTamine 2.5 mcg/kg/min (03/07/17 1915)    heparin (porcine) in D5W 14 Units/kg/hr (03/08/17 1244)     PRN Meds:alprazolam, heparin (PORCINE), heparin (PORCINE)    Review of patient's allergies indicates:   Allergen Reactions    Aspirin     Bactrim [sulfamethoxazole-trimethoprim]     Depakote [divalproex]     Dilantin [phenytoin sodium extended]     Lorazepam     Phenobarbital     Tegretol [carbamazepine]     Thallium-201        OBJECTIVE:     Vital Signs (Most Recent)  Temp: 97.5 °F (36.4 °C) (03/09/17 0800)  Pulse: 103 (03/09/17 1224)  Resp: 16 (03/09/17 0800)  BP: 116/78 (03/09/17 0800)  SpO2: 95 % (03/09/17 1224)    Vital Signs Range (Last 24H):  Temp:  [97.5 °F (36.4 °C)-97.8 °F (36.6 °C)]   Pulse:  []   Resp:  [13-16]   BP: ()/(57-83)   SpO2:  [95 %-99 %]     I & O (Last 24H):    Intake/Output Summary (Last 24 hours) at 03/09/17 1329  Last data filed at 03/09/17 1003   Gross per 24 hour   Intake              540 ml   Output             1450 ml   Net             -910 ml       Physical Exam:   General: Patient in no acute distress or discomfort  HEENT: No JVD, moist mucous membranes  Cardiac: S1S2 RRR  Chest: CTABL, no wheezing or rales  Abd:Soft NTND  Ext: No Edema No swelling  Neuro: A and O X 3, non focal    LABS  CBC with Diff:      Recent Labs  Lab 03/07/17  0332 03/08/17  0510 03/09/17  0442   WBC 14.01* 14.53* 11.32   HGB 15.2 15.5 14.1   HCT 45.2 45.5 41.6   PLT 83* 132* 148*   LYMPH 14.3*  2.0 9.5*  1.4 14.9*  1.7   MONO 7.2  1.0 12.3  1.8* 13.1  1.5*   EOSINOPHIL 0.4 0.5 0.8       COAG:    Recent Labs  Lab 03/03/17  0630  03/07/17  0332 03/08/17  0510 03/09/17 0442   APTT 30.1  --   --   --   --    INR 1.7*  < > 1.1 1.1 1.1   < > = values in this interval not displayed.    CMP:     Recent Labs  Lab 03/07/17 0332 03/08/17  0510 03/09/17  0442   * 156* 137*   CALCIUM 9.7 10.2 9.5   ALBUMIN 3.3* 3.5 3.2*   PROT 7.5 8.1 7.3   * 130* 131*   K 5.7* 5.1 5.0   CO2 33* 32* 27   CL 85* 83* 89*   BUN 65* 73* 71*   CREATININE 2.4* 2.6* 2.4*   ALKPHOS 210* 220* 193*   * 190* 128*   AST 68* 57* 51*   BILITOT 3.6* 3.2* 2.8*   MG 2.3 2.5 2.3   PHOS 4.0 4.7* 4.4     Estimated Creatinine Clearance: 31.1 mL/min (based on Cr of 2.4).    .    Recent Labs  Lab 03/03/17  0630   BNP 2508*       Present on Admission:   (Resolved) Cardiogenic shock   MELAS (mitochondrial encephalopathy, lactic acidosis and stroke-like episodes)   Atrial flutter   NICM (nonischemic cardiomyopathy)   Seizure disorder   CKD (chronic kidney disease) stage 4, GFR 15-29 ml/min   Thrombocytopenia   Coagulopathy   Transaminitis   Hypoalbuminemia   Cardiogenic shock      ASSESSMENT / PLAN:   35 Y/O M gentleman with PMH of HFrEF (EF <10%) 2/2 NICM s/p AICD (implanted 12/2013), MELAS (mitochondrial encephalopathy, lactic acidosis, and stroke-like episodes), Hx of LV thrombus, Atrial Flutter, CKD, seizure disorder, who is now transferred to [facility] in cardiogenic shock and consideration for heart transplant and/or advanced options. IABP now out and decreasing support    -NICM now in Cardiogenic shock:  Continue  at 2.5mcg/kg/min  Holding lasix today  Initiated pathway for evaluation of advanced options, will proceed  CTs  completed  Hydralazine 25mg and isordil 10mg, both TID    Acute CVA -- CT Head suggests air embolism  Check EEG and MRI  Appreciate stroke team assistance  Continue heparin    Atrial FLutter   S/p RENÉE/ DCCV currnetly in sinus  C/W Heparin for anticoagualtion  Start warfarin 5mg  Follow up with Dr. Reyez 4 weeks after DC    SOILA  Appreciated [facility] Neurology consult, no contraindication to proceed with evaluation for advanced options  C/W home medications Kepra, L carnitine and Co enz Q  Checking keppra level    Cardiac diet with boost  Full Code  PT/OT    Further recommendations per attending addendum    Sergio Pavon MD  PGY-4 ([phone])  Cardiology Fellow

## 2017-03-09 NOTE — CONSULTS
Received consult for new Heart Failure pathway requiring low sodium/fluid restriction meal planning.  Attempted to see patient but unable as patient had seizure and not appropriate at this time.  Re-consult RD when patient stable and able to receive education.

## 2017-03-10 PROBLEM — R41.89 COGNITIVE IMPAIRMENT: Status: ACTIVE | Noted: 2017-03-10

## 2017-03-10 LAB
ALBUMIN SERPL BCP-MCNC: 3 G/DL
ALLENS TEST: ABNORMAL
ALP SERPL-CCNC: 171 U/L
ALT SERPL W/O P-5'-P-CCNC: 117 U/L
ANION GAP SERPL CALC-SCNC: 14 MMOL/L
AST SERPL-CCNC: 357 U/L
BASOPHILS # BLD AUTO: 0.02 K/UL
BASOPHILS # BLD AUTO: 0.03 K/UL
BASOPHILS NFR BLD: 0.1 %
BASOPHILS NFR BLD: 0.2 %
BILIRUB SERPL-MCNC: 2.8 MG/DL
BILIRUB UR QL STRIP: NEGATIVE
BUN SERPL-MCNC: 68 MG/DL
CALCIUM SERPL-MCNC: 9.2 MG/DL
CHLORIDE SERPL-SCNC: 91 MMOL/L
CLARITY UR REFRACT.AUTO: CLEAR
CO2 SERPL-SCNC: 25 MMOL/L
COLOR UR AUTO: YELLOW
CREAT SERPL-MCNC: 2.4 MG/DL
DELSYS: ABNORMAL
DIFFERENTIAL METHOD: ABNORMAL
DIFFERENTIAL METHOD: ABNORMAL
EOSINOPHIL # BLD AUTO: 0.1 K/UL
EOSINOPHIL # BLD AUTO: 0.1 K/UL
EOSINOPHIL NFR BLD: 0.2 %
EOSINOPHIL NFR BLD: 0.7 %
ERYTHROCYTE [DISTWIDTH] IN BLOOD BY AUTOMATED COUNT: 15.2 %
ERYTHROCYTE [DISTWIDTH] IN BLOOD BY AUTOMATED COUNT: 15.2 %
EST. GFR  (AFRICAN AMERICAN): 38.7 ML/MIN/1.73 M^2
EST. GFR  (NON AFRICAN AMERICAN): 33.4 ML/MIN/1.73 M^2
FACT X PPP CHRO-ACNC: 0.44 IU/ML
FIO2: 21
GLUCOSE SERPL-MCNC: 141 MG/DL
GLUCOSE UR QL STRIP: NEGATIVE
HCO3 UR-SCNC: 27.9 MMOL/L (ref 24–28)
HCT VFR BLD AUTO: 35.2 %
HCT VFR BLD AUTO: 37.7 %
HGB BLD-MCNC: 12.1 G/DL
HGB BLD-MCNC: 12.8 G/DL
HGB UR QL STRIP: NEGATIVE
INR PPP: 1.1
KETONES UR QL STRIP: NEGATIVE
LEUKOCYTE ESTERASE UR QL STRIP: NEGATIVE
LEVETIRACETAM SERPL-MCNC: 41 UG/ML (ref 3–60)
LYMPHOCYTES # BLD AUTO: 1.5 K/UL
LYMPHOCYTES # BLD AUTO: 1.9 K/UL
LYMPHOCYTES NFR BLD: 7.9 %
LYMPHOCYTES NFR BLD: 8.7 %
MAGNESIUM SERPL-MCNC: 2.4 MG/DL
MCH RBC QN AUTO: 30 PG
MCH RBC QN AUTO: 30.3 PG
MCHC RBC AUTO-ENTMCNC: 34 %
MCHC RBC AUTO-ENTMCNC: 34.4 %
MCV RBC AUTO: 88 FL
MCV RBC AUTO: 88 FL
MODE: ABNORMAL
MONOCYTES # BLD AUTO: 1.6 K/UL
MONOCYTES # BLD AUTO: 1.6 K/UL
MONOCYTES NFR BLD: 6.4 %
MONOCYTES NFR BLD: 9.4 %
NEUTROPHILS # BLD AUTO: 13.8 K/UL
NEUTROPHILS # BLD AUTO: 20.6 K/UL
NEUTROPHILS NFR BLD: 80.2 %
NEUTROPHILS NFR BLD: 85.4 %
NITRITE UR QL STRIP: NEGATIVE
PCO2 BLDA: 38.4 MMHG (ref 35–45)
PH SMN: 7.47 [PH] (ref 7.35–7.45)
PH UR STRIP: 7 [PH] (ref 5–8)
PHOSPHATE SERPL-MCNC: 3.6 MG/DL
PLATELET # BLD AUTO: 156 K/UL
PLATELET # BLD AUTO: 159 K/UL
PMV BLD AUTO: 12 FL
PMV BLD AUTO: 12.2 FL
PO2 BLDA: 37 MMHG (ref 40–60)
POC BE: 4 MMOL/L
POC SATURATED O2: 74 % (ref 95–100)
POC TCO2: 29 MMOL/L (ref 24–29)
POTASSIUM SERPL-SCNC: 4.8 MMOL/L
PROT SERPL-MCNC: 6.9 G/DL
PROT UR QL STRIP: ABNORMAL
PROTHROMBIN TIME: 12 SEC
RBC # BLD AUTO: 3.99 M/UL
RBC # BLD AUTO: 4.27 M/UL
SAMPLE: ABNORMAL
SITE: ABNORMAL
SODIUM SERPL-SCNC: 130 MMOL/L
SP GR UR STRIP: 1.01 (ref 1–1.03)
SP02: 96
URN SPEC COLLECT METH UR: ABNORMAL
UROBILINOGEN UR STRIP-ACNC: 1 EU/DL
WBC # BLD AUTO: 17.26 K/UL
WBC # BLD AUTO: 24.22 K/UL

## 2017-03-10 PROCEDURE — 25000003 PHARM REV CODE 250: Performed by: INTERNAL MEDICINE

## 2017-03-10 PROCEDURE — 80053 COMPREHEN METABOLIC PANEL: CPT

## 2017-03-10 PROCEDURE — 99233 SBSQ HOSP IP/OBS HIGH 50: CPT | Mod: ,,, | Performed by: INTERNAL MEDICINE

## 2017-03-10 PROCEDURE — 63600175 PHARM REV CODE 636 W HCPCS: Performed by: INTERNAL MEDICINE

## 2017-03-10 PROCEDURE — 84100 ASSAY OF PHOSPHORUS: CPT

## 2017-03-10 PROCEDURE — 83735 ASSAY OF MAGNESIUM: CPT

## 2017-03-10 PROCEDURE — 85025 COMPLETE CBC W/AUTO DIFF WBC: CPT

## 2017-03-10 PROCEDURE — 81003 URINALYSIS AUTO W/O SCOPE: CPT

## 2017-03-10 PROCEDURE — 25000003 PHARM REV CODE 250: Performed by: PHYSICIAN ASSISTANT

## 2017-03-10 PROCEDURE — 20000000 HC ICU ROOM

## 2017-03-10 PROCEDURE — 87086 URINE CULTURE/COLONY COUNT: CPT

## 2017-03-10 PROCEDURE — 82803 BLOOD GASES ANY COMBINATION: CPT

## 2017-03-10 PROCEDURE — 85610 PROTHROMBIN TIME: CPT

## 2017-03-10 PROCEDURE — 85520 HEPARIN ASSAY: CPT

## 2017-03-10 PROCEDURE — 99233 SBSQ HOSP IP/OBS HIGH 50: CPT | Mod: ,,, | Performed by: PSYCHIATRY & NEUROLOGY

## 2017-03-10 PROCEDURE — 87040 BLOOD CULTURE FOR BACTERIA: CPT | Mod: 59

## 2017-03-10 PROCEDURE — 94760 N-INVAS EAR/PLS OXIMETRY 1: CPT

## 2017-03-10 RX ORDER — CEFEPIME HYDROCHLORIDE 2 G/50ML
2 INJECTION, SOLUTION INTRAVENOUS
Status: DISCONTINUED | OUTPATIENT
Start: 2017-03-10 | End: 2017-03-10

## 2017-03-10 RX ORDER — CEFEPIME HYDROCHLORIDE 2 G/50ML
2 INJECTION, SOLUTION INTRAVENOUS EVERY 24 HOURS
Status: DISCONTINUED | OUTPATIENT
Start: 2017-03-10 | End: 2017-03-11

## 2017-03-10 RX ADMIN — ISOSORBIDE DINITRATE 10 MG: 10 TABLET ORAL at 06:03

## 2017-03-10 RX ADMIN — HYDRALAZINE HYDROCHLORIDE 25 MG: 25 TABLET, FILM COATED ORAL at 09:03

## 2017-03-10 RX ADMIN — LEVETIRACETAM 750 MG: 750 TABLET, FILM COATED ORAL at 10:03

## 2017-03-10 RX ADMIN — Medication 3 ML: at 09:03

## 2017-03-10 RX ADMIN — LEVOCARNITINE 3.3 ML: 1 SOLUTION ORAL at 08:03

## 2017-03-10 RX ADMIN — LEVOCARNITINE 3.3 ML: 1 SOLUTION ORAL at 09:03

## 2017-03-10 RX ADMIN — WARFARIN SODIUM 6 MG: 5 TABLET ORAL at 07:03

## 2017-03-10 RX ADMIN — Medication 400 MG: at 10:03

## 2017-03-10 RX ADMIN — HEPARIN SODIUM AND DEXTROSE 14 UNITS/KG/HR: 10000; 5 INJECTION INTRAVENOUS at 09:03

## 2017-03-10 RX ADMIN — VANCOMYCIN HYDROCHLORIDE 1250 MG: 1 INJECTION, POWDER, LYOPHILIZED, FOR SOLUTION INTRAVENOUS at 12:03

## 2017-03-10 RX ADMIN — STANDARDIZED SENNA CONCENTRATE AND DOCUSATE SODIUM 2 TABLET: 8.6; 5 TABLET, FILM COATED ORAL at 10:03

## 2017-03-10 RX ADMIN — HYDRALAZINE HYDROCHLORIDE 25 MG: 25 TABLET, FILM COATED ORAL at 06:03

## 2017-03-10 RX ADMIN — ISOSORBIDE DINITRATE 10 MG: 10 TABLET ORAL at 03:03

## 2017-03-10 RX ADMIN — STANDARDIZED SENNA CONCENTRATE AND DOCUSATE SODIUM 2 TABLET: 8.6; 5 TABLET, FILM COATED ORAL at 09:03

## 2017-03-10 RX ADMIN — Medication 3 ML: at 06:03

## 2017-03-10 RX ADMIN — HYDRALAZINE HYDROCHLORIDE 25 MG: 25 TABLET, FILM COATED ORAL at 03:03

## 2017-03-10 RX ADMIN — CEFEPIME HYDROCHLORIDE 2 G: 2 INJECTION, SOLUTION INTRAVENOUS at 03:03

## 2017-03-10 RX ADMIN — LEVETIRACETAM 750 MG: 750 TABLET, FILM COATED ORAL at 09:03

## 2017-03-10 RX ADMIN — ISOSORBIDE DINITRATE 10 MG: 10 TABLET ORAL at 09:03

## 2017-03-10 NOTE — PROGRESS NOTES
MD Caro informed of inability to perform MRI while continuous EEG in place. MD to consult neurology and determine if MRI will take place tonight. MD notified of pt. BP 89/55, and ordered to hold 2200 dose of oral anti-hypertensive meds. Will carry out and continue to monitor.

## 2017-03-10 NOTE — ASSESSMENT & PLAN NOTE
Patient with history of MELAS transferred with cardiogenic shock 2/2 HFrEF from [facility] for consideration for heart transplant and/or advanced options. Vascular neurology consulted for candidacy for these options. Stroke code called on patient on 03/09/17 for post seizure LSW and gaze preference. CT scan with venous air embolism, CTA pending    Recommendations:   -After reviewing the very limited literature available on the topic there seems to be no contraindication for transplant or advanced options  in this patient from vascular neurology perspective. Does not seem to be significant systemic or organ involvement besides brain and heart.   -continue CoQ and levocarnitine  -As this is common cause of stroke in children and young adults and he has focal neurological changes

## 2017-03-10 NOTE — ASSESSMENT & PLAN NOTE
On keppra. Possible seizure 03/09/17 with post-ictal state but he has a right gaze preference that is more suggestive of stroke given LUE and BLE plegia.    EEG negative for seizures  Stat CT - negative for new stroke.  Gas in cavernous sinuses and right cerebrum. ? R to L shunt.

## 2017-03-10 NOTE — PROCEDURES
ICU EEG/VIDEO MONITORING REPORT    Binh Conde  [id number]  1980    DATE OF SERVICE: 3/9/17 - 3/10/17    DATE OF ADMISSION: 3/3/2017  4:21 AM    ADMITTING PROVIDER: Romelia Carpenter MD    REASON FOR CONSULT: 36to M with multiple medical co-morbidities including hx of seizures (MELAS), admitted s/p cardiogenic shock and found down.    MEDICATIONS:   Current Facility-Administered Medications   Medication    alprazolam tablet 0.5 mg    coenzyme Q10 400 mg    dobutamine 1000 mg in D5W 250 mL (premix)    heparin 25,000 units in dextrose 5% 250 mL (100 units/mL) bolus from bag; ADDITIONAL PRN BOLUS    heparin 25,000 units in dextrose 5% 250 mL (100 units/mL) bolus from bag; ADDITIONAL PRN BOLUS    heparin 25,000 units in dextrose 5% 250 mL (100 units/mL) infusion    hydrALAZINE tablet 25 mg    isosorbide dinitrate tablet 10 mg    levetiracetam tablet 750 mg    levocarnitine Soln 3.3 mL    senna-docusate 8.6-50 mg per tablet 2 tablet    sodium chloride 0.9% flush 3 mL    warfarin tablet 6 mg         METHODOLOGY   Electroencephalographic (EEG) recording is with electrodes placed according to the International 10-20 placement system.  Thirty two (32) channels of digital signal are simultaneously recorded from the scalp and may include EKG, EMG, and/or eye monitors.   Recording band pass was 0.1 to 512 hz.  Digital video recording of the patient is simultaneously recorded with the EEG.  The nursing staff report clinical symptoms and may press an event button when the patient has symptoms of clinical interest to the treating physicians.  EEG and video recording is stored and archived in digital format.  The entire recording is visually reviewed and the times identified by computer analysis as being spikes or seizures are reviewed again.  Activation procedures which include photic stimulation, hyperventilation and instructing patients to perform simple task are done in selected patients.   Compresses  spectral analysis (CSA) is also performed on the activity recorded from each individual channel.  This is displayed as a power display of frequencies from 0 to 30 Hz over time.   The CSA analysis is done and displayed continuously.  This is reviewed for asymmetries in power between homologous areas of the scalp and for presence of changes in power which canbe seen when seizures occur.  Sections of suspected abnormalities on the CSA is then compared with the original EEG recording.     Perfect[employer]erve software was also utilized in the review of this study.  This software suite analyzes the EEG recording in multiple domains.  Coherence and rhythmicity is computed to identify EEG sections which may contain organized seizures.  Each channel undergoes analysis to detect presence of spike and sharp waves which have special and morphological characteristic of epileptic activity.  The routine EEG recording is converted from spacial into frequency domain.  This is then displayed comparing homologous areas to identify areas of significant asymmetry.  Algorithm to identify non-cortically generated artifact is used to separate eye movement, EMG and other artifact from the EEG.      Recording Times  Start on 03/09/17, 14:37  Stop on 03/10/17, 09:28    A total of 18 hours and 48 minutes of EEG was recorded.    EEG FINDINGS  Background activity:   The background rhythm was characterized by low voltage (<10uV) theta (6-7Hz) and delta (3-4Hz) activity with superimposed faster frequencies.  A 10 Hz posterior dominant alpha rhythm was noted.   Symmetry and continuity: the background was continuous and symmetric  Significant amount of artifacts obscure latter parts of the recording.    Abnormal activity:   No epileptiform discharges, periodic discharges, lateralized rhythmic delta activity or electrographic seizures were seen.    IMPRESSION:   This is a normal EEG except for the low voltage noted throughout.    A normal EEG does not rule out  seizures/epilepsy.  CLINICAL CORRELATION IS RECOMMENDED.    Coral Krueger MD, KWAME().  Neurology-Epilepsy.

## 2017-03-10 NOTE — PROGRESS NOTES
I was called by RN earlier tonight as patient was scheduled for MRI tonight, but also has continuous EEG monitoring. Patient had a code stroke called this morning after RN witnessed a seizure and patient later had left hemiparesis, aphasia and BLE paresis after regaining consciousness. CT head suggestive of air embolism, and patient was placed on EEG monitoring per stroke team rec's with MRI planned for later.    Issue is that EEG techs are not available overnight and EEG would have to be disconnected to go down for MRI with no further EEG monitoring post-MRI until tomorrow when techs would again be available. I spoke to stroke team on call, and have determined it would be best to defer MRI until AM to continue EEG monitoring overnight as MRI results overnight would provide little additional utility in treatment decisions at this time. Patient is currently neurologically back to baseline. Will continue to monitor.    Signed:  Mekhi Caro MD  Cardiology Fellow - PGY4  Pager: [phone]  3/9/2017 10:11 PM

## 2017-03-10 NOTE — PROGRESS NOTES
"RN attempted to get CVP and CBC at bedside. Pt refused. Pt continue to state "leave me alone" several times. Per pt he was yelled at by family. RN offered to speak to family and update them on POC. Pt also refused. Explained to pt the reason for getting CVP and repeat CBC. Pt refuse all care at this time. Dr. Pavon notified. Ordered for 250cc NS bolus. WCTM  "

## 2017-03-10 NOTE — PROGRESS NOTES
Dr. Garcia notified with pt's increasing WBC 24.22 from yesterday 11.32. Ordered for blood culture x2 and U/A. WCTM

## 2017-03-10 NOTE — PROGRESS NOTES
Savanah notified that pt only had 200cc U/O since this am. Still awaiting to collect U/A. CBC ordered by ID. Ordered to get CVP.

## 2017-03-10 NOTE — PROGRESS NOTES
Upon arrival to CMICU patient was awake and following commands. He was able to move all extremities but was weak overall and showed more weakness of the left extremities. Within an hour the patient became able to verbalize again and his orientation status was assessed. He was oriented to himself, place, but not time or situation. During the next hour the patient became fully oriented but still had delayed speech and weakness on the left side. Patient appears to be recovering from earlier event. CVP obtained = 0. Will continue to monitor. See flowsheet for full documentation.

## 2017-03-10 NOTE — PROGRESS NOTES
Progress Note  HTS    Admit Date: 3/3/2017   LOS: 7 days     SUBJECTIVE:     Patient seen and examined. Doing better this AM. No complaints    Scheduled Meds:   ceFEPime (MAXIPIME) IVPB  2 g Intravenous Daily    coenzyme Q10  400 mg Oral Daily    hydrALAZINE  25 mg Oral Q8H    isosorbide dinitrate  10 mg Oral TID    levetiracetam  750 mg Oral BID    levocarnitine  330 mg Oral BID    senna-docusate 8.6-50 mg  2 tablet Oral BID    sodium chloride 0.9%  3 mL Intravenous Q8H    vancomycin (VANCOCIN) IVPB  20 mg/kg (Dosing Weight) Intravenous Q24H    warfarin  6 mg Oral Daily     Continuous Infusions:   DOBUTamine 2.5 mcg/kg/min (03/10/17 1200)    heparin (porcine) in D5W 14.022 Units/kg/hr (03/10/17 1200)     PRN Meds:alprazolam, heparin (PORCINE), heparin (PORCINE)    Review of patient's allergies indicates:   Allergen Reactions    Aspirin     Bactrim [sulfamethoxazole-trimethoprim]     Depakote [divalproex]     Dilantin [phenytoin sodium extended]     Lorazepam     Phenobarbital     Tegretol [carbamazepine]     Thallium-201        OBJECTIVE:     Vital Signs (Most Recent)  Temp: 98.5 °F (36.9 °C) (03/10/17 0700)  Pulse: 84 (03/10/17 1200)  Resp: (!) 21 (03/10/17 1200)  BP: 92/65 (03/10/17 1200)  SpO2: 98 % (03/10/17 1200)    Vital Signs Range (Last 24H):  Temp:  [97.2 °F (36.2 °C)-98.5 °F (36.9 °C)]   Pulse:  []   Resp:  [17-40]   BP: ()/(46-74)   SpO2:  [94 %-100 %]     I & O (Last 24H):    Intake/Output Summary (Last 24 hours) at 03/10/17 1310  Last data filed at 03/10/17 1200   Gross per 24 hour   Intake            814.5 ml   Output             1275 ml   Net           -460.5 ml       Physical Exam:   General: Patient in no acute distress or discomfort  HEENT: No JVD, moist mucous membranes  Cardiac: S1S2 RRR  Chest: CTABL, no wheezing or rales  Abd:Soft NTND  Ext: No Edema No swelling  Neuro: A and O X 3, non focal    LABS  CBC with Diff:     Recent Labs  Lab 03/08/17  0510  03/09/17 0442 03/10/17  0507   WBC 14.53* 11.32 24.22*   HGB 15.5 14.1 12.8*   HCT 45.5 41.6 37.7*   * 148* 156   LYMPH 9.5*  1.4 14.9*  1.7 7.9*  1.9   MONO 12.3  1.8* 13.1  1.5* 6.4  1.6*   EOSINOPHIL 0.5 0.8 0.2       COAG:    Recent Labs  Lab 03/08/17  0510 03/09/17  0442 03/10/17  0507   INR 1.1 1.1 1.1       CMP:     Recent Labs  Lab 03/08/17  0510 03/09/17  0442 03/10/17  0507   * 137* 141*   CALCIUM 10.2 9.5 9.2   ALBUMIN 3.5 3.2* 3.0*   PROT 8.1 7.3 6.9   * 131* 130*   K 5.1 5.0 4.8   CO2 32* 27 25   CL 83* 89* 91*   BUN 73* 71* 68*   CREATININE 2.6* 2.4* 2.4*   ALKPHOS 220* 193* 171*   * 128* 117*   AST 57* 51* 357*   BILITOT 3.2* 2.8* 2.8*   MG 2.5 2.3 2.4   PHOS 4.7* 4.4 3.6     Estimated Creatinine Clearance: 31.2 mL/min (based on Cr of 2.4).    .  No results for input(s): CPK, TROPONINI, MB, BNP in the last 168 hours.    Present on Admission:   (Resolved) Cardiogenic shock   MELAS (mitochondrial encephalopathy, lactic acidosis and stroke-like episodes)   Atrial flutter   NICM (nonischemic cardiomyopathy)   Seizure disorder   CKD (chronic kidney disease) stage 4, GFR 15-29 ml/min   Thrombocytopenia   Coagulopathy   Transaminitis   Hypoalbuminemia   Cardiogenic shock   Cognitive impairment      ASSESSMENT / PLAN:   37 Y/O M gentleman with PMH of HFrEF (EF <10%) 2/2 NICM s/p AICD (implanted 12/2013), MELAS (mitochondrial encephalopathy, lactic acidosis, and stroke-like episodes), Hx of LV thrombus, Atrial Flutter, CKD, seizure disorder, who [place] [facility] in cardiogenic shock and consideration for heart transplant and/or advanced options. IABP now out and decreasing support    -NICM now in Cardiogenic shock:  Continue  at 2.5mcg/kg/min  Holding lasix today, hope to start back tomorrow PO  Initiated pathway for evaluation of advanced options, will proceed  CTs completed  Hydralazine 25mg and isordil 10mg, both  TID    Leukocytosis  --increased SvO2  --pan culture, start vanc/cefepime  --consult ID  --keep in unit today  --US with sludge; CXR with no pneumonia    Cerebral Air embolism  EEG normal   Cannot do MRI 2/2 ICD  Appreciate stroke team assistance  Continue heparin    Atrial FLutter   S/p RENÉE/ DCCV currnetly in sinus  C/W Heparin for anticoagualtion  Increase warfarin 6mg  Follow up with Dr. Reyez 4 weeks after DC    SOILA  Appreciated [facility] Neurology consult, no contraindication to proceed with evaluation for advanced options  C/W home medications Kepra, L carnitine and Co enz Q  Checking keppra level    Cardiac diet with boost  Full Code  PT/OT    Further recommendations per attending addendum    Sergio Pavon MD  PGY-4 ([phone])  Cardiology Fellow

## 2017-03-10 NOTE — SUBJECTIVE & OBJECTIVE
Neurologic Chief Complaint: L sided weakness    Subjective:     Interval History: Patient is seen for follow-up neurological assessment and treatment recommendations: LEO, patient neurologically improved, no complaints    HPI, Past Medical, Family, and Social History remains the same as documented in the initial encounter.     Review of Systems   Constitutional: Negative for chills and fever.   HENT: Negative for drooling.    Eyes: Negative for visual disturbance.   Respiratory: Negative for cough.    Cardiovascular: Negative for leg swelling.   Gastrointestinal: Negative for vomiting.   Genitourinary: Negative for hematuria.   Musculoskeletal: Negative for joint swelling.   Skin: Negative for rash.   Neurological: Positive for speech difficulty and weakness. Negative for dizziness, facial asymmetry and numbness.   Psychiatric/Behavioral: Negative for agitation and behavioral problems.     Scheduled Meds:   ceFEPime (MAXIPIME) IVPB  2 g Intravenous Daily    coenzyme Q10  400 mg Oral Daily    hydrALAZINE  25 mg Oral Q8H    isosorbide dinitrate  10 mg Oral TID    levetiracetam  750 mg Oral BID    levocarnitine  330 mg Oral BID    senna-docusate 8.6-50 mg  2 tablet Oral BID    sodium chloride 0.9%  3 mL Intravenous Q8H    vancomycin (VANCOCIN) IVPB  20 mg/kg (Dosing Weight) Intravenous Q24H    warfarin  6 mg Oral Daily     Continuous Infusions:   DOBUTamine 2.5 mcg/kg/min (03/10/17 1200)    heparin (porcine) in D5W 14.022 Units/kg/hr (03/10/17 1200)     PRN Meds:alprazolam, heparin (PORCINE), heparin (PORCINE)    Objective:     Vital Signs (Most Recent):  Temp: 98.5 °F (36.9 °C) (03/10/17 0700)  Pulse: 84 (03/10/17 1200)  Resp: (!) 21 (03/10/17 1200)  BP: 92/65 (03/10/17 1200)  SpO2: 98 % (03/10/17 1200)  BP Location: Right arm    Vital Signs Range (Last 24H):  Temp:  [97.2 °F (36.2 °C)-98.5 °F (36.9 °C)]   Pulse:  []   Resp:  [17-40]   BP: ()/(46-74)   SpO2:  [94 %-100 %]   BP Location: Right  arm    Physical Exam   Constitutional: He appears well-developed and well-nourished. No distress.   HENT:   Head: Normocephalic and atraumatic.   Eyes: EOM are normal. Pupils are equal, round, and reactive to light.   Cardiovascular: Normal rate.    Pulmonary/Chest: Effort normal. No respiratory distress.   Abdominal: He exhibits no distension.   Neurological: He is alert.   Skin: Skin is warm and dry.   Psychiatric: He has a normal mood and affect. His behavior is normal.   Vitals reviewed.      Neurological Exam:   LOC: alert and follows requests  Language: Expressive aphasia  Speech: Dysarthria  Orientation: Person, Place, Time  Memory: Month correct, Abnormalities: No age correct  Visual Fields (CN II): Full  EOM (CN III, IV, VI): Full/intact  Pupils (CN III, IV, VI): PERRL  Facial Movement (CN VII): symmetric facial expression  Hearing (CN VIII): intact bilaterally  Motor*: Arm Left:  Paretic:  4/5, Leg Left:   Paretic:  4/5, Arm Right:   Normal (5/5), Leg Right:   Normal (5/5)  Sensation: intact to light touch, temperature and vibration  Tone: Arm-Left: normal; Leg-Left: normal; Arm-Right: normal; Leg-Right: normal    NIH Stroke Scale:    Level of Consciousness: 0 - alert  LOC Questions: 1 - answers one correctly  LOC Commands: 0 - performs both correctly  Best Gaze: 0 - normal  Visual: 0 - no visual loss  Facial Palsy: 0 - normal  Motor Left Arm: 1 - drift  Motor Right Arm: 0 - no drift  Motor Left Le - drift  Motor Right Le - no drift  Limb Ataxia: 0 - absent  Sensory: 0 - normal  Best Language: 1 - mild to moderate aphasia  Dysarthria: 1 - mild to moderate dysarthria  Extinction and Inattention: 0 - no neglect  NIH Stroke Scale Total: 5      Laboratory:  CMP:   Recent Labs  Lab 03/10/17  0507   CALCIUM 9.2   ALBUMIN 3.0*   PROT 6.9   *   K 4.8   CO2 25   CL 91*   BUN 68*   CREATININE 2.4*   ALKPHOS 171*   *   *   BILITOT 2.8*     CBC:   Recent Labs  Lab 03/10/17  0507   WBC  24.22*   RBC 4.27*   HGB 12.8*   HCT 37.7*      MCV 88   MCH 30.0   MCHC 34.0     Lipid Panel: No results for input(s): CHOL, LDLCALC, HDL, TRIG in the last 168 hours.  Coagulation:   Recent Labs  Lab 03/10/17  0507   INR 1.1     Platelet Aggregation Study: No results for input(s): PLTAGG, PLTAGINTERP, PLTAGREGLACO, ADPPLTAGGREG in the last 168 hours.  Hgb A1C: No results for input(s): HGBA1C in the last 168 hours.  TSH: No results for input(s): TSH in the last 168 hours.    Diagnostic Results:  I have personally reviewed:   Findings:     CT Head. Date: 03/10/17  -Interval development of venous air embolism within the cavernous sinuses and smaller component within the frontal sulci  -No evidence for acute intracranial hemorrhage or hydrocephalus with remote right parietal and posterior temporal occipital infarct with age advanced cerebral volume loss.    CT Head. Date: 03/08/17  -no acute abnormalities

## 2017-03-10 NOTE — PLAN OF CARE
Problem: Patient Care Overview  Goal: Plan of Care Review  Outcome: Ongoing (interventions implemented as appropriate)  Heparin and dobutamine gtts infusing as ordered overnight. VSS during shift. MRI held d/t continuous EEG (see prev. Note). Neuro status remains unchanged from beginning of shift (see flowsheet for details). Morning Sv02 74. POC for MRI during day shift discussed with pt. All questions and concerns were addressed. Pt. Verbalized understanding.

## 2017-03-10 NOTE — PROGRESS NOTES
[facility]  Vascular Neurology  Comprehensive Stroke Center  Progress Note      Neurologic Chief Complaint: L sided weakness    Subjective:     Interval History: Patient is seen for follow-up neurological assessment and treatment recommendations: LEO, patient neurologically improved, no complaints    HPI, Past Medical, Family, and Social History remains the same as documented in the initial encounter.     Review of Systems   Constitutional: Negative for chills and fever.   HENT: Negative for drooling.    Eyes: Negative for visual disturbance.   Respiratory: Negative for cough.    Cardiovascular: Negative for leg swelling.   Gastrointestinal: Negative for vomiting.   Genitourinary: Negative for hematuria.   Musculoskeletal: Negative for joint swelling.   Skin: Negative for rash.   Neurological: Positive for speech difficulty and weakness. Negative for dizziness, facial asymmetry and numbness.   Psychiatric/Behavioral: Negative for agitation and behavioral problems.     Scheduled Meds:   ceFEPime (MAXIPIME) IVPB  2 g Intravenous Daily    coenzyme Q10  400 mg Oral Daily    hydrALAZINE  25 mg Oral Q8H    isosorbide dinitrate  10 mg Oral TID    levetiracetam  750 mg Oral BID    levocarnitine  330 mg Oral BID    senna-docusate 8.6-50 mg  2 tablet Oral BID    sodium chloride 0.9%  3 mL Intravenous Q8H    vancomycin (VANCOCIN) IVPB  20 mg/kg (Dosing Weight) Intravenous Q24H    warfarin  6 mg Oral Daily     Continuous Infusions:   DOBUTamine 2.5 mcg/kg/min (03/10/17 1200)    heparin (porcine) in D5W 14.022 Units/kg/hr (03/10/17 1200)     PRN Meds:alprazolam, heparin (PORCINE), heparin (PORCINE)    Objective:     Vital Signs (Most Recent):  Temp: 98.5 °F (36.9 °C) (03/10/17 0700)  Pulse: 84 (03/10/17 1200)  Resp: (!) 21 (03/10/17 1200)  BP: 92/65 (03/10/17 1200)  SpO2: 98 % (03/10/17 1200)  BP Location: Right arm    Vital Signs Range (Last 24H):  Temp:  [97.2 °F (36.2 °C)-98.5 °F (36.9 °C)]    Pulse:  []   Resp:  [17-40]   BP: ()/(46-74)   SpO2:  [94 %-100 %]   BP Location: Right arm    Physical Exam   Constitutional: He appears well-developed and well-nourished. No distress.   HENT:   Head: Normocephalic and atraumatic.   Eyes: EOM are normal. Pupils are equal, round, and reactive to light.   Cardiovascular: Normal rate.    Pulmonary/Chest: Effort normal. No respiratory distress.   Abdominal: He exhibits no distension.   Neurological: He is alert.   Skin: Skin is warm and dry.   Psychiatric: He has a normal mood and affect. His behavior is normal.   Vitals reviewed.      Neurological Exam:   LOC: alert and follows requests  Language: Expressive aphasia  Speech: Dysarthria  Orientation: Person, Place, Time  Memory: Month correct, Abnormalities: No age correct  Visual Fields (CN II): Full  EOM (CN III, IV, VI): Full/intact  Pupils (CN III, IV, VI): PERRL  Facial Movement (CN VII): symmetric facial expression  Hearing (CN VIII): intact bilaterally  Motor*: Arm Left:  Paretic:  4/5, Leg Left:   Paretic:  4/5, Arm Right:   Normal (5/5), Leg Right:   Normal (5/5)  Sensation: intact to light touch, temperature and vibration  Tone: Arm-Left: normal; Leg-Left: normal; Arm-Right: normal; Leg-Right: normal    NIH Stroke Scale:    Level of Consciousness: 0 - alert  LOC Questions: 1 - answers one correctly  LOC Commands: 0 - performs both correctly  Best Gaze: 0 - normal  Visual: 0 - no visual loss  Facial Palsy: 0 - normal  Motor Left Arm: 1 - drift  Motor Right Arm: 0 - no drift  Motor Left Le - drift  Motor Right Le - no drift  Limb Ataxia: 0 - absent  Sensory: 0 - normal  Best Language: 1 - mild to moderate aphasia  Dysarthria: 1 - mild to moderate dysarthria  Extinction and Inattention: 0 - no neglect  NIH Stroke Scale Total: 5      Laboratory:  CMP:   Recent Labs  Lab 03/10/17  0507   CALCIUM 9.2   ALBUMIN 3.0*   PROT 6.9   *   K 4.8   CO2 25   CL 91*   BUN 68*   CREATININE 2.4*    ALKPHOS 171*   *   *   BILITOT 2.8*     CBC:   Recent Labs  Lab 03/10/17  0507   WBC 24.22*   RBC 4.27*   HGB 12.8*   HCT 37.7*      MCV 88   MCH 30.0   MCHC 34.0     Lipid Panel: No results for input(s): CHOL, LDLCALC, HDL, TRIG in the last 168 hours.  Coagulation:   Recent Labs  Lab 03/10/17  0507   INR 1.1     Platelet Aggregation Study: No results for input(s): PLTAGG, PLTAGINTERP, PLTAGREGLACO, ADPPLTAGGREG in the last 168 hours.  Hgb A1C: No results for input(s): HGBA1C in the last 168 hours.  TSH: No results for input(s): TSH in the last 168 hours.    Diagnostic Results:  I have personally reviewed:   Findings:     CT Head. Date: 03/10/17  -Interval development of venous air embolism within the cavernous sinuses and smaller component within the frontal sulci  -No evidence for acute intracranial hemorrhage or hydrocephalus with remote right parietal and posterior temporal occipital infarct with age advanced cerebral volume loss.    CT Head. Date: 03/08/17  -no acute abnormalities      Assessment/Plan:     Mr. Conde is a 37 yo male with history of HFr HFrEF (EF <10%) 2/2 NICM s/p AICD (implanted 12/2013), MELAS (mitochondrial encephalopathy, lactic acidosis, and stroke-like episodes), Hx of LV thrombus, Atrial Flutter, CKD, seizure disorder who was transferred [place] [facility] from [facility] in cardiogenic shock and consideration for heart transplant and/or advanced options. Vascular neurology consulted evaluation for LVAD/ Transplant candidacy.     03/04/17  Alert and following commands. Has some weakness on right side.    03/06/17  Remains neurologically stable with mild R sided weakness     03/08/17 Neurologically improved with LUE and LLE drift, CTA pending      MELAS (mitochondrial encephalopathy, lactic acidosis and stroke-like episodes)  Patient with history of MELAS transferred with cardiogenic shock 2/2 HFrEF from [facility] for consideration for heart transplant and/or advanced options. Vascular  neurology consulted for candidacy for these options. Stroke code called on patient on 03/09/17 for post seizure LSW and gaze preference. CT scan with venous air embolism, CTA pending    Recommendations:   -After reviewing the very limited literature available on the topic there seems to be no contraindication for transplant or advanced options  in this patient from vascular neurology perspective. Does not seem to be significant systemic or organ involvement besides brain and heart.   -continue CoQ and levocarnitine  -As this is common cause of stroke in children and young adults and he has focal neurological changes      Atrial flutter  Risk factor for stroke   On heparin gtt    NICM (nonischemic cardiomyopathy)  EF less than 10% on echo  HTS following    Seizure disorder  On keppra. Possible seizure 03/09/17 with post-ictal state but he has a right gaze preference that is more suggestive of stroke given LUE and BLE plegia.    EEG negative for seizures  Stat CT - negative for new stroke.  Gas in cavernous sinuses and right cerebrum. ? R to L shunt.    CKD (chronic kidney disease) stage 4, GFR 15-29 ml/min  Risk factor for stroke          Nesha Mazariegos PA-C  Comprehensive Stroke Center  Department of Vascular Neurology   [facility]

## 2017-03-11 PROBLEM — R57.0 CARDIOGENIC SHOCK: Status: RESOLVED | Noted: 2017-03-09 | Resolved: 2017-03-11

## 2017-03-11 PROBLEM — G81.04 FLACCID HEMIPLEGIA AFFECTING LEFT NONDOMINANT SIDE: Status: RESOLVED | Noted: 2017-03-09 | Resolved: 2017-03-11

## 2017-03-11 PROBLEM — G81.04 FLACCID HEMIPLEGIA AFFECTING LEFT NONDOMINANT SIDE: Status: RESOLVED | Noted: 2017-03-11 | Resolved: 2017-03-11

## 2017-03-11 LAB
ALBUMIN SERPL BCP-MCNC: 3 G/DL
ALP SERPL-CCNC: 163 U/L
ALT SERPL W/O P-5'-P-CCNC: 73 U/L
ANION GAP SERPL CALC-SCNC: 13 MMOL/L
ANISOCYTOSIS BLD QL SMEAR: SLIGHT
AST SERPL-CCNC: 105 U/L
BASOPHILS # BLD AUTO: 0.02 K/UL
BASOPHILS NFR BLD: 0.2 %
BILIRUB SERPL-MCNC: 2 MG/DL
BUN SERPL-MCNC: 42 MG/DL
CALCIUM SERPL-MCNC: 9.6 MG/DL
CHLORIDE SERPL-SCNC: 95 MMOL/L
CO2 SERPL-SCNC: 21 MMOL/L
CREAT SERPL-MCNC: 2 MG/DL
DIFFERENTIAL METHOD: ABNORMAL
EOSINOPHIL # BLD AUTO: 0.2 K/UL
EOSINOPHIL NFR BLD: 1.3 %
ERYTHROCYTE [DISTWIDTH] IN BLOOD BY AUTOMATED COUNT: 15.3 %
EST. GFR  (AFRICAN AMERICAN): 48.2 ML/MIN/1.73 M^2
EST. GFR  (NON AFRICAN AMERICAN): 41.7 ML/MIN/1.73 M^2
FACT X PPP CHRO-ACNC: 0.24 IU/ML
GLUCOSE SERPL-MCNC: 147 MG/DL
HCT VFR BLD AUTO: 36 %
HGB BLD-MCNC: 12 G/DL
HYPOCHROMIA BLD QL SMEAR: ABNORMAL
INR PPP: 1.1
LYMPHOCYTES # BLD AUTO: 2.1 K/UL
LYMPHOCYTES NFR BLD: 15.5 %
MAGNESIUM SERPL-MCNC: 2 MG/DL
MCH RBC QN AUTO: 30 PG
MCHC RBC AUTO-ENTMCNC: 33.3 %
MCV RBC AUTO: 90 FL
MONOCYTES # BLD AUTO: 1.5 K/UL
MONOCYTES NFR BLD: 11.2 %
NEUTROPHILS # BLD AUTO: 9.4 K/UL
NEUTROPHILS NFR BLD: 71.8 %
PHOSPHATE SERPL-MCNC: 3.3 MG/DL
PLATELET # BLD AUTO: 187 K/UL
PLATELET BLD QL SMEAR: ABNORMAL
PMV BLD AUTO: 11.7 FL
POLYCHROMASIA BLD QL SMEAR: ABNORMAL
POTASSIUM SERPL-SCNC: 4.8 MMOL/L
PROT SERPL-MCNC: 7 G/DL
PROTHROMBIN TIME: 11.4 SEC
RBC # BLD AUTO: 4 M/UL
SODIUM SERPL-SCNC: 129 MMOL/L
WBC # BLD AUTO: 13.26 K/UL

## 2017-03-11 PROCEDURE — 20000000 HC ICU ROOM

## 2017-03-11 PROCEDURE — 83735 ASSAY OF MAGNESIUM: CPT

## 2017-03-11 PROCEDURE — 84100 ASSAY OF PHOSPHORUS: CPT

## 2017-03-11 PROCEDURE — 99233 SBSQ HOSP IP/OBS HIGH 50: CPT | Mod: ,,, | Performed by: INTERNAL MEDICINE

## 2017-03-11 PROCEDURE — 85025 COMPLETE CBC W/AUTO DIFF WBC: CPT

## 2017-03-11 PROCEDURE — 25000003 PHARM REV CODE 250: Performed by: STUDENT IN AN ORGANIZED HEALTH CARE EDUCATION/TRAINING PROGRAM

## 2017-03-11 PROCEDURE — 80053 COMPREHEN METABOLIC PANEL: CPT

## 2017-03-11 PROCEDURE — 25000003 PHARM REV CODE 250: Performed by: INTERNAL MEDICINE

## 2017-03-11 PROCEDURE — 85520 HEPARIN ASSAY: CPT

## 2017-03-11 PROCEDURE — 63600175 PHARM REV CODE 636 W HCPCS: Performed by: INTERNAL MEDICINE

## 2017-03-11 PROCEDURE — 85610 PROTHROMBIN TIME: CPT

## 2017-03-11 RX ORDER — CEFEPIME HYDROCHLORIDE 2 G/50ML
2 INJECTION, SOLUTION INTRAVENOUS
Status: COMPLETED | OUTPATIENT
Start: 2017-03-11 | End: 2017-03-16

## 2017-03-11 RX ADMIN — ISOSORBIDE DINITRATE 10 MG: 10 TABLET ORAL at 10:03

## 2017-03-11 RX ADMIN — CEFEPIME HYDROCHLORIDE 2 G: 2 INJECTION, POWDER, FOR SOLUTION INTRAVENOUS at 08:03

## 2017-03-11 RX ADMIN — HEPARIN SODIUM AND DEXTROSE 14 UNITS/KG/HR: 10000; 5 INJECTION INTRAVENOUS at 10:03

## 2017-03-11 RX ADMIN — Medication: at 10:03

## 2017-03-11 RX ADMIN — DOBUTAMINE IN DEXTROSE 2.5 MCG/KG/MIN: 400 INJECTION, SOLUTION INTRAVENOUS at 08:03

## 2017-03-11 RX ADMIN — LEVETIRACETAM 750 MG: 750 TABLET, FILM COATED ORAL at 09:03

## 2017-03-11 RX ADMIN — HYDRALAZINE HYDROCHLORIDE 25 MG: 25 TABLET, FILM COATED ORAL at 10:03

## 2017-03-11 RX ADMIN — ALPRAZOLAM 0.5 MG: 0.5 TABLET ORAL at 10:03

## 2017-03-11 NOTE — PROGRESS NOTES
Contacted on-call MD for HTS (who is Dr. LIANA Harden) to touch base regarding risk of PT being on heparin infusion with no Xa drawn in over 24 hours and PT's unwillingness to allow RN to draw blood for any labs or to have vital signs taken (blood pressure and oxygenation) for entirety of shift- MD states he is aware of the difficulty of the situation but states he has no solution at this time.   This RN sitting at window visually monitoring PT in case attempt to got out of bed or pull out central line is made.  PT lying in bed quietly at the moment.

## 2017-03-11 NOTE — PROGRESS NOTES
"RN approached pt. to obtain labs and administer ordered medication. Pt. Refused labwork and medication at this time. Pt. States "its 5 in the morning come back later." Importance of timing in medication regimen, purpose of ordered antihypertensives, and rationale for ordered lab work explained to pt. Pt. Refusing all care at this time. Will continue to monitor and reassess before shift end. MD Caro notified of pt. Noncompliance.   "

## 2017-03-11 NOTE — PROGRESS NOTES
"Pt. Became agitated upon being awoken to replace pulse oximetry on finger. Pt. Stated "I can't believe y'all wake me up at 2 in the morning". The importance of pulse oximetry in monitoring oxygenation was explained to the patient. Pt. Refused to allow pulse oximetry to be placed. MD Caro notified of pt. Agitation and non-compliance. No additional orders given.   "

## 2017-03-11 NOTE — PLAN OF CARE
Problem: Patient Care Overview  Goal: Plan of Care Review  Outcome: Ongoing (interventions implemented as appropriate)  VSS overnight. Pt. Tachycardic at times with agitation. Heparin and dobutamine gtts infusing as ordered. Pt. Refusing care during shift (see prev. Note). MD aware. Pt. Educated on importance of VS monitoring in ICU, prescribed medications, and ordered labwork during shift. POC for continued neurologic monitoring and support reviewed and discussed with pt. And sibling. All questions and concerns were addressed. Sibling verbalized understanding.

## 2017-03-11 NOTE — PLAN OF CARE
"Problem: Patient Care Overview  Goal: Plan of Care Review  Outcome: Ongoing (interventions implemented as appropriate)     POC updated and reviewed at bedside with PT and via telephone with sisters of PT throughout day.      PT has had what seems to be uneventful shift- though he will not allow this RN to assess him or complete any vital signs or lab draws to check on his clinical wellbeing.  PT remains uncooperative despite this RN's multiple attempts throughout shift to make PT feel at ease and establish rapport or in the very least a theraputic milieu.   Phone attempts by family member of PT also made to encourage him to accept treatment. PT not receptive.   Concerns for neurological insult vs. sequelae of congenital condition in light of no Xa being drawn in over 24 hours voiced to MD in combination with PT's unwillingness for focal neuro assessment to be done by RN or charge RN (see previous notes by this user).   Upon attempting to transfer PT, PT verbalizes, "Im not going nowhere but home- its pointless for you to transfer me to a different pena.I want to talk to my doctor."  Dr. Fina koenig, called RN immediately and said he will come to the bedside.   Awaiting his arrival- still monitoring PT closely.                "

## 2017-03-11 NOTE — PROGRESS NOTES
Called Dr. Harden via spectra link to check on ETA to bedside d/t PT continued unwillingness to transfer out of unit without speaking with an MD- he stated he will be at the bedside to address PT when there is time.

## 2017-03-11 NOTE — PROGRESS NOTES
PT awake and alert, able to state name and  but upon questioning PT becomes excessively agitated and tachycardic in the 120 s so RN unable to complete focal neuro assessment (or any other assessment other than visualization) at this time although PT seems to be delerious.   PT still refusing to have BP or O2 sat taken, even intermittently. Bed alarm set.   Team aware.

## 2017-03-11 NOTE — PROGRESS NOTES
"Multiple attempts made to assess PT, and/or administer morning PO meds. PT refusing any treatment or assessment at this time and remains unwilling to have BP cuff placed on arm/SPO2 probe placed on finger to retrieve measurements. This RN also attempted to draw blood to measure in the least a Xa level since PT on continuous heparin GTT- PT refused and became combative toward RN stating, " I wont have y'all taking nothing out my neck."  I spoke with both PT's sisters who encouraged compliance from PT, and PT still refuses.  Dr. Galeano and Dr. Pavon at bedside and PT seeming calmer but still distrustful of nurse. Refusing any further interaction and asking RN to leave room because, "You dont have to do that Im going to a different pena."  Will continue to monitor.     "

## 2017-03-11 NOTE — CONSULTS
Infectious Disease Consult Note    Consulting Physician: Dr. Galeano    Reason for Consult: management recommendations      Assessment and Recommendations:    1. Leukocytosis:  chest CT with infiltrate; and this happened after event on 3/9   - broad spectrum abx x 7 days more likely   - cefepime adjusted for clearance of >30  2. Cerebral air embolism   3. MELAS  4. NICM - transplant work up in progress.    Will follow with you,  Please call with any questions,  Princess Paz MD  Beeper [phone]      Chief Complaint: elevated wbc    History of Present Illness:    Mr. Conde is a 37 yo male with history of HFr HFrEF (EF <10%) 2/2 NICM s/p AICD (implanted 12/2013), MELAS (mitochondrial encephalopathy, lactic acidosis, and stroke-like episodes), Hx of LV thrombus, Atrial Flutter, CKD, seizure disorder who [place] [facility] [place] [facility] in cardiogenic shock and consideration for heart transplant and/or advanced options.      He was admitted to [facility] on 2/28 after being found down. CT head done there showed no acute CVA or hemorrhage and an old R PCA stroke. He is followed by Dr. Ruiz with neurology at [facility]. Per vascular note he was diagnosed with MELAS in around 1999 after multiple stroke like episodes. According to Dr. Ruiz's note at [facility] on 3/2 there has not been any other significant system or organ involvement besides brain and heart. He has also not had stroke like episodes in more than 10 years. He also takes Keppra for seizures.      RENÉE: 3/3: negative for thrombus; subsequent cardioversion.   3/6: IABP removed.  3/9: seizure event and transferred to [facility]    Chest x-ray - clear  Abdominal u/s with sludge  CT head: interval development of venous air embolism within the cavernous sinuses and smaller component within the frontal sulci.  CT chest - right lower lobe opacities    Review of Symptoms:  Unable to obtain - patient complaining of being waken up at 2 am.      Past Medical History:  Past Medical  History:   Diagnosis Date    CHF (congestive heart failure)     Hypertension     Seizures        Past Surgical History:  History reviewed. No pertinent surgical history.    Family History:  History reviewed. No pertinent family history.      Social History:  Social History     Social History    Marital status: Single     Spouse name: N/A    Number of children: N/A    Years of education: N/A     Occupational History    Not on file.     Social History Main Topics    Smoking status: Never Smoker    Smokeless tobacco: Not on file    Alcohol use No    Drug use: No    Sexual activity: Not Currently     Partners: Female     Other Topics Concern    Not on file     Social History Narrative       Allergies:  Review of patient's allergies indicates:   Allergen Reactions    Aspirin     Bactrim [sulfamethoxazole-trimethoprim]     Depakote [divalproex]     Dilantin [phenytoin sodium extended]     Lorazepam     Phenobarbital     Tegretol [carbamazepine]     Thallium-201        Pertinent Medications:  Antibiotics:   Antibiotics     Start     Stop Route Frequency Ordered    03/10/17 1330  ceFEPIme in dextrose 5% 2 gram/50 mL IVPB 2 g      -- IV Daily 03/10/17 1219    03/10/17 1330  vancomycin (VANCOCIN) 1,250 mg in dextrose 5 % 250 mL IVPB      -- IV Every 24 hours (non-standard times) 03/10/17 1219          Physical Exam:  VS (24h):   Vitals:    03/11/17 0600   BP: 104/60   Pulse: 91   Resp: (!) 22     Temp:  [98 °F (36.7 °C)-98.6 °F (37 °C)]       General: Afebrile, although somewhat agitated   HEENT: SHONNA. EOMI, no scleral icterus. No sinus tenderness. MMM.  Pulmonary: Non labored,clear to auscultation A/P/L. No wheezing, crackles, or rhonchi.  Cardiac: normal S1 & S2    Abdominal: Non-tender, non-distended.Bowel sounds present x 4. No appreciable hepatosplenomegaly.  Extremities: no edema    Lines:      [REMOVED]      Arterial Line 03/03/17 0501 Right Radial (Removed)   Removed 03/05/17 0700   Site  Assessment Clean;Dry;Intact;No redness;No swelling 3/5/2017  3:00 AM   Line Status Pulsatile blood flow 3/5/2017  3:00 AM   Art Line Waveform Appropriate;Square wave test performed 3/5/2017  3:00 AM   Arterial Line Interventions Zeroed and calibrated;Leveled 3/5/2017  3:00 AM   Color/Movement/Sensation Capillary refill less than 3 sec 3/5/2017  3:00 AM   Dressing Type Transparent 3/5/2017  3:00 AM   Dressing Status Biopatch in place;Clean;Dry;Intact 3/5/2017  3:00 AM   Dressing Intervention New dressing 3/4/2017  3:45 AM   Dressing Change Due 03/07/17 3/5/2017  3:00 AM   Number of days:2               [REMOVED]      IABP 03/03/17 0453 03/06/17 (Removed)   Removed 03/06/17 1230   Site Assessment Clean;Dry;Intact;No redness;No swelling 3/6/2017 12:00 PM   Helium Tubing Clear 3/6/2017 12:00 PM   Arterial Line Status Arterial fluids per protocol 3/6/2017 12:00 PM   Arterial Line Interventions Zeroed and calibrated;Leveled 3/6/2017 12:00 PM   Positioning HOB up 15 degrees 3/6/2017 12:00 PM   Dressing Occlusive 3/6/2017 12:00 PM   Dressing Status Clean;Dry;Intact 3/6/2017 12:00 PM   Dressing Intervention Dressing changed 3/5/2017  6:00 AM   Dressing Change Due 03/06/17 3/6/2017 12:00 PM   Color/Movement/Sensation Capillary refill less than 3 sec 3/6/2017 12:00 PM   Number of days:3         Labs:  CBC:   Lab Results   Component Value Date    WBC 17.26 (H) 03/10/2017    WBC 24.22 (H) 03/10/2017    WBC 11.32 03/09/2017    WBC 14.53 (H) 03/08/2017    WBC 14.01 (H) 03/07/2017    HGB 12.1 (L) 03/10/2017    HCT 35.2 (L) 03/10/2017    MCV 88 03/10/2017     03/10/2017       BMP: No results for input(s): GLU, NA, K, CL, CO2, BUN, CREATININE, CALCIUM, MG in the last 24 hours.    LFT:   Lab Results   Component Value Date     (H) 03/10/2017     (H) 03/10/2017    ALKPHOS 171 (H) 03/10/2017    BILITOT 2.8 (H) 03/10/2017         Microbiology x 7d:   Microbiology Results (last 7 days)     Procedure Component Value  Units Date/Time    Urine culture [[id number]] Collected:  03/10/17 2023    Order Status:  Sent Specimen:  Urine from Urine, Clean Catch Updated:  03/10/17 2148    Blood culture [[id number]] Collected:  03/10/17 0824    Order Status:  Completed Specimen:  Blood from Peripheral, Hand, Right Updated:  03/10/17 1915     Blood Culture, Routine No Growth to date    Blood culture [[id number]] Collected:  03/10/17 0842    Order Status:  Completed Specimen:  Blood from Peripheral, Antecubital, Right Updated:  03/10/17 1915     Blood Culture, Routine No Growth to date

## 2017-03-11 NOTE — PROGRESS NOTES
Spoke w Dr. Pavon regarding PT's sisters plan to visit PT when she can in an attempt to facilitate theraputic communication between healthcare team and PT and her desire to speak with a member of the team when she arrives. Will notify team when she arrives. Monitoing closely.

## 2017-03-12 PROBLEM — R57.0 CARDIOGENIC SHOCK: Status: ACTIVE | Noted: 2017-03-12

## 2017-03-12 LAB
BACTERIA UR CULT: NO GROWTH
FACT X PPP CHRO-ACNC: 0.4 IU/ML

## 2017-03-12 PROCEDURE — 63600175 PHARM REV CODE 636 W HCPCS: Performed by: INTERNAL MEDICINE

## 2017-03-12 PROCEDURE — 85520 HEPARIN ASSAY: CPT

## 2017-03-12 PROCEDURE — 25000003 PHARM REV CODE 250: Performed by: INTERNAL MEDICINE

## 2017-03-12 PROCEDURE — 20600001 HC STEP DOWN PRIVATE ROOM

## 2017-03-12 PROCEDURE — 99232 SBSQ HOSP IP/OBS MODERATE 35: CPT | Mod: ,,, | Performed by: INTERNAL MEDICINE

## 2017-03-12 PROCEDURE — 99233 SBSQ HOSP IP/OBS HIGH 50: CPT | Mod: ,,, | Performed by: INTERNAL MEDICINE

## 2017-03-12 PROCEDURE — 63600175 PHARM REV CODE 636 W HCPCS

## 2017-03-12 PROCEDURE — 25000003 PHARM REV CODE 250: Performed by: PHYSICIAN ASSISTANT

## 2017-03-12 RX ORDER — MIRTAZAPINE 7.5 MG/1
7.5 TABLET, FILM COATED ORAL NIGHTLY
Status: DISCONTINUED | OUTPATIENT
Start: 2017-03-12 | End: 2017-03-24 | Stop reason: HOSPADM

## 2017-03-12 RX ORDER — WARFARIN 7.5 MG/1
7.5 TABLET ORAL DAILY
Status: DISCONTINUED | OUTPATIENT
Start: 2017-03-12 | End: 2017-03-13

## 2017-03-12 RX ADMIN — CEFEPIME HYDROCHLORIDE 2 G: 2 INJECTION, POWDER, FOR SOLUTION INTRAVENOUS at 07:03

## 2017-03-12 RX ADMIN — ISOSORBIDE DINITRATE 10 MG: 10 TABLET ORAL at 01:03

## 2017-03-12 RX ADMIN — Medication 400 MG: at 07:03

## 2017-03-12 RX ADMIN — LEVETIRACETAM 750 MG: 750 TABLET, FILM COATED ORAL at 07:03

## 2017-03-12 RX ADMIN — HEPARIN SODIUM AND DEXTROSE 16 UNITS/KG/HR: 10000; 5 INJECTION INTRAVENOUS at 12:03

## 2017-03-12 RX ADMIN — CEFEPIME HYDROCHLORIDE 2 G: 2 INJECTION, POWDER, FOR SOLUTION INTRAVENOUS at 09:03

## 2017-03-12 RX ADMIN — HYDRALAZINE HYDROCHLORIDE 25 MG: 25 TABLET, FILM COATED ORAL at 01:03

## 2017-03-12 RX ADMIN — LEVETIRACETAM 750 MG: 750 TABLET, FILM COATED ORAL at 08:03

## 2017-03-12 RX ADMIN — HYDRALAZINE HYDROCHLORIDE 25 MG: 25 TABLET, FILM COATED ORAL at 08:03

## 2017-03-12 RX ADMIN — DOBUTAMINE IN DEXTROSE 2.5 MCG/KG/MIN: 400 INJECTION, SOLUTION INTRAVENOUS at 06:03

## 2017-03-12 RX ADMIN — Medication 3 ML: at 06:03

## 2017-03-12 RX ADMIN — WARFARIN SODIUM 7.5 MG: 7.5 TABLET ORAL at 04:03

## 2017-03-12 RX ADMIN — Medication 3 ML: at 10:03

## 2017-03-12 RX ADMIN — HEPARIN SODIUM AND DEXTROSE 16 UNITS/KG/HR: 10000; 5 INJECTION INTRAVENOUS at 06:03

## 2017-03-12 RX ADMIN — ISOSORBIDE DINITRATE 10 MG: 10 TABLET ORAL at 08:03

## 2017-03-12 RX ADMIN — VANCOMYCIN HYDROCHLORIDE 1250 MG: 1 INJECTION, POWDER, LYOPHILIZED, FOR SOLUTION INTRAVENOUS at 12:03

## 2017-03-12 RX ADMIN — STANDARDIZED SENNA CONCENTRATE AND DOCUSATE SODIUM 2 TABLET: 8.6; 5 TABLET, FILM COATED ORAL at 07:03

## 2017-03-12 RX ADMIN — Medication 3 ML: at 01:03

## 2017-03-12 RX ADMIN — MIRTAZAPINE 7.5 MG: 7.5 TABLET ORAL at 08:03

## 2017-03-12 NOTE — PROGRESS NOTES
Infectious Disease Consult Note    Assessment and Recommendations:    1. Leukocytosis:  chest CT with infiltrate; and this happened after event on 3/9   - much improved at 13K from 24K   - broad spectrum abx to complete 7 days   - cefepime adjusted for clearance of >30; creatinine improved.  2. Cerebral air embolism   3. MELAS  4. NICM - transplant work up in progress.    Will sign off -   Please call with any questions,  Princess Paz MD  Beeper [phone]    Subjective:  Little change - same and looks comfortable.     History of Present Illness:    Mr. Conde is a 35 yo male with history of HFr HFrEF (EF <10%) 2/2 NICM s/p AICD (implanted 12/2013), MELAS (mitochondrial encephalopathy, lactic acidosis, and stroke-like episodes), Hx of LV thrombus, Atrial Flutter, CKD, seizure disorder who was transferred to [facility] in cardiogenic shock and consideration for heart transplant and/or advanced options.      He was admitted to [facility] on 2/28 after being found down. CT head done there showed no acute CVA or hemorrhage and an old R PCA stroke. He is followed by Dr. Ruiz with neurology at [facility]. Per vascular note he was diagnosed with MELAS in around 1999 after multiple stroke like episodes. According to Dr. Ruiz's note at [facility] on 3/2 there has not been any other significant system or organ involvement besides brain and heart. He has also not had stroke like episodes in more than 10 years. He also takes Keppra for seizures.      RENÉE: 3/3: negative for thrombus; subsequent cardioversion.   3/6: IABP removed.  3/9: seizure event and transferred to [facility]    Chest x-ray - clear  Abdominal u/s with sludge  CT head: interval development of venous air embolism within the cavernous sinuses and smaller component within the frontal sulci.  CT chest - right lower lobe opacities    Review of Symptoms:  Unable to obtain - patient complaining of being waken up at 2 am.      Past Medical History:  Past Medical History:    Diagnosis Date    CHF (congestive heart failure)     Hypertension     Seizures        Past Surgical History:  History reviewed. No pertinent surgical history.    Family History:  History reviewed. No pertinent family history.      Social History:  Social History     Social History    Marital status: Single     Spouse name: N/A    Number of children: N/A    Years of education: N/A     Occupational History    Not on file.     Social History Main Topics    Smoking status: Never Smoker    Smokeless tobacco: Not on file    Alcohol use No    Drug use: No    Sexual activity: Not Currently     Partners: Female     Other Topics Concern    Not on file     Social History Narrative       Allergies:  Review of patient's allergies indicates:   Allergen Reactions    Aspirin     Bactrim [sulfamethoxazole-trimethoprim]     Depakote [divalproex]     Dilantin [phenytoin sodium extended]     Lorazepam     Phenobarbital     Tegretol [carbamazepine]     Thallium-201        Pertinent Medications:  Antibiotics:   Antibiotics     Start     Stop Route Frequency Ordered    03/10/17 1330  vancomycin (VANCOCIN) 1,250 mg in dextrose 5 % 250 mL IVPB      -- IV Every 24 hours (non-standard times) 03/10/17 1219    03/11/17 2011  ceFEPIme in dextrose 5% 2 gram/50 mL IVPB 2 g      -- IV Every 12 hours (non-standard times) 03/11/17 1511          Physical Exam:  VS (24h):   Vitals:    03/12/17 1200   BP: 107/71   Pulse: 105   Resp: 20   Temp:      Temp:  [98.1 °F (36.7 °C)]       General: Afebrile, and much calmer today  HEENT: SHONNA. EOMI, no scleral icterus. No sinus tenderness. MMM.  Pulmonary: Non labored,clear to auscultation A/P/L. No wheezing, crackles, or rhonchi.  Cardiac: normal S1 & S2    Abdominal: Non-tender, non-distended.Bowel sounds present x 4. No appreciable hepatosplenomegaly.  Extremities: no edema    Lines:      [REMOVED]      Arterial Line 03/03/17 0501 Right Radial (Removed)   Removed 03/05/17 0700   Site  Assessment Clean;Dry;Intact;No redness;No swelling 3/5/2017  3:00 AM   Line Status Pulsatile blood flow 3/5/2017  3:00 AM   Art Line Waveform Appropriate;Square wave test performed 3/5/2017  3:00 AM   Arterial Line Interventions Zeroed and calibrated;Leveled 3/5/2017  3:00 AM   Color/Movement/Sensation Capillary refill less than 3 sec 3/5/2017  3:00 AM   Dressing Type Transparent 3/5/2017  3:00 AM   Dressing Status Biopatch in place;Clean;Dry;Intact 3/5/2017  3:00 AM   Dressing Intervention New dressing 3/4/2017  3:45 AM   Dressing Change Due 03/07/17 3/5/2017  3:00 AM   Number of days:2               [REMOVED]      IABP 03/03/17 0453 03/06/17 (Removed)   Removed 03/06/17 1230   Site Assessment Clean;Dry;Intact;No redness;No swelling 3/6/2017 12:00 PM   Helium Tubing Clear 3/6/2017 12:00 PM   Arterial Line Status Arterial fluids per protocol 3/6/2017 12:00 PM   Arterial Line Interventions Zeroed and calibrated;Leveled 3/6/2017 12:00 PM   Positioning HOB up 15 degrees 3/6/2017 12:00 PM   Dressing Occlusive 3/6/2017 12:00 PM   Dressing Status Clean;Dry;Intact 3/6/2017 12:00 PM   Dressing Intervention Dressing changed 3/5/2017  6:00 AM   Dressing Change Due 03/06/17 3/6/2017 12:00 PM   Color/Movement/Sensation Capillary refill less than 3 sec 3/6/2017 12:00 PM   Number of days:3         Labs:  CBC:   Lab Results   Component Value Date    WBC 13.26 (H) 03/11/2017    WBC 17.26 (H) 03/10/2017    WBC 24.22 (H) 03/10/2017    WBC 11.32 03/09/2017    WBC 14.53 (H) 03/08/2017    HGB 12.0 (L) 03/11/2017    HCT 36.0 (L) 03/11/2017    MCV 90 03/11/2017     03/11/2017       BMP:     Recent Labs  Lab 03/11/17  2255   *   *   K 4.8   CL 95   CO2 21*   BUN 42*   CREATININE 2.0*   CALCIUM 9.6   MG 2.0       LFT:   Lab Results   Component Value Date    ALT 73 (H) 03/11/2017     (H) 03/11/2017    ALKPHOS 163 (H) 03/11/2017    BILITOT 2.0 (H) 03/11/2017         Microbiology x 7d:   Microbiology Results (last 7  days)     Procedure Component Value Units Date/Time    Blood culture [[id number]] Collected:  03/10/17 0824    Order Status:  Completed Specimen:  Blood from Peripheral, Hand, Right Updated:  03/12/17 1212     Blood Culture, Routine No Growth to date     Blood Culture, Routine No Growth to date     Blood Culture, Routine No Growth to date    Blood culture [[id number]] Collected:  03/10/17 0842    Order Status:  Completed Specimen:  Blood from Peripheral, Antecubital, Right Updated:  03/12/17 1212     Blood Culture, Routine No Growth to date     Blood Culture, Routine No Growth to date     Blood Culture, Routine No Growth to date    Urine culture [[id number]] Collected:  03/10/17 2023    Order Status:  Completed Specimen:  Urine from Urine, Clean Catch Updated:  03/12/17 0536     Urine Culture, Routine No growth

## 2017-03-12 NOTE — PLAN OF CARE
Problem: Patient Care Overview  Goal: Plan of Care Review  Outcome: Ongoing (interventions implemented as appropriate)  Pt refused most of his care through the night. Was able to do one mini assessment at 2300 with blood draw. Pt took medications when sisters and  present. Able to do very minimal care due to patient's refusal and attitude towards nursing care. POC reviewed with pt needing reinforcement. Sisters also updated on care when at bedside. TM.

## 2017-03-12 NOTE — PROGRESS NOTES
Upon coming on shift previous RN and Dr. Harden at bedside due to pt refusing care. Pt is aware of circumstances and oriented. Pt sister contacted to come to hospital to aid in support of the pt. Attempt to create rapport with patient but withdrawn and refusing all care including blood pressure, pulse ox, assessment and medication administration. Telemetry on. Infusions of heparin and dobutamine are actively infusing. Pt will not allow blood draws to access Anti-XA. Central line visibly not intact or secured patient will not allow me to change dressing or secure the lines. Heparin and dobutamine lines due to be changed, pt will not allow me to change them. I offered support to pt with negative response from pt. Will continue to visually monitor pt and inform Dr. Harden of any changes.

## 2017-03-12 NOTE — PROGRESS NOTES
Progress Note  HTS    Admit Date: 3/3/2017   LOS: 9 days     SUBJECTIVE:     Patient seen and examined. Refused labs, vitals and basic care until family arrived. Refused transfer. More appropriate this AM    Scheduled Meds:   ceFEPime (MAXIPIME) IVPB  2 g Intravenous Q12H    coenzyme Q10  400 mg Oral Daily    hydrALAZINE  25 mg Oral Q8H    isosorbide dinitrate  10 mg Oral TID    levetiracetam  750 mg Oral BID    levocarnitine  330 mg Oral BID    senna-docusate 8.6-50 mg  2 tablet Oral BID    sodium chloride 0.9%  250 mL Intravenous Once    sodium chloride 0.9%  3 mL Intravenous Q8H    vancomycin (VANCOCIN) IVPB  20 mg/kg (Dosing Weight) Intravenous Q24H    warfarin  7.5 mg Oral Daily     Continuous Infusions:   DOBUTamine 2.5 mcg/kg/min (03/12/17 0900)    heparin (porcine) in D5W 16 Units/kg/hr (03/12/17 0900)     PRN Meds:alprazolam, heparin (PORCINE), heparin (PORCINE)    Review of patient's allergies indicates:   Allergen Reactions    Aspirin     Bactrim [sulfamethoxazole-trimethoprim]     Depakote [divalproex]     Dilantin [phenytoin sodium extended]     Lorazepam     Phenobarbital     Tegretol [carbamazepine]     Thallium-201        OBJECTIVE:     Vital Signs (Most Recent)  Temp: 98.1 °F (36.7 °C) (03/12/17 0701)  Pulse: 100 (03/12/17 0900)  Resp: (!) 28 (03/12/17 0900)  BP: 97/66 (03/12/17 0900)  SpO2:  (pt refusing) (03/11/17 0700)    Vital Signs Range (Last 24H):  Temp:  [98.1 °F (36.7 °C)]   Pulse:  []   Resp:  [16-28]   BP: ()/(57-77)     I & O (Last 24H):    Intake/Output Summary (Last 24 hours) at 03/12/17 0940  Last data filed at 03/12/17 0900   Gross per 24 hour   Intake            517.7 ml   Output              150 ml   Net            367.7 ml       Physical Exam:   General: Patient in no acute distress or discomfort  HEENT: No JVD, moist mucous membranes  Cardiac: S1S2 RRR  Chest: CTABL, no wheezing or rales  Abd:Soft NTND  Ext: No Edema No swelling  Neuro: A and O X  3, non focal    LABS  CBC with Diff:     Recent Labs  Lab 03/10/17  0507 03/10/17  0744 03/11/17  2255   WBC 24.22* 17.26* 13.26*   HGB 12.8* 12.1* 12.0*   HCT 37.7* 35.2* 36.0*    159 187   LYMPH 7.9*  1.9 8.7*  1.5 15.5*  2.1   MONO 6.4  1.6* 9.4  1.6* 11.2  1.5*   EOSINOPHIL 0.2 0.7 1.3       COAG:    Recent Labs  Lab 03/09/17  0442 03/10/17  0507 03/11/17  2255   INR 1.1 1.1 1.1       CMP:     Recent Labs  Lab 03/09/17  0442 03/10/17  0507 03/11/17  2255   * 141* 147*   CALCIUM 9.5 9.2 9.6   ALBUMIN 3.2* 3.0* 3.0*   PROT 7.3 6.9 7.0   * 130* 129*   K 5.0 4.8 4.8   CO2 27 25 21*   CL 89* 91* 95   BUN 71* 68* 42*   CREATININE 2.4* 2.4* 2.0*   ALKPHOS 193* 171* 163*   * 117* 73*   AST 51* 357* 105*   BILITOT 2.8* 2.8* 2.0*   MG 2.3 2.4 2.0   PHOS 4.4 3.6 3.3     Estimated Creatinine Clearance: 37.3 mL/min (based on Cr of 2).    .  No results for input(s): CPK, TROPONINI, MB, BNP in the last 168 hours.    Present on Admission:   (Resolved) Cardiogenic shock   MELAS (mitochondrial encephalopathy, lactic acidosis and stroke-like episodes)   Atrial flutter   NICM (nonischemic cardiomyopathy)   Seizure disorder   CKD (chronic kidney disease) stage 4, GFR 15-29 ml/min   Thrombocytopenia   Coagulopathy   Transaminitis   Hypoalbuminemia   (Resolved) Flaccid hemiplegia affecting left nondominant side   (Resolved) Cardiogenic shock   Cognitive impairment   Cardiogenic shock      ASSESSMENT / PLAN:   37 Y/O M gentleman with PMH of HFrEF (EF <10%) 2/2 NICM s/p AICD (implanted 12/2013), MELAS (mitochondrial encephalopathy, lactic acidosis, and stroke-like episodes), Hx of LV thrombus, Atrial Flutter, CKD, seizure disorder, who is now transferred to [facility] [place] [facility] in cardiogenic shock and consideration for heart transplant and/or advanced options. IABP now out and decreasing support    -NICM now in Cardiogenic shock:  Continue  at 2.5mcg/kg/min  Restart lasix PO soon, Cr improving  off lasix  Concern for advanced options given lack of insight, poor IQ, lack of renal recovery and refusal of care  CTs completed  Hydralazine 25mg and isordil 10mg, both TID    Leukocytosis  --increased SvO2  --Cx NGTD, started vanc/cefepime 3/10, likely for 7 days  --consult ID, appreciate assistance  --no need for ICU care at this time although he had a bad experience on floor but cannot fully explain what happened that upset him  --US with sludge; CXR with no pneumonia    Cerebral Air embolism  EEG normal   Cannot do MRI 2/2 ICD  Appreciate stroke team assistance  Continue heparin    Atrial FLutter   S/p RENÉE/ DCCV currnetly in sinus  C/W Heparin for anticoagualtion  Warfarin at 7.5mg  Follow up with Dr. Reyez 4 weeks after DC    SOILA  Appreciated [facility] Neurology consult, no contraindication to proceed with evaluation for advanced options  C/W home medications Kepra, L carnitine and Co enz Q  Checking keppra level    Cardiac diet with boost  Full Code  PT/OT    Further recommendations per attending addendum; floor today as he has no needs for ICU    Sergio Pavon MD  PGY-4 [phone])  Cardiology Fellow

## 2017-03-12 NOTE — PROGRESS NOTES
"Able to change gtt tubing out however pt became very agitated during the care and refusing all AM oral medications. He shouted, "get out of my room!" Pt with RASS of 3. Confirmed safety measures and exited room.   "

## 2017-03-12 NOTE — NURSING TRANSFER
Nursing Transfer Note      3/12/2017     [facility]    [facility]    Transfer with cardiac monitoring    Transported by Howard Keenan RN    Medicines sent: Levocarnitine solution    Chart send with patient: Yes    Notified: Sister    Upon arrival to floor: Patient ambulated to bed without assistance. Personal belongings placed at bedside. Patient appeared in no distress during transfer.

## 2017-03-12 NOTE — PROGRESS NOTES
After visit from pt's sisters the pt took most of his medications and allowed for central line dressing change. The dressing was not intact but the site was clean. Dressing now clean, dry, and intact. After informing him by laying in soiled sheets with urine puts him at risk for skin breakdown and infection he allowed for sheets to also be changed. After discussion with the  she developed a rapport with him and allowed blood to be drawn. Labs sent.

## 2017-03-13 LAB
ALBUMIN SERPL BCP-MCNC: 2.9 G/DL
ALP SERPL-CCNC: 168 U/L
ALT SERPL W/O P-5'-P-CCNC: 69 U/L
ANION GAP SERPL CALC-SCNC: 12 MMOL/L
AST SERPL-CCNC: 77 U/L
BASOPHILS # BLD AUTO: 0.05 K/UL
BASOPHILS NFR BLD: 0.4 %
BILIRUB SERPL-MCNC: 1.5 MG/DL
BUN SERPL-MCNC: 34 MG/DL
CALCIUM SERPL-MCNC: 9.5 MG/DL
CHLORIDE SERPL-SCNC: 98 MMOL/L
CO2 SERPL-SCNC: 23 MMOL/L
CREAT SERPL-MCNC: 1.8 MG/DL
DIFFERENTIAL METHOD: ABNORMAL
EOSINOPHIL # BLD AUTO: 0.4 K/UL
EOSINOPHIL NFR BLD: 3 %
ERYTHROCYTE [DISTWIDTH] IN BLOOD BY AUTOMATED COUNT: 15.1 %
EST. GFR  (AFRICAN AMERICAN): 54.8 ML/MIN/1.73 M^2
EST. GFR  (NON AFRICAN AMERICAN): 47.4 ML/MIN/1.73 M^2
FACT X PPP CHRO-ACNC: 0.5 IU/ML
GLUCOSE SERPL-MCNC: 120 MG/DL
HCT VFR BLD AUTO: 36.1 %
HGB BLD-MCNC: 11.9 G/DL
INR PPP: 1
LYMPHOCYTES # BLD AUTO: 2.3 K/UL
LYMPHOCYTES NFR BLD: 17.7 %
MAGNESIUM SERPL-MCNC: 2.1 MG/DL
MCH RBC QN AUTO: 29.7 PG
MCHC RBC AUTO-ENTMCNC: 33 %
MCV RBC AUTO: 90 FL
MONOCYTES # BLD AUTO: 0.9 K/UL
MONOCYTES NFR BLD: 6.8 %
NEUTROPHILS # BLD AUTO: 9.1 K/UL
NEUTROPHILS NFR BLD: 70.2 %
PHOSPHATE SERPL-MCNC: 3.5 MG/DL
PLATELET # BLD AUTO: 204 K/UL
PMV BLD AUTO: 12.3 FL
POTASSIUM SERPL-SCNC: 4.3 MMOL/L
PROT SERPL-MCNC: 7.1 G/DL
PROTHROMBIN TIME: 11.1 SEC
RBC # BLD AUTO: 4.01 M/UL
SODIUM SERPL-SCNC: 133 MMOL/L
WBC # BLD AUTO: 12.91 K/UL

## 2017-03-13 PROCEDURE — 99232 SBSQ HOSP IP/OBS MODERATE 35: CPT | Mod: ,,, | Performed by: INTERNAL MEDICINE

## 2017-03-13 PROCEDURE — 85520 HEPARIN ASSAY: CPT

## 2017-03-13 PROCEDURE — 20600001 HC STEP DOWN PRIVATE ROOM

## 2017-03-13 PROCEDURE — 85025 COMPLETE CBC W/AUTO DIFF WBC: CPT

## 2017-03-13 PROCEDURE — 80053 COMPREHEN METABOLIC PANEL: CPT

## 2017-03-13 PROCEDURE — 85610 PROTHROMBIN TIME: CPT

## 2017-03-13 PROCEDURE — 25000003 PHARM REV CODE 250: Performed by: INTERNAL MEDICINE

## 2017-03-13 PROCEDURE — 83735 ASSAY OF MAGNESIUM: CPT

## 2017-03-13 PROCEDURE — 63600175 PHARM REV CODE 636 W HCPCS: Performed by: INTERNAL MEDICINE

## 2017-03-13 PROCEDURE — 25000003 PHARM REV CODE 250: Performed by: PHYSICIAN ASSISTANT

## 2017-03-13 PROCEDURE — 84100 ASSAY OF PHOSPHORUS: CPT

## 2017-03-13 RX ORDER — HYDRALAZINE HYDROCHLORIDE 50 MG/1
50 TABLET, FILM COATED ORAL EVERY 8 HOURS
Status: DISCONTINUED | OUTPATIENT
Start: 2017-03-13 | End: 2017-03-14

## 2017-03-13 RX ORDER — ISOSORBIDE DINITRATE 10 MG/1
20 TABLET ORAL 3 TIMES DAILY
Status: DISCONTINUED | OUTPATIENT
Start: 2017-03-13 | End: 2017-03-14

## 2017-03-13 RX ORDER — DIGOXIN 125 MCG
0.12 TABLET ORAL
Status: DISCONTINUED | OUTPATIENT
Start: 2017-03-13 | End: 2017-03-24 | Stop reason: HOSPADM

## 2017-03-13 RX ORDER — WARFARIN 10 MG/1
10 TABLET ORAL DAILY
Status: DISCONTINUED | OUTPATIENT
Start: 2017-03-13 | End: 2017-03-13

## 2017-03-13 RX ADMIN — HYDRALAZINE HYDROCHLORIDE 50 MG: 50 TABLET ORAL at 08:03

## 2017-03-13 RX ADMIN — HEPARIN SODIUM AND DEXTROSE 16 UNITS/KG/HR: 10000; 5 INJECTION INTRAVENOUS at 01:03

## 2017-03-13 RX ADMIN — Medication 3 ML: at 06:03

## 2017-03-13 RX ADMIN — Medication 3 ML: at 10:03

## 2017-03-13 RX ADMIN — CEFEPIME HYDROCHLORIDE 2 G: 2 INJECTION, POWDER, FOR SOLUTION INTRAVENOUS at 08:03

## 2017-03-13 RX ADMIN — Medication 400 MG: at 08:03

## 2017-03-13 RX ADMIN — ISOSORBIDE DINITRATE 20 MG: 10 TABLET ORAL at 08:03

## 2017-03-13 RX ADMIN — MIRTAZAPINE 7.5 MG: 7.5 TABLET ORAL at 08:03

## 2017-03-13 RX ADMIN — LEVETIRACETAM 750 MG: 750 TABLET, FILM COATED ORAL at 09:03

## 2017-03-13 RX ADMIN — ISOSORBIDE DINITRATE 10 MG: 10 TABLET ORAL at 04:03

## 2017-03-13 RX ADMIN — HYDRALAZINE HYDROCHLORIDE 25 MG: 25 TABLET, FILM COATED ORAL at 01:03

## 2017-03-13 RX ADMIN — DIGOXIN 0.12 MG: 125 TABLET ORAL at 06:03

## 2017-03-13 RX ADMIN — LEVETIRACETAM 750 MG: 750 TABLET, FILM COATED ORAL at 08:03

## 2017-03-13 RX ADMIN — VANCOMYCIN HYDROCHLORIDE 1000 MG: 1 INJECTION, POWDER, LYOPHILIZED, FOR SOLUTION INTRAVENOUS at 01:03

## 2017-03-13 RX ADMIN — ISOSORBIDE DINITRATE 10 MG: 10 TABLET ORAL at 01:03

## 2017-03-13 RX ADMIN — WARFARIN SODIUM 10 MG: 10 TABLET ORAL at 04:03

## 2017-03-13 RX ADMIN — HYDRALAZINE HYDROCHLORIDE 25 MG: 25 TABLET, FILM COATED ORAL at 04:03

## 2017-03-13 NOTE — PROGRESS NOTES
"UPDATE    SW to pt's room for update. Pt presents as aaox3 with calm and pleasant affect. Pt reports coping adequately with no needs at this time. Pt states he feels that he is "bouncing back" and should be able to d/c soon. SW providing psychosocial and counseling support, education, resources, and d/c planning as needed. SW continuing to follow and remains available.    "

## 2017-03-13 NOTE — PROGRESS NOTES
Progress Note  HTS    Admit Date: 3/3/2017   LOS: 10 days     SUBJECTIVE:     Patient seen and examined. Moved to floor this AM, no complaints. Feels well, sitting in chair.    Scheduled Meds:   ceFEPime (MAXIPIME) IVPB  2 g Intravenous Q12H    coenzyme Q10  400 mg Oral Daily    hydrALAZINE  25 mg Oral Q8H    isosorbide dinitrate  10 mg Oral TID    levetiracetam  750 mg Oral BID    levocarnitine  330 mg Oral BID    mirtazapine  7.5 mg Oral QHS    senna-docusate 8.6-50 mg  2 tablet Oral BID    sodium chloride 0.9%  250 mL Intravenous Once    sodium chloride 0.9%  3 mL Intravenous Q8H    vancomycin (VANCOCIN) IVPB  15 mg/kg (Dosing Weight) Intravenous Q24H    warfarin  10 mg Oral Daily     Continuous Infusions:   DOBUTamine 2.5 mcg/kg/min (03/12/17 1300)    heparin (porcine) in D5W 16 Units/kg/hr (03/13/17 0144)     PRN Meds:alprazolam, heparin (PORCINE), heparin (PORCINE)    Review of patient's allergies indicates:   Allergen Reactions    Aspirin     Bactrim [sulfamethoxazole-trimethoprim]     Depakote [divalproex]     Dilantin [phenytoin sodium extended]     Lorazepam     Phenobarbital     Tegretol [carbamazepine]     Thallium-201        OBJECTIVE:     Vital Signs (Most Recent)  Temp: 98.2 °F (36.8 °C) (03/13/17 1147)  Pulse: 99 (03/13/17 1147)  Resp: 18 (03/13/17 1147)  BP: 101/71 (03/13/17 1147)  SpO2: 99 % (03/13/17 1147)    Vital Signs Range (Last 24H):  Temp:  [97.6 °F (36.4 °C)-99 °F (37.2 °C)]   Pulse:  []   Resp:  [18-20]   BP: ()/(55-76)   SpO2:  [98 %-99 %]     I & O (Last 24H):    Intake/Output Summary (Last 24 hours) at 03/13/17 1305  Last data filed at 03/13/17 0600   Gross per 24 hour   Intake              540 ml   Output              400 ml   Net              140 ml       Physical Exam:   General: Patient in no acute distress or discomfort  HEENT: No JVD, moist mucous membranes  Cardiac: S1S2 RRR  Chest: CTABL, no wheezing or rales  Abd:Soft NTND  Ext: No Edema No  swelling  Neuro: A and O X 3, non focal    LABS  CBC with Diff:     Recent Labs  Lab 03/10/17  0744 03/11/17  2255 03/13/17  0503   WBC 17.26* 13.26* 12.91*   HGB 12.1* 12.0* 11.9*   HCT 35.2* 36.0* 36.1*    187 204   LYMPH 8.7*  1.5 15.5*  2.1 17.7*  2.3   MONO 9.4  1.6* 11.2  1.5* 6.8  0.9   EOSINOPHIL 0.7 1.3 3.0       COAG:    Recent Labs  Lab 03/10/17  0507 03/11/17  2255 03/13/17  0503   INR 1.1 1.1 1.0       CMP:     Recent Labs  Lab 03/10/17  0507 03/11/17 2255 03/13/17  0503   * 147* 120*   CALCIUM 9.2 9.6 9.5   ALBUMIN 3.0* 3.0* 2.9*   PROT 6.9 7.0 7.1   * 129* 133*   K 4.8 4.8 4.3   CO2 25 21* 23   CL 91* 95 98   BUN 68* 42* 34*   CREATININE 2.4* 2.0* 1.8*   ALKPHOS 171* 163* 168*   * 73* 69*   * 105* 77*   BILITOT 2.8* 2.0* 1.5*   MG 2.4 2.0 2.1   PHOS 3.6 3.3 3.5     Estimated Creatinine Clearance: 38.5 mL/min (based on Cr of 1.8).    .  No results for input(s): CPK, TROPONINI, MB, BNP in the last 168 hours.    Present on Admission:   (Resolved) Cardiogenic shock   MELAS (mitochondrial encephalopathy, lactic acidosis and stroke-like episodes)   Atrial flutter   NICM (nonischemic cardiomyopathy)   Seizure disorder   CKD (chronic kidney disease) stage 4, GFR 15-29 ml/min   Thrombocytopenia   Coagulopathy   Transaminitis   Hypoalbuminemia   (Resolved) Flaccid hemiplegia affecting left nondominant side   (Resolved) Cardiogenic shock   Cognitive impairment   Cardiogenic shock      ASSESSMENT / PLAN:   37 Y/O M gentleman with PMH of HFrEF (EF <10%) 2/2 NICM s/p AICD (implanted 12/2013), MELAS (mitochondrial encephalopathy, lactic acidosis, and stroke-like episodes), Hx of LV thrombus, Atrial Flutter, CKD, seizure disorder, who is now transferred to [facility] from [facility] in cardiogenic shock and consideration for heart transplant and/or advanced options. IABP now out and decreasing support    -NICM now in Cardiogenic shock:  Continue  at 2.5mcg/kg/min  Restart  lasix PO soon, Cr improving off lasix  Will advance pathway  CTs completed  Increase hydralazine to 50mg TID, isordil to 20mg TID    Leukocytosis  --Cx NGTD, started vanc/cefepime 3/10, likely for 7 days  --consult ID, appreciate assistance  --US with sludge; CXR with no pneumonia  --improving WBC on ABx    Cerebral Air embolism  EEG normal   Cannot do MRI 2/2 ICD  Appreciate stroke team assistance  Continue heparin  Check limited echo with bubble    Atrial FLutter   S/p RENÉE/ DCCV currnetly in sinus  C/W Heparin for anticoagualtion  Warfarin increased to 10mg 3/13  Follow up with Dr. Reyez 4 weeks after DC    SOILA  Appreciated [facility] Neurology consult, no contraindication to proceed with evaluation for advanced options  C/W home medications Kepra, L carnitine and Co enz Q  Checking keppra level    Cardiac diet with boost  Full Code  PT/OT    Further recommendations per attending addendum    Sergio Pavon MD  PGY-4 [phone])  Cardiology Fellow

## 2017-03-13 NOTE — PLAN OF CARE
Problem: Patient Care Overview  Goal: Plan of Care Review  Outcome: Ongoing (interventions implemented as appropriate)  Plan of care discussed with pt. Pt free of falls/trauma/injuries this shift.  and Heparin infusing. Cefepime given during shift. Pt very pleasant and calm throughout shift. Scheduled meds were given as ordered. VSS & NADN. Pt denies CP, SOB, or pain/discomfort at this time. All questions addressed.  Will continue to monitor.

## 2017-03-13 NOTE — PLAN OF CARE
Problem: Patient Care Overview  Goal: Plan of Care Review  Outcome: Ongoing (interventions implemented as appropriate)  Pt verbalizes no complaints. Denies CP, SOB, or other discomforts. Pt speech was clear and fluent. Pt was able to have a clear and appropriate conversation with RN.  and heparin infusing as ordered. Pt receiving cefepime q12 and vancy q24. Pt remains free of fall or injury. Pt verbalizes understanding of plan of care. Will continue to monitor.

## 2017-03-14 LAB
ALBUMIN SERPL BCP-MCNC: 2.9 G/DL
ALP SERPL-CCNC: 166 U/L
ALT SERPL W/O P-5'-P-CCNC: 67 U/L
ANION GAP SERPL CALC-SCNC: 12 MMOL/L
AST SERPL-CCNC: 70 U/L
BASOPHILS # BLD AUTO: 0.02 K/UL
BASOPHILS NFR BLD: 0.2 %
BILIRUB SERPL-MCNC: 1.1 MG/DL
BUN SERPL-MCNC: 32 MG/DL
CALCIUM SERPL-MCNC: 9.3 MG/DL
CHLORIDE SERPL-SCNC: 100 MMOL/L
CO2 SERPL-SCNC: 21 MMOL/L
CREAT SERPL-MCNC: 1.7 MG/DL
DIFFERENTIAL METHOD: ABNORMAL
EOSINOPHIL # BLD AUTO: 0.3 K/UL
EOSINOPHIL NFR BLD: 2.8 %
ERYTHROCYTE [DISTWIDTH] IN BLOOD BY AUTOMATED COUNT: 15.2 %
EST. GFR  (AFRICAN AMERICAN): 58.7 ML/MIN/1.73 M^2
EST. GFR  (NON AFRICAN AMERICAN): 50.8 ML/MIN/1.73 M^2
FACT X PPP CHRO-ACNC: 0.43 IU/ML
GLUCOSE SERPL-MCNC: 104 MG/DL
HCT VFR BLD AUTO: 34.6 %
HGB BLD-MCNC: 11.4 G/DL
INR PPP: 1.2
LYMPHOCYTES # BLD AUTO: 2 K/UL
LYMPHOCYTES NFR BLD: 17.6 %
MAGNESIUM SERPL-MCNC: 1.9 MG/DL
MCH RBC QN AUTO: 29.8 PG
MCHC RBC AUTO-ENTMCNC: 32.9 %
MCV RBC AUTO: 90 FL
MONOCYTES # BLD AUTO: 0.8 K/UL
MONOCYTES NFR BLD: 7.1 %
NEUTROPHILS # BLD AUTO: 8 K/UL
NEUTROPHILS NFR BLD: 71.1 %
PHOSPHATE SERPL-MCNC: 3.3 MG/DL
PLATELET # BLD AUTO: 213 K/UL
PMV BLD AUTO: 11.8 FL
POTASSIUM SERPL-SCNC: 4.5 MMOL/L
PROT SERPL-MCNC: 6.9 G/DL
PROTHROMBIN TIME: 12.4 SEC
RBC # BLD AUTO: 3.83 M/UL
RETIRED EF AND QEF - SEE NOTES: 9 (ref 55–65)
SODIUM SERPL-SCNC: 133 MMOL/L
WBC # BLD AUTO: 11.23 K/UL

## 2017-03-14 PROCEDURE — 99232 SBSQ HOSP IP/OBS MODERATE 35: CPT | Mod: ,,, | Performed by: INTERNAL MEDICINE

## 2017-03-14 PROCEDURE — 93307 TTE W/O DOPPLER COMPLETE: CPT | Mod: 26,,, | Performed by: INTERNAL MEDICINE

## 2017-03-14 PROCEDURE — 63600175 PHARM REV CODE 636 W HCPCS: Performed by: INTERNAL MEDICINE

## 2017-03-14 PROCEDURE — 25000003 PHARM REV CODE 250: Performed by: INTERNAL MEDICINE

## 2017-03-14 PROCEDURE — 93307 TTE W/O DOPPLER COMPLETE: CPT

## 2017-03-14 PROCEDURE — 25000003 PHARM REV CODE 250: Performed by: PHYSICIAN ASSISTANT

## 2017-03-14 PROCEDURE — 83735 ASSAY OF MAGNESIUM: CPT

## 2017-03-14 PROCEDURE — 85025 COMPLETE CBC W/AUTO DIFF WBC: CPT

## 2017-03-14 PROCEDURE — 84100 ASSAY OF PHOSPHORUS: CPT

## 2017-03-14 PROCEDURE — 85520 HEPARIN ASSAY: CPT

## 2017-03-14 PROCEDURE — 96374 THER/PROPH/DIAG INJ IV PUSH: CPT

## 2017-03-14 PROCEDURE — 85610 PROTHROMBIN TIME: CPT

## 2017-03-14 PROCEDURE — 20600001 HC STEP DOWN PRIVATE ROOM

## 2017-03-14 PROCEDURE — 80053 COMPREHEN METABOLIC PANEL: CPT

## 2017-03-14 RX ORDER — HYDRALAZINE HYDROCHLORIDE 50 MG/1
50 TABLET, FILM COATED ORAL EVERY 6 HOURS
Status: DISCONTINUED | OUTPATIENT
Start: 2017-03-15 | End: 2017-03-16

## 2017-03-14 RX ORDER — WARFARIN 7.5 MG/1
7.5 TABLET ORAL DAILY
Status: DISCONTINUED | OUTPATIENT
Start: 2017-03-14 | End: 2017-03-16

## 2017-03-14 RX ORDER — GABAPENTIN 300 MG/1
300 CAPSULE ORAL NIGHTLY
Status: DISCONTINUED | OUTPATIENT
Start: 2017-03-14 | End: 2017-03-24 | Stop reason: HOSPADM

## 2017-03-14 RX ORDER — BUMETANIDE 1 MG/1
2 TABLET ORAL 2 TIMES DAILY
Status: DISCONTINUED | OUTPATIENT
Start: 2017-03-14 | End: 2017-03-17

## 2017-03-14 RX ORDER — SPIRONOLACTONE 25 MG/1
25 TABLET ORAL DAILY
Status: DISCONTINUED | OUTPATIENT
Start: 2017-03-14 | End: 2017-03-18

## 2017-03-14 RX ORDER — ISOSORBIDE DINITRATE 10 MG/1
20 TABLET ORAL EVERY 6 HOURS
Status: DISCONTINUED | OUTPATIENT
Start: 2017-03-14 | End: 2017-03-16

## 2017-03-14 RX ADMIN — ISOSORBIDE DINITRATE 20 MG: 10 TABLET ORAL at 02:03

## 2017-03-14 RX ADMIN — Medication 3 ML: at 08:03

## 2017-03-14 RX ADMIN — ISOSORBIDE DINITRATE 20 MG: 10 TABLET ORAL at 05:03

## 2017-03-14 RX ADMIN — CEFEPIME HYDROCHLORIDE 2 G: 2 INJECTION, POWDER, FOR SOLUTION INTRAVENOUS at 09:03

## 2017-03-14 RX ADMIN — Medication 3 ML: at 06:03

## 2017-03-14 RX ADMIN — CEFEPIME HYDROCHLORIDE 2 G: 2 INJECTION, POWDER, FOR SOLUTION INTRAVENOUS at 08:03

## 2017-03-14 RX ADMIN — WARFARIN SODIUM 7.5 MG: 7.5 TABLET ORAL at 05:03

## 2017-03-14 RX ADMIN — HYDRALAZINE HYDROCHLORIDE 50 MG: 50 TABLET ORAL at 02:03

## 2017-03-14 RX ADMIN — HYDRALAZINE HYDROCHLORIDE 50 MG: 50 TABLET ORAL at 05:03

## 2017-03-14 RX ADMIN — VANCOMYCIN HYDROCHLORIDE 1000 MG: 1 INJECTION, POWDER, LYOPHILIZED, FOR SOLUTION INTRAVENOUS at 02:03

## 2017-03-14 RX ADMIN — LEVETIRACETAM 750 MG: 750 TABLET, FILM COATED ORAL at 09:03

## 2017-03-14 RX ADMIN — Medication 400 MG: at 09:03

## 2017-03-14 RX ADMIN — GABAPENTIN 300 MG: 300 CAPSULE ORAL at 08:03

## 2017-03-14 RX ADMIN — LEVETIRACETAM 750 MG: 750 TABLET, FILM COATED ORAL at 08:03

## 2017-03-14 RX ADMIN — HEPARIN SODIUM AND DEXTROSE 16 UNITS/KG/HR: 10000; 5 INJECTION INTRAVENOUS at 05:03

## 2017-03-14 NOTE — PROGRESS NOTES
Pt's sister would like day team to call her in morning to discuss about pts medications - Consuelo Sewell sister [phone]-

## 2017-03-14 NOTE — PLAN OF CARE
Problem: Patient Care Overview  Goal: Plan of Care Review  Outcome: Ongoing (interventions implemented as appropriate)  Plan of care discussed with pt. Pt free of falls/trauma/injuries this shift.  and Heparin infusing. Cefapime given per Mar.VSS & NADN. Pt denies CP, SOB, or pain/discomfort at this time. All questions addressed.  Will continue to monitor.

## 2017-03-14 NOTE — PROGRESS NOTES
Progress Note  HTS    Admit Date: 3/3/2017   LOS: 11 days     SUBJECTIVE:     Patient seen and examined. No complaints. NAEO    Scheduled Meds:   ceFEPime (MAXIPIME) IVPB  2 g Intravenous Q12H    coenzyme Q10  400 mg Oral Daily    digoxin  0.125 mg Oral Every Mon, Wed, Fri    gabapentin  300 mg Oral QHS    [START ON 3/15/2017] hydrALAZINE  50 mg Oral Q6H    isosorbide dinitrate  20 mg Oral Q6H    levetiracetam  750 mg Oral BID    levocarnitine  330 mg Oral BID    mirtazapine  7.5 mg Oral QHS    senna-docusate 8.6-50 mg  2 tablet Oral BID    sodium chloride 0.9%  250 mL Intravenous Once    sodium chloride 0.9%  3 mL Intravenous Q8H    vancomycin (VANCOCIN) IVPB  15 mg/kg (Dosing Weight) Intravenous Q24H    warfarin  7.5 mg Oral Daily     Continuous Infusions:   DOBUTamine 2.5 mcg/kg/min (03/12/17 1300)    heparin (porcine) in D5W 16 Units/kg/hr (03/14/17 0526)     PRN Meds:alprazolam, heparin (PORCINE), heparin (PORCINE)    Review of patient's allergies indicates:   Allergen Reactions    Aspirin     Bactrim [sulfamethoxazole-trimethoprim]     Depakote [divalproex]     Dilantin [phenytoin sodium extended]     Lorazepam     Phenobarbital     Tegretol [carbamazepine]     Thallium-201        OBJECTIVE:     Vital Signs (Most Recent)  Temp: 98.1 °F (36.7 °C) (03/14/17 1405)  Pulse: 110 (03/14/17 1405)  Resp: 18 (03/14/17 1405)  BP: 97/74 (03/14/17 1405)  SpO2: 99 % (03/14/17 1405)    Vital Signs Range (Last 24H):  Temp:  [98.1 °F (36.7 °C)-98.4 °F (36.9 °C)]   Pulse:  []   Resp:  [18-20]   BP: ()/(59-74)   SpO2:  [98 %-99 %]     I & O (Last 24H):    Intake/Output Summary (Last 24 hours) at 03/14/17 1419  Last data filed at 03/14/17 1200   Gross per 24 hour   Intake              600 ml   Output             1250 ml   Net             -650 ml       Physical Exam:   General: Patient in no acute distress or discomfort  HEENT: No JVD, moist mucous membranes  Cardiac: S1S2 RRR  Chest: CTABL, no  wheezing or rales  Abd:Soft NTND  Ext: No Edema No swelling  Neuro: A and O X 3, non focal    LABS  CBC with Diff:     Recent Labs  Lab 03/11/17 2255 03/13/17  0503 03/14/17  0507   WBC 13.26* 12.91* 11.23   HGB 12.0* 11.9* 11.4*   HCT 36.0* 36.1* 34.6*    204 213   LYMPH 15.5*  2.1 17.7*  2.3 17.6*  2.0   MONO 11.2  1.5* 6.8  0.9 7.1  0.8   EOSINOPHIL 1.3 3.0 2.8       COAG:    Recent Labs  Lab 03/11/17 2255 03/13/17  0503 03/14/17  0507   INR 1.1 1.0 1.2       CMP:     Recent Labs  Lab 03/11/17 2255 03/13/17  0503 03/14/17  0507   * 120* 104   CALCIUM 9.6 9.5 9.3   ALBUMIN 3.0* 2.9* 2.9*   PROT 7.0 7.1 6.9   * 133* 133*   K 4.8 4.3 4.5   CO2 21* 23 21*   CL 95 98 100   BUN 42* 34* 32*   CREATININE 2.0* 1.8* 1.7*   ALKPHOS 163* 168* 166*   ALT 73* 69* 67*   * 77* 70*   BILITOT 2.0* 1.5* 1.1*   MG 2.0 2.1 1.9   PHOS 3.3 3.5 3.3     Estimated Creatinine Clearance: 41.2 mL/min (based on Cr of 1.7).    .  No results for input(s): CPK, TROPONINI, MB, BNP in the last 168 hours.    Present on Admission:   (Resolved) Cardiogenic shock   MELAS (mitochondrial encephalopathy, lactic acidosis and stroke-like episodes)   Atrial flutter   NICM (nonischemic cardiomyopathy)   Seizure disorder   CKD (chronic kidney disease) stage 4, GFR 15-29 ml/min   Thrombocytopenia   Coagulopathy   Transaminitis   Hypoalbuminemia   (Resolved) Flaccid hemiplegia affecting left nondominant side   (Resolved) Cardiogenic shock   Cognitive impairment   Cardiogenic shock      ASSESSMENT / PLAN:   37 Y/O M gentleman with PMH of HFrEF (EF <10%) 2/2 NICM s/p AICD (implanted 12/2013), MELAS (mitochondrial encephalopathy, lactic acidosis, and stroke-like episodes), Hx of LV thrombus, Atrial Flutter, CKD, seizure disorder, who is now transferred [place] [facility] [place] [facility] in cardiogenic shock and consideration for heart transplant and/or advanced options. IABP now out and decreasing support    -NICM now in Cardiogenic  shock:  Continue  at 2.5mcg/kg/min  Restart lasix PO soon, Cr improving off lasix  Will advance pathway  CTs completed  Increase hydralazine to 50mg QID, isordil to 20mg QID  Digoxin 0.125mg  Present tomorrow    Leukocytosis  --Cx NGTD, started vanc/cefepime 3/10, likely for 7 days  --consult ID, appreciate assistance  --US with sludge; CXR with no pneumonia  --improving WBC on ABx    Cerebral Air embolism  EEG normal   Cannot do MRI 2/2 ICD  Appreciate stroke team assistance  Continue heparin  Check limited echo with bubble    Atrial FLutter   S/p RENÉE/ DCCV currnetly in sinus  C/W Heparin for anticoagualtion  Warfarin increased to 10mg 3/13; back to 7.5mg today  Follow up with Dr. Reyez 4 weeks after DC    SOILA  Appreciated [facility] Neurology consult, no contraindication to proceed with evaluation for advanced options  C/W home medications Kepra, L carnitine and Co enz Q  Checking keppra level    Cardiac diet with boost  Full Code  PT/OT    Further recommendations per attending addendum    Sergio Pavon MD  PGY-4 [phone])  Cardiology Fellow

## 2017-03-15 PROBLEM — I48.3 TYPICAL ATRIAL FLUTTER: Status: ACTIVE | Noted: 2017-03-03

## 2017-03-15 LAB
ALBUMIN SERPL BCP-MCNC: 3.1 G/DL
ALBUMIN SERPL BCP-MCNC: 3.1 G/DL
ALP SERPL-CCNC: 167 U/L
ALP SERPL-CCNC: 167 U/L
ALT SERPL W/O P-5'-P-CCNC: 65 U/L
ALT SERPL W/O P-5'-P-CCNC: 65 U/L
ANION GAP SERPL CALC-SCNC: 13 MMOL/L
ANION GAP SERPL CALC-SCNC: 13 MMOL/L
AST SERPL-CCNC: 62 U/L
AST SERPL-CCNC: 62 U/L
BACTERIA BLD CULT: NORMAL
BACTERIA BLD CULT: NORMAL
BASOPHILS # BLD AUTO: 0.05 K/UL
BASOPHILS # BLD AUTO: 0.05 K/UL
BASOPHILS NFR BLD: 0.5 %
BASOPHILS NFR BLD: 0.5 %
BILIRUB SERPL-MCNC: 1 MG/DL
BILIRUB SERPL-MCNC: 1 MG/DL
BUN SERPL-MCNC: 31 MG/DL
BUN SERPL-MCNC: 31 MG/DL
CALCIUM SERPL-MCNC: 9.5 MG/DL
CALCIUM SERPL-MCNC: 9.5 MG/DL
CHLORIDE SERPL-SCNC: 100 MMOL/L
CHLORIDE SERPL-SCNC: 100 MMOL/L
CO2 SERPL-SCNC: 20 MMOL/L
CO2 SERPL-SCNC: 20 MMOL/L
CREAT SERPL-MCNC: 1.7 MG/DL
CREAT SERPL-MCNC: 1.7 MG/DL
DIFFERENTIAL METHOD: ABNORMAL
DIFFERENTIAL METHOD: ABNORMAL
EOSINOPHIL # BLD AUTO: 0.3 K/UL
EOSINOPHIL # BLD AUTO: 0.3 K/UL
EOSINOPHIL NFR BLD: 3 %
EOSINOPHIL NFR BLD: 3 %
ERYTHROCYTE [DISTWIDTH] IN BLOOD BY AUTOMATED COUNT: 15.3 %
ERYTHROCYTE [DISTWIDTH] IN BLOOD BY AUTOMATED COUNT: 15.3 %
EST. GFR  (AFRICAN AMERICAN): 58.7 ML/MIN/1.73 M^2
EST. GFR  (AFRICAN AMERICAN): 58.7 ML/MIN/1.73 M^2
EST. GFR  (NON AFRICAN AMERICAN): 50.8 ML/MIN/1.73 M^2
EST. GFR  (NON AFRICAN AMERICAN): 50.8 ML/MIN/1.73 M^2
FACT X PPP CHRO-ACNC: 0.56 IU/ML
FACT X PPP CHRO-ACNC: 0.56 IU/ML
GLUCOSE SERPL-MCNC: 117 MG/DL
GLUCOSE SERPL-MCNC: 117 MG/DL
HCT VFR BLD AUTO: 33.4 %
HCT VFR BLD AUTO: 33.4 %
HGB BLD-MCNC: 11.3 G/DL
HGB BLD-MCNC: 11.3 G/DL
INR PPP: 1.4
INR PPP: 1.4
LYMPHOCYTES # BLD AUTO: 2 K/UL
LYMPHOCYTES # BLD AUTO: 2 K/UL
LYMPHOCYTES NFR BLD: 18.6 %
LYMPHOCYTES NFR BLD: 18.6 %
MAGNESIUM SERPL-MCNC: 1.7 MG/DL
MAGNESIUM SERPL-MCNC: 1.7 MG/DL
MCH RBC QN AUTO: 29.7 PG
MCH RBC QN AUTO: 29.7 PG
MCHC RBC AUTO-ENTMCNC: 33.8 %
MCHC RBC AUTO-ENTMCNC: 33.8 %
MCV RBC AUTO: 88 FL
MCV RBC AUTO: 88 FL
MONOCYTES # BLD AUTO: 0.9 K/UL
MONOCYTES # BLD AUTO: 0.9 K/UL
MONOCYTES NFR BLD: 8.3 %
MONOCYTES NFR BLD: 8.3 %
NEUTROPHILS # BLD AUTO: 7.3 K/UL
NEUTROPHILS # BLD AUTO: 7.3 K/UL
NEUTROPHILS NFR BLD: 68.5 %
NEUTROPHILS NFR BLD: 68.5 %
PHOSPHATE SERPL-MCNC: 3.4 MG/DL
PHOSPHATE SERPL-MCNC: 3.4 MG/DL
PLATELET # BLD AUTO: 228 K/UL
PLATELET # BLD AUTO: 228 K/UL
PMV BLD AUTO: 11.9 FL
PMV BLD AUTO: 11.9 FL
POTASSIUM SERPL-SCNC: 4.5 MMOL/L
POTASSIUM SERPL-SCNC: 4.5 MMOL/L
PROT SERPL-MCNC: 7.1 G/DL
PROT SERPL-MCNC: 7.1 G/DL
PROTHROMBIN TIME: 14.2 SEC
PROTHROMBIN TIME: 14.2 SEC
RBC # BLD AUTO: 3.81 M/UL
RBC # BLD AUTO: 3.81 M/UL
SODIUM SERPL-SCNC: 133 MMOL/L
SODIUM SERPL-SCNC: 133 MMOL/L
VANCOMYCIN TROUGH SERPL-MCNC: 19.1 UG/ML
WBC # BLD AUTO: 10.63 K/UL
WBC # BLD AUTO: 10.63 K/UL

## 2017-03-15 PROCEDURE — 63600175 PHARM REV CODE 636 W HCPCS: Performed by: INTERNAL MEDICINE

## 2017-03-15 PROCEDURE — G8988 SELF CARE GOAL STATUS: HCPCS | Mod: CI

## 2017-03-15 PROCEDURE — 99232 SBSQ HOSP IP/OBS MODERATE 35: CPT | Mod: ,,, | Performed by: INTERNAL MEDICINE

## 2017-03-15 PROCEDURE — 85610 PROTHROMBIN TIME: CPT

## 2017-03-15 PROCEDURE — 97530 THERAPEUTIC ACTIVITIES: CPT

## 2017-03-15 PROCEDURE — G8978 MOBILITY CURRENT STATUS: HCPCS | Mod: CK

## 2017-03-15 PROCEDURE — 63600175 PHARM REV CODE 636 W HCPCS

## 2017-03-15 PROCEDURE — 97116 GAIT TRAINING THERAPY: CPT

## 2017-03-15 PROCEDURE — 97168 OT RE-EVAL EST PLAN CARE: CPT

## 2017-03-15 PROCEDURE — 97164 PT RE-EVAL EST PLAN CARE: CPT

## 2017-03-15 PROCEDURE — 84100 ASSAY OF PHOSPHORUS: CPT

## 2017-03-15 PROCEDURE — 25000003 PHARM REV CODE 250: Performed by: INTERNAL MEDICINE

## 2017-03-15 PROCEDURE — 85025 COMPLETE CBC W/AUTO DIFF WBC: CPT

## 2017-03-15 PROCEDURE — 80202 ASSAY OF VANCOMYCIN: CPT

## 2017-03-15 PROCEDURE — 83735 ASSAY OF MAGNESIUM: CPT

## 2017-03-15 PROCEDURE — 25000003 PHARM REV CODE 250: Performed by: STUDENT IN AN ORGANIZED HEALTH CARE EDUCATION/TRAINING PROGRAM

## 2017-03-15 PROCEDURE — G8979 MOBILITY GOAL STATUS: HCPCS | Mod: CJ

## 2017-03-15 PROCEDURE — 97161 PT EVAL LOW COMPLEX 20 MIN: CPT

## 2017-03-15 PROCEDURE — 85520 HEPARIN ASSAY: CPT

## 2017-03-15 PROCEDURE — 97802 MEDICAL NUTRITION INDIV IN: CPT

## 2017-03-15 PROCEDURE — G8987 SELF CARE CURRENT STATUS: HCPCS | Mod: CJ

## 2017-03-15 PROCEDURE — 36415 COLL VENOUS BLD VENIPUNCTURE: CPT

## 2017-03-15 PROCEDURE — 80053 COMPREHEN METABOLIC PANEL: CPT

## 2017-03-15 PROCEDURE — 20600001 HC STEP DOWN PRIVATE ROOM

## 2017-03-15 PROCEDURE — 25000003 PHARM REV CODE 250: Performed by: PHYSICIAN ASSISTANT

## 2017-03-15 RX ADMIN — WARFARIN SODIUM 7.5 MG: 7.5 TABLET ORAL at 04:03

## 2017-03-15 RX ADMIN — Medication 3 ML: at 02:03

## 2017-03-15 RX ADMIN — DIGOXIN 0.12 MG: 125 TABLET ORAL at 04:03

## 2017-03-15 RX ADMIN — BUMETANIDE 2 MG: 1 TABLET ORAL at 12:03

## 2017-03-15 RX ADMIN — HEPARIN SODIUM AND DEXTROSE 16 UNITS/KG/HR: 10000; 5 INJECTION INTRAVENOUS at 12:03

## 2017-03-15 RX ADMIN — CEFEPIME HYDROCHLORIDE 2 G: 2 INJECTION, POWDER, FOR SOLUTION INTRAVENOUS at 09:03

## 2017-03-15 RX ADMIN — LEVOCARNITINE 3.3 ML: 1 SOLUTION ORAL at 09:03

## 2017-03-15 RX ADMIN — BUMETANIDE 2 MG: 1 TABLET ORAL at 09:03

## 2017-03-15 RX ADMIN — Medication 3 ML: at 09:03

## 2017-03-15 RX ADMIN — SPIRONOLACTONE 25 MG: 25 TABLET, FILM COATED ORAL at 09:03

## 2017-03-15 RX ADMIN — HYDRALAZINE HYDROCHLORIDE 50 MG: 50 TABLET ORAL at 05:03

## 2017-03-15 RX ADMIN — LEVETIRACETAM 750 MG: 750 TABLET, FILM COATED ORAL at 09:03

## 2017-03-15 RX ADMIN — ISOSORBIDE DINITRATE 20 MG: 10 TABLET ORAL at 05:03

## 2017-03-15 RX ADMIN — DOBUTAMINE IN DEXTROSE 2.5 MCG/KG/MIN: 400 INJECTION, SOLUTION INTRAVENOUS at 09:03

## 2017-03-15 RX ADMIN — HEPARIN SODIUM AND DEXTROSE 16 UNITS/KG/HR: 10000; 5 INJECTION INTRAVENOUS at 08:03

## 2017-03-15 RX ADMIN — HYDRALAZINE HYDROCHLORIDE 50 MG: 50 TABLET ORAL at 11:03

## 2017-03-15 RX ADMIN — ISOSORBIDE DINITRATE 20 MG: 10 TABLET ORAL at 06:03

## 2017-03-15 RX ADMIN — SPIRONOLACTONE 25 MG: 25 TABLET, FILM COATED ORAL at 12:03

## 2017-03-15 RX ADMIN — GABAPENTIN 300 MG: 300 CAPSULE ORAL at 09:03

## 2017-03-15 RX ADMIN — HYDRALAZINE HYDROCHLORIDE 50 MG: 50 TABLET ORAL at 06:03

## 2017-03-15 RX ADMIN — Medication 400 MG: at 09:03

## 2017-03-15 RX ADMIN — MIRTAZAPINE 7.5 MG: 7.5 TABLET ORAL at 09:03

## 2017-03-15 RX ADMIN — ISOSORBIDE DINITRATE 20 MG: 10 TABLET ORAL at 11:03

## 2017-03-15 RX ADMIN — ALPRAZOLAM 0.5 MG: 0.5 TABLET ORAL at 09:03

## 2017-03-15 RX ADMIN — VANCOMYCIN HYDROCHLORIDE 1000 MG: 1 INJECTION, POWDER, LYOPHILIZED, FOR SOLUTION INTRAVENOUS at 02:03

## 2017-03-15 NOTE — PT/OT/SLP RE-EVAL
"Occupational Therapy  Re-evaluation    Binh Conde   MRN: [id number]   Admitting Diagnosis: Cardiogenic shock    OT Date of Treatment: 03/15/17   OT Start Time: 901  OT Stop Time: 924  OT Total Time (min): 23 min    Billable Minutes:  Re-eval 15  Therapeutic Activity 8    Diagnosis: Cardiogenic shock     Past Medical History:   Diagnosis Date    CHF (congestive heart failure)     Hypertension     Seizures       History reviewed. No pertinent surgical history.    Referring physician: Dr. Pavon  Date referred to OT: 3/14/17    General Precautions: Standard, fall, seizure  Orthopedic Precautions: N/A  Braces: N/A    Do you have any cultural, spiritual, Confucianism conflicts, given your current situation?: none     Patient History:  Living Environment  Lives With: alone  Living Arrangements: apartment (first floor/no concerns)  Transportation Available: family or friend will provide  Living Environment Comment: Pt was living alone in apt PTA but plans to live with sister upon D/C in 1-story house with 0 SUSY. Pt does not own any DME and reports between his 2 sisters he will have assist at home.  Equipment Currently Used at Home: none    Prior level of function:   Bed Mobility/Transfers: independent  Grooming: independent  Bathing: independent  Upper Body Dressing: independent  Lower Body Dressing: independent  Toileting: independent  Driving License: No  Occupation: Unemployed     Dominant hand: right    Subjective:  Communicated with RN prior to session.  "This is the first time I've really walked."  Chief Complaint: None stated  Patient/Family stated goals: Go home    Pain Ratin/10  Pain Rating Post-Intervention: 0/10    Objective:  Patient found with: telemetry, central line    Cognitive Exam:  Oriented to: Person, Place and Time  Follows Commands/attention: Easily distracted and Follows one-step commands  Communication: clear/fluent  Memory:  No Deficits noted  Safety awareness/insight to disability: " impaired    Physical Exam:  Postural examination/scapula alignment: No postural abnormalities identified  Skin integrity: Visible skin intact  Edema: None noted     Sensation:   Intact    Upper Extremity Range of Motion:  Right Upper Extremity: WFL  Left Upper Extremity: WFL    Upper Extremity Strength:  Right Upper Extremity: 3+/5 throughout  Left Upper Extremity: 3+/5 throughout   Strength: WFL    Fine motor coordination:   Intact    Functional Mobility:  Bed Mobility: Pt up in chair and left up in chair     Transfers:  Sit <> Stand Assistance: Contact Guard Assistance from bedside chair  Sit <> Stand Assistive Device: Rolling Walker    Functional Ambulation: ~200 ft with CGA-Min using RW; cues for safety    Activities of Daily Living:  Feeding Level of Assistance: Set-up Assistance  UE Dressing Level of Assistance: Minimum assistance to don gown around back  LE Dressing Level of Assistance: Stand by assistance to don/doff socks with increased time    Balance:   Static Sit: NORMAL: No deviations seen in posture held statically  Dynamic Sit: NORMAL: No deviations seen in posture held dynamically  Static Stand: GOOD-: Takes MODERATE challenges from all directions inconsistently  Dynamic stand: FAIR: Needs CONTACT GUARD during gait    Therapeutic Activities and Exercises:  OT/PT re-eval; white board updated; educated on OT role and POC; educated pt on home safety and general safety awareness, especially while using RW during functional mobility    AM-PAC 6 CLICK ADL  How much help from another person does this patient currently need?  1 = Unable, Total/Dependent Assistance  2 = A lot, Maximum/Moderate Assistance  3 = A little, Minimum/Contact Guard/Supervision  4 = None, Modified [place]/Independent    Putting on and taking off regular lower body clothing? : 3  Bathing (including washing, rinsing, drying)?: 3  Toileting, which includes using toilet, bedpan, or urinal? : 3  Putting on and taking off regular  "upper body clothing?: 3  Taking care of personal grooming such as brushing teeth?: 4  Eating meals?: 4  Total Score: 20    AM-PAC Raw Score CMS "G-Code Modifier Level of Impairment Assistance   6 % Total / Unable   7 - 9 CM 80 - 100% Maximal Assist   10 - 14 CL 60 - 80% Moderate Assist   15 - 19 CK 40 - 60% Moderate Assist   20 - 22 CJ 20 - 40% Minimal Assist   23 CI 1-20% SBA / CGA   24 CH 0% Independent/ Mod I       Patient left up in chair with all lines intact and call button in reach    Assessment:  Binh Conde is a 36 y.o. male with a medical diagnosis of Cardiogenic shock and presents with decreased strength, endurance, and safety awareness needed for ADLs and functional mobility. Pt would continue to benefit from OT to increase independence. Recommend HH upon D/C with 24 hr supervision.    Rehab potential is good.    Activity tolerance: Good    Discharge recommendations: Discharge Facility/Level Of Care Needs: home with home health (24 hr supervision)     Barriers to discharge: Barriers to Discharge: Decreased caregiver support    Equipment recommendations: bedside commode, bath bench, walker, rolling     GOALS:   Occupational Therapy Goals        Problem: Occupational Therapy Goal    Goal Priority Disciplines Outcome Interventions   Occupational Therapy Goal     OT, PT/OT Ongoing (interventions implemented as appropriate)    Description:  Updated Goals to be met by: 3/22/17     Patient will increase functional independence with ADLs by performing:    UE Dressing with Supervision.  LE Dressing with Supervision.  Grooming while standing at sink with Supervision.  Toileting from toilet with Supervision for hygiene and clothing management.   Toilet transfer to toilet with Supervision.  Upper extremity exercise program x10 reps per handout, with independence.    Goals to be met by: 3/16/17     Patient will increase functional independence with ADLs by performing:    UE Dressing with Supervision.  LE " Dressing with Supervision.  Grooming while standing at sink with Supervision.  Toileting from toilet with Supervision for hygiene and clothing management.   Toilet transfer to toilet with Supervision.  Upper extremity exercise program x10 reps per handout, with independence.                 PLAN:  Patient to be seen 3 x/week to address the above listed problems via self-care/home management, therapeutic activities, therapeutic exercises  Plan of Care expires: 04/15/17  Plan of Care reviewed with: patient    OT G-codes  Functional Assessment Tool Used: [facility]  Score: 20  Functional Limitation: Self care  Self Care Current Status (): CJ  Self Care Goal Status (): JOHN Amador  03/15/2017

## 2017-03-15 NOTE — PLAN OF CARE
Problem: Occupational Therapy Goal  Goal: Occupational Therapy Goal  Updated Goals to be met by: 3/22/17     Patient will increase functional independence with ADLs by performing:    UE Dressing with Supervision.  LE Dressing with Supervision.  Grooming while standing at sink with Supervision.  Toileting from toilet with Supervision for hygiene and clothing management.   Toilet transfer to toilet with Supervision.  Upper extremity exercise program x10 reps per handout, with independence.    Goals to be met by: 3/16/17     Patient will increase functional independence with ADLs by performing:    UE Dressing with Supervision.  LE Dressing with Supervision.  Grooming while standing at sink with Supervision.  Toileting from toilet with Supervision for hygiene and clothing management.   Toilet transfer to toilet with Supervision.  Upper extremity exercise program x10 reps per handout, with independence.   Outcome: Ongoing (interventions implemented as appropriate)  Re-eval; continue OT POC.

## 2017-03-15 NOTE — PLAN OF CARE
Problem: Patient Care Overview  Goal: Individualization & Mutuality  Dx: HF    Past Medical History:  No date: CHF (congestive heart failure)  No date: Hypertension  No date: Seizures    History reviewed. No pertinent surgical history.    1. Pt likes tv on sports   Outcome: Ongoing (interventions implemented as appropriate)  Pt free of falls/trauma/injuries this shift.VSS. Abx continued.  and heparin infusing, medication administered as perscribed. Blood cultures drawn and pending. Patient tolerating POC well. PT will be presented for advanced options sukhdeep. Pt verbalizes understanding of care. Pt denies any chest pain, SOB, or any other discomforts at this time. Will continue to monitor.

## 2017-03-15 NOTE — PROGRESS NOTES
Heart Failure Progress Note  Attending Physician: Landon Galeano MD  Hospital Day: 13    Subjective:   Interval History: No events reported overnight. Patient still has poor insight when discussing treatment plan and prognosis. For the past several nights patient has been agitated at times, perceiving mistreatment, consequently has been difficult with nurses and often has refused blood draws and PO medications. This has required several conversations on an almost nightly basis by myself to re-assure and placate the patient.    This morning, patient has been denied advanced options when presented. Pathway was closed in step 1 due to inability to care for himself and lack of insight.    Earlier this afternoon, patient had another R groin bleed from previous IABP site while patient was moving around. Pressure held for 10 minutes, hemostasis achieved.    Medications:   Continuous Infusions:   DOBUTamine 2.5 mcg/kg/min (03/12/17 1300)    heparin (porcine) in D5W 16 Units/kg/hr (03/15/17 0004)       Scheduled Meds:   bumetanide  2 mg Oral BID    ceFEPime (MAXIPIME) IVPB  2 g Intravenous Q12H    coenzyme Q10  400 mg Oral Daily    digoxin  0.125 mg Oral Every Mon, Wed, Fri    gabapentin  300 mg Oral QHS    hydrALAZINE  50 mg Oral Q6H    isosorbide dinitrate  20 mg Oral Q6H    levetiracetam  750 mg Oral BID    levocarnitine  330 mg Oral BID    mirtazapine  7.5 mg Oral QHS    senna-docusate 8.6-50 mg  2 tablet Oral BID    sodium chloride 0.9%  250 mL Intravenous Once    sodium chloride 0.9%  3 mL Intravenous Q8H    spironolactone  25 mg Oral Daily    vancomycin (VANCOCIN) IVPB  15 mg/kg (Dosing Weight) Intravenous Q24H    warfarin  7.5 mg Oral Daily     PRN Meds:alprazolam, heparin (PORCINE), heparin (PORCINE)  Objective:     Vitals:  Temp:  [97.8 °F (36.6 °C)-99.1 °F (37.3 °C)]   Pulse:  []   Resp:  [16-18]   BP: ()/(50-73)   SpO2:  [95 %-99 %]  I/O's:    Intake/Output Summary (Last 24  hours) at 03/15/17 1418  Last data filed at 03/15/17 1100   Gross per 24 hour   Intake             1055 ml   Output             2200 ml   Net            -1145 ml        Constitutional: NAD, conversant  HEENT: Sclera anicteric, PERRLA, EOMI  Neck: No JVD, no carotid bruits  CV: RRR, no murmur, normal S1/S2  Pulm: CTAB, no wheezes, rales, or ronchi  GI: Abdomen soft, NTND, +BS  Extremities: No LE edema, warm and well perfused  Skin: No ecchymosis, erythema, or ulcers  Psych: AOx3, appropriate affect  Neuro: CNII-XII intact, no focal deficits    Labs:       Recent Labs  Lab 03/13/17  0503 03/14/17  0507 03/15/17  0530   * 133* 133*  133*   K 4.3 4.5 4.5  4.5   CL 98 100 100  100   CO2 23 21* 20*  20*   BUN 34* 32* 31*  31*   CREATININE 1.8* 1.7* 1.7*  1.7*   * 104 117*  117*   ANIONGAP 12 12 13  13       Recent Labs  Lab 03/13/17  0503 03/14/17  0507 03/15/17  0530   AST 77* 70* 62*  62*   ALT 69* 67* 65*  65*   ALKPHOS 168* 166* 167*  167*   BILITOT 1.5* 1.1* 1.0  1.0   ALBUMIN 2.9* 2.9* 3.1*  3.1*        Recent Labs  Lab 03/13/17  0503 03/14/17  0507 03/15/17  0530   WBC 12.91* 11.23 10.63  10.63   HGB 11.9* 11.4* 11.3*  11.3*   HCT 36.1* 34.6* 33.4*  33.4*    213 228  228   GRAN 70.2  9.1* 71.1  8.0* 68.5  68.5  7.3  7.3       Recent Labs  Lab 03/13/17  0503 03/14/17  0507 03/15/17  0530   INR 1.0 1.2 1.4*  1.4*     No results for input(s): TROPONINI, BNP in the last 168 hours.   Micro:   Blood Cultures  Lab Results   Component Value Date    LABBLOO No growth after 5 days. 03/10/2017    LABBLOO No growth after 5 days. 03/10/2017     Urine Cultures  Lab Results   Component Value Date    LABURIN No growth 03/10/2017       Imaging:   TTE (3/15/2017)  TEST DESCRIPTION   Technical Quality: This is a technically adequate study.     Aorta: The aortic root is normal in size, measuring 2.1 cm at sinotubular junction and 3.1 cm at Sinuses of Valsalva. The proximal ascending aorta is  normal in size, measuring 2.5 cm across.     Left Atrium: The left atrial volume index is severely enlarged, measuring 64.37 cc/m2.     Left Ventricle: The left ventricle is normal in size, with an end-diastolic diameter of 6.7 cm, and an end-systolic diameter of 5.9 cm. LV wall thickness is normal, with the septum and the posterior wall each measuring 1.0 cm across. Relative wall thickness was normal at 0.30, and the LV mass index was increased at 219.9 g/m2 consistent with eccentric left ventricular hypertrophy. Global left ventricular systolic function appears severely depressed. Visually estimated ejection fraction is <10%. The LV Doppler derived stroke volume equals 28.0 ccs; at a measured heart rate of 103 bpm this results in a LV Doppler derived cardiac output of 2.9 L/min (CI = 1.78 L/min/m2). There is global hypokinesis.     Right Atrium: The right atrium is enlarged, measuring 6.7 cm in length and 5.3 cm in width in the apical view.     Right Ventricle: The right ventricle is enlarged measuring 5.2 cm at the base in the apical right ventricle-focused view. Global right ventricular systolic function appears low normal to mildly depressed. Tricuspid annular plane systolic excursion (TAPSE) is 2.2 cm. Tissue Doppler-derived tricuspid annular peak systolic velocity (S prime) is 12.4 cm/s.     Aortic Valve:  Aortic valve is normal in structure with normal leaflet mobility.     Mitral Valve:  Mitral valve is normal in structure with normal leaflet mobility.     Tricuspid Valve:  Tricuspid valve is normal in structure with normal leaflet mobility.     Pulmonary Valve:  Pulmonary valve is normal in structure with normal leaflet mobility.     IVC: IVC is enlarged and collapses < 50% with a sniff, suggesting high right atrial pressure of 15 mmHg.     Shunt: This study was performed in conjunction with intravenous bubble contrast. There is evidence of a right to left shunt across the atrial septum.     Intracavitary:  There is no evidence of pericardial effusion, intracavity mass, thrombi, or vegetation.     Other: If clinically indicated, a full Doppler study can be performed.     RV/LV ratio is 0.78 (RV/LV diameter ratio > 0.75 is associated with higher risk of RV failure in patients with continuous-flow LVADs).     CONCLUSIONS     1 - Eccentric hypertrophy.     2 - Severely depressed left ventricular systolic function (EF <10%).     3 - Biatrial enlargement.     4 - Right ventricular enlargement with low normal to mildly depressed systolic function.     5 - Increased central venous pressure.    EF   Date Value Ref Range Status   03/14/2017 9 (A) 55 - 65    03/03/2017 10 (A) 55 - 65    03/03/2017 10 (A) 55 - 65      Assessment:   35 y/o M with PMHx of HFrEF (EF <10%) 2/2 NICM s/p AICD (implanted 12/2013), MELAS (mitochondrial encephalopathy, lactic acidosis, and stroke-like episodes), Hx of LV thrombus, Atrial Flutter, CKD, seizure disorder, who was [place] to [facility] [place] from [facility] in cardiogenic shock and consideration for heart transplant and/or advanced options. IABP now out and decreasing support.    Patient has been denied advanced options when presented. Pathway was closed in step 1 due to inability to care for himself and lack of insight.    Plan:   NICM in Cardiogenic shock, improving, off IABP, still requiring inotrope; Net -2L last 24H, -19.6 L since admission  - Continue  @ 2.5 mcg/kg/min  - Continue Bumex 2 mg PO BID  - Continue hydralazine 50 mg QID, ISDN 20 mg QID  - Continue Aldactone 25 mg Daily  - Not a candidate for advanced options, pathway stopped at Step 1     Leukocytosis, concern for sepsis, improving to 10.6 today  - US with sludge; CXR with no pneumonia  - Blood and Urine Cx NGTD (sent 3/10/2017)  - On empiric therapy Vancomycin and Cefepime, day #6/7  - ID on consult, appreciate rec's     Cerebral Air Embolism  - CT on 3/9/2017 with venous air embolism within the cavernous sinuses and smaller component  within the frontal sulci  - EEG normal, unable to get MRI 2/2 ICD  - TTE with bubble shows R to left shunt across the atrial septum  - Stroke team on consult, appreciate rec's     Atrial Flutter, s/p RENÉE/DCCV, currently in sinus rhythm  - INR 1.4 today  - Continue heparin gtt bridge to Coumadin, currently 7.5 mg Daily  - Follow up with Dr. Reyez 4 weeks after discharge    MELAS  - Continue home medications Keppra, L-carnitine and CoQ  - Vascular neurology on consult, appreciate rec's     Diet: Cardiac diet with boost  PPx: On heparin gtt while bridging to Coumadin  PT/OT    Code Status: FULL CODE  Disposition: Will plan on home  @ 2.5 mcg/kg/min, possible d/c by end of the week    Patient seen and examined this morning. Discussed with Dr. Manzo, staff attestation to follow.    Signed:  Mekhi Caro MD  Cardiology Fellow - PGY4  Pager: [phone]  3/15/2017 2:18 PM

## 2017-03-15 NOTE — PLAN OF CARE
[facility]   Heart Transplant Clinic  [address] [place] [address]   [phone] after hours        HOME  HEALTH ORDERS      Admit to Home Health    Diagnosis:   Patient Active Problem List   Diagnosis    MELAS (mitochondrial encephalopathy, lactic acidosis and stroke-like episodes)    Atrial flutter    NICM (nonischemic cardiomyopathy)    Seizure disorder    CKD (chronic kidney disease) stage 4, GFR 15-29 ml/min    Thrombocytopenia    Coagulopathy    Transaminitis    Hypoalbuminemia    Cognitive impairment    Cardiogenic shock       Patient is homebound due to:  Inability to care for himself    Diet: Cardiac diet with Boost    Acitivities: Min A for ambulation with use of a rolling walker    Nursing:   SN to complete comprehensive assessment including routine vital signs. Instruct on disease process and s/s of complications to report to MD. Review/verify medication list sent home with the patient at time of discharge  and instruct patient/caregiver as needed. Frequency may be adjusted depending on start of care date.    Notify MD if SBP > 160 or < 90; DBP > 90 or < 50; HR > 120 or < 50; Temp > 101; Weight gain >3lbs in 1 day or 5lbs in 1 week.    LABS:  SN to perform labs: CMP, CBC, PT/INR    HOME INFUSION THERAPY:  SN to perform Infusion Therapy/Central Line Care.  Review Central Line Care & Central Line Flush with patient.    Administer (drug and dose): Dobutamine 2.5 mcg/kg/min, dosing weight 48.8kg    **For questions or concerns, please call [phone] and ask for Pre-Heart transplant clinic, M-F 8-5. After hours, weekends, call [phone] and ask for the Heart Transplant Cardiologist on call.**    Central line care:  Scrub the Hub: Prior to accessing the line, always perform a 30 second alcohol scrub  Each lumen of the central line is to be flushed at least daily with 10 mL Normal Saline and 3 mL Heparin flush (100 units/mL)  Skilled  Nurse (SN) may draw blood from IV access  Blood Draw Procedure:   - Aspirate at least 5 mL of blood   - Discard   - Obtain specimen   - Change posiflow cap   - Flush with 20 mL Normal Saline followed by a                 3-5 mL Heparin flush (100 units/mL)  Central :   - Sterile dressing changes are done weekly and as needed.   - Use chlor-hexadine scrub to cleanse site, apply Biopatch to insertion site,       apply securement device dressing   - Posi-flow caps are changed weekly and after EVERY lab draw.   - If sterile gauze is under dressing to control oozing,                 dressing change must be performed every 24 hours until gauze is not needed.    CONSULTS:  Physical Therapy to evaluate and treat. Evaluate for home safety and equipment needs; Establish/upgrade home exercise program. Perform / instruct on therapeutic exercises, gait training, transfer training, and Range of Motion.    Occupational Therapy to evaluate and treat. Evaluate home environment for safety and equipment needs. Perform/Instruct on transfers, ADL training, ROM, and therapeutic exercises.    Speech Therapy  to evaluate and treat for:  Language  Swallowing  Cognition                                              to evaluate for community resources/long-range planning.     Aide to provide assistance with personal care, ADLs, and vital signs    Send initial Home Health orders to HTS attending physician on call.  Send follow up questions to [phone] or fax:     Heart Failure:      [phone]  Patient to follow up with Dr. Maico Encarnacion at [facility]

## 2017-03-15 NOTE — PROGRESS NOTES
[facility]  Adult Nutrition  Progress Note    SUMMARY     Recommendations    Recommendation/Intervention:   1. Continue current diet order with oral supplements.   2. If PO intake does not improve, may consider appetite stimulant.   3. Encourage good PO intake of meals. Will monitor.   Goals: PO intake >50%.   Nutrition Goal Status: new  Communication of RD Recs: reviewed with RN    Reason for Assessment    Reason for Assessment: length of stay  Diagnosis: cardiac disease  Relevent Medical History: cardiogenic shock   Nutrition Discharge Planning: Adequate PO intake for optimal nutrition.     Nutrition Prescription Ordered    Current Diet Order: Cardiac, Fluid 1500mL + Boost Plus TID, Beneprotein TID     Nutrition Risk Screen     Nutrition Risk Screen: no indicators present    Nutrition/Diet History    Typical Food/Fluid Intake: Pt with varied PO intake, consuming approx 25-50% of meals.   Factors Affecting Nutritional Intake: decreased appetite    Labs/Tests/Procedures/Meds    Pertinent Labs Reviewed: reviewed  Pertinent Labs Comments: Na 133, Alb 3.1  Pertinent Medications Reviewed: reviewed  Pertinent Medications Comments: coenzyme Q10, coumadin    Physical Findings    Overall Physical Appearance: generalized wasting, loss of muscle mass, loss of subcutaneous fat, underweight  Oral/Mouth Cavity: WDL  Skin: intact    Anthropometrics    Height (inches): 72.01 in  Weight Method: Standard Scale  Weight (kg): 48.8 kg  Ideal Body Weight (IBW), Male: 178.06 lb  % Ideal Body Weight, Male (lb): 60.42   BMI (kg/m2): 14.59  BMI Grade: less than 16 protein-energy malnutrition grade III    Estimated/Assessed Needs    Weight Used For Calorie Calculations: 48.8 kg (107 lb 9.4 oz)   Height (cm): 182.9 cm  Energy Need Method: Kcal/kg (40-45: 1952-2196kcal)  Weight Used For Protein Calculations: 48.8 kg (107 lb 9.4 oz)  Protein Requirements: 73-88g (1.5-1.8g/kg)  Fluid Need Method: RDA Method (or per  MD)    Monitor and Evaluation    Food and Nutrient Intake: energy intake, food and beverage intake  Food and Nutrient Adminstration: diet order  Anthropometric Measurements: weight, weight change  Biochemical Data, Medical Tests and Procedures: other (specify) (All labs)  Nutrition-Focused Physical Findings: overall appearance    Nutrition Risk    Level of Risk: moderate (high-moderate)    Nutrition Follow-Up    RD Follow-up?: Yes    Assessment and Plan    Inadequate energy intake r/t decreased appetite AEB PO intake 25-50%, underweight BMI - new.

## 2017-03-15 NOTE — PROGRESS NOTES
UPDATE    SW confirmed pt has no preference for HH or infusion companies. SW referred pt to [facility] ph [phone] and [facility]  [phone] and confirmed both companies have access to pt's chart via Epic. SW providing psychosocial and counseling support, education, resources, and d/c planning as needed. SW continuing to follow and remains available.

## 2017-03-15 NOTE — PROGRESS NOTES
Patient found standing in room with blood squirting from right groin site. Pressure applied and patient helped to bed. Lying flat with pressure held for about 10 minutes. MD to bedside, instructed to dress site with gauze and tegaderm and for patient to lie flat for 6 hours with HOB no higher than 30 degrees. Will continue to monitor.       03/15/17 1340   Vital Signs   Pulse 104   Heart Rate Source Monitor   BP 95/64   MAP (mmHg) 76   BP Location Left arm   BP Method Automatic   Patient Position Lying

## 2017-03-15 NOTE — PROGRESS NOTES
MD Caro notified of BP below. MD orders to hold 0000 BP meds. Will carry out orders. [facility]   03/14/17 2345   Vital Signs   BP (!) 90/50   MAP (mmHg) 63   BP Location Left arm   BP Method Automatic   Patient Position Lying

## 2017-03-15 NOTE — PT/OT/SLP RE-EVAL
Physical Therapy  Re-evaluation    Binh Conde   MRN: [id number]   Admitting Diagnosis: Cardiogenic shock    PT Received On: 03/15/17  PT Start Time: 0900     PT Stop Time: 0925    PT Total Time (min): 25 min   - co-eval    Billable Minutes:  Evaluation 1 and Gait [id number]    Diagnosis: Cardiogenic shock  (return to floor from ICU)    Past Medical History:   Diagnosis Date    CHF (congestive heart failure)     Hypertension     Seizures       History reviewed. No pertinent surgical history.    Referring physician: Sergio Pavon MD  Date referred to PT: 3/14/17    General Precautions: Standard, fall  Orthopedic Precautions: N/A   Braces: N/A       Do you have any cultural, spiritual, Roman Catholic conflicts, given your current situation?: Temple    Patient History:  Lives With: alone (But will be going to live with his sisters upon discharge.)  Living Arrangements: apartment  Home Layout: Able to live on 1st floor  Transportation Available: family or friend will provide  Living Environment Comment: Pt lives alone but will be discharging to his sister's house. He has two sisters, so he will not be left alone once he discharges.  He was (I) PTA, not driving or working. Has had 2 strokes previously, one in 2001.  Unsure of bathroom situation at his sister's house (think pt did not understand the question).  Equipment Currently Used at Home: none  DME owned (not currently used): none    Previous Level of Function:  Ambulation Skills: independent  Transfer Skills: independent  ADL Skills: independent  Work/Leisure Activity: independent    Subjective:  Communicated with nurse prior to session.  Pt was agreeable to PT services. Reported that he's never had any pain.  Has not worked since his first stroke in 2001, according to patient.  He lives alone in 1 story apartment, but he will stay with his sister(s) when he discharges from the hospital.       Chief Complaint: Impaired balance  Patient goals: Be able to  ambulate (without a walker).    Pain Ratin/10      Objective:   Patient found with: telemetry   Pt does require redirection, as he is verbose.     Cognitive Exam:  Oriented to: Person, Place, Time and Situation    Follows Commands/attention: Easily distracted and Follows two-step commands  Communication: clear/fluent  Safety awareness/insight to disability: intact    Physical Exam:  Postural examination/scapula alignment: Rounded shoulder and Head forward    Skin integrity: Visible skin intact  Edema: None noted     Sensation:   Intact    Upper Extremity Range of Motion:  Right Upper Extremity: WNL  Left Upper Extremity: WNL    Upper Extremity Strength:  Right Upper Extremity: WNL  Left Upper Extremity: WNL    Lower Extremity Range of Motion:  Right Lower Extremity: WNL  Left Lower Extremity: WNL    Lower Extremity Strength:  Right Lower Extremity: WNL  Left Lower Extremity: WFL except slight weakness noted in L quadriceps (4/5)     Fine motor coordination:  Intact    Gross motor coordination: WFL    Functional Mobility:  Bed Mobility:       Transfers:  Sit <> Stand Assistance: Contact Guard Assistance  Sit <> Stand Assistive Device: No Assistive Device    Gait:   Gait Distance: 200 feet with rolling walker, minimal assistance. Forward flexed posture, narrow base of support/stride width. Pt was given verbal cues to remain close to rolling walker for safety.  Assistance 1: Minimum assistance  Gait Assistive Device: Rolling walker  Gait Pattern: 2-point gait  Gait Deviation(s): decreased benedict, increased time in double stance, decreased velocity of limb motion, foot flat (increased knee flexion)    Balance:   Static Sit: GOOD: Takes MODERATE challenges from all directions  Dynamic Sit: GOOD: Maintains balance through MODERATE excursions of active trunk movement  Static Stand: POOR+: Needs MINIMAL assist to maintain  Dynamic stand: FAIR: Needs CONTACT GUARD during gait    AM-PAC 6 CLICK MOBILITY  How much help  from another person does this patient currently need?   1 = Unable, Total/Dependent Assistance  2 = A lot, Maximum/Moderate Assistance  3 = A little, Minimum/Contact Guard/Supervision  4 = None, Modified Smoot/Independent    Turning over in bed (including adjusting bedclothes, sheets and blankets)?: 4  Sitting down on and standing up from a chair with arms (e.g., wheelchair, bedside commode, etc.): 3  Moving from lying on back to sitting on the side of the bed?: 3  Moving to and from a bed to a chair (including a wheelchair)?: 3  Need to walk in hospital room?: 3  Climbing 3-5 steps with a railing?: 3  Total Score: 19     AM-PAC Raw Score CMS G-Code Modifier Level of Impairment Assistance   6 % Total / Unable   7 - 9 CM 80 - 100% Maximal Assist   10 - 14 CL 60 - 80% Moderate Assist   15 - 19 CK 40 - 60% Moderate Assist   20 - 22 CJ 20 - 40% Minimal Assist   23 CI 1-20% SBA / CGA   24 CH 0% Independent/ Mod I     Patient left up in chair with call button in reach. White board updated with PT/OT names and level of assistance needed for ambulation.    Assessment:   Binh Conde is a 36 y.o. male with a medical diagnosis of Cardiogenic shock and presents with impaired balance, requiring Min A for ambulation with use of a rolling walker.  With further PT services, pt will improve functionally. He will benefit from home health physical therapy and assistance from his family upon return home. Low complexity evaluation due to stable clinical presentation.    Rehab identified problem list/impairments: Rehab identified problem list/impairments: weakness, impaired endurance, gait instability, impaired balance    Rehab potential is good.    Activity tolerance: Fair    Discharge recommendations: Discharge Facility/Level Of Care Needs: home health PT     Barriers to discharge: Barriers to Discharge: Decreased caregiver support    Equipment recommendations: Equipment Needed After Discharge: bedside commode,  bath bench, walker, rolling (Please assess need for bath bench vs. shower chair. )     GOALS:   Physical Therapy Goals        Problem: Physical Therapy Goal    Goal Priority Disciplines Outcome Goal Variances Interventions   Physical Therapy Goal     PT/OT, PT Ongoing (interventions implemented as appropriate)     Description:  Goals to be met by: 3/22/17 - Set at re-eval.    Patient will increase functional independence with mobility by performin. Supine to sit with [place].  2. Sit to stand transfer with [place].  3. Bed to chair transfer with Supervision (with/without use of rolling walker).  4. Gait  x 200 feet with Supervision and use of rolling walker.   5. Lower extremity exercise program x15 reps, with assistance as needed, in order to increase LE strength and (I) with functional mobility.                  PLAN:    Patient to be seen 4 x/week to address the above listed problems via gait training, therapeutic activities, therapeutic exercises, neuromuscular re-education  Plan of Care expires: 17  Plan of Care reviewed with: patient          Breanna Mirza, PT  03/15/2017

## 2017-03-15 NOTE — PLAN OF CARE
Problem: Physical Therapy Goal  Goal: Physical Therapy Goal  Goals to be met by: 3/22/17 - Set at re-eval.    Patient will increase functional independence with mobility by performin. Supine to sit with [place].  2. Sit to stand transfer with [place].  3. Bed to chair transfer with Supervision (with/without use of rolling walker).  4. Gait x 200 feet with Supervision and use of rolling walker.   5. Lower extremity exercise program x15 reps, with assistance as needed, in order to increase LE strength and (I) with functional mobility.    Outcome: Ongoing (interventions implemented as appropriate)  Goals set and revised upon re-evaluation 3/15/2017.

## 2017-03-16 ENCOUNTER — ANESTHESIA EVENT (OUTPATIENT)
Dept: SURGERY | Facility: HOSPITAL | Age: 37
DRG: 291 | End: 2017-03-16
Payer: MEDICARE

## 2017-03-16 PROBLEM — I50.23 ACUTE ON CHRONIC SYSTOLIC CONGESTIVE HEART FAILURE: Status: ACTIVE | Noted: 2017-03-16

## 2017-03-16 LAB
ALBUMIN SERPL BCP-MCNC: 3.2 G/DL
ALP SERPL-CCNC: 179 U/L
ALT SERPL W/O P-5'-P-CCNC: 67 U/L
ANION GAP SERPL CALC-SCNC: 15 MMOL/L
AST SERPL-CCNC: 61 U/L
BASOPHILS # BLD AUTO: 0.02 K/UL
BASOPHILS NFR BLD: 0.2 %
BILIRUB SERPL-MCNC: 1 MG/DL
BUN SERPL-MCNC: 32 MG/DL
CALCIUM SERPL-MCNC: 9.9 MG/DL
CHLORIDE SERPL-SCNC: 96 MMOL/L
CO2 SERPL-SCNC: 21 MMOL/L
CREAT SERPL-MCNC: 1.9 MG/DL
DIFFERENTIAL METHOD: ABNORMAL
EOSINOPHIL # BLD AUTO: 0.3 K/UL
EOSINOPHIL NFR BLD: 3.1 %
ERYTHROCYTE [DISTWIDTH] IN BLOOD BY AUTOMATED COUNT: 15.2 %
EST. GFR  (AFRICAN AMERICAN): 51.3 ML/MIN/1.73 M^2
EST. GFR  (NON AFRICAN AMERICAN): 44.4 ML/MIN/1.73 M^2
FACT X PPP CHRO-ACNC: 0.62 IU/ML
FACT X PPP CHRO-ACNC: 1.04 IU/ML
GLUCOSE SERPL-MCNC: 131 MG/DL
HCT VFR BLD AUTO: 35.2 %
HGB BLD-MCNC: 11.8 G/DL
INR PPP: 1.7
LYMPHOCYTES # BLD AUTO: 2 K/UL
LYMPHOCYTES NFR BLD: 21.1 %
MAGNESIUM SERPL-MCNC: 1.8 MG/DL
MCH RBC QN AUTO: 30.1 PG
MCHC RBC AUTO-ENTMCNC: 33.5 %
MCV RBC AUTO: 90 FL
MONOCYTES # BLD AUTO: 0.7 K/UL
MONOCYTES NFR BLD: 7.3 %
NEUTROPHILS # BLD AUTO: 6.2 K/UL
NEUTROPHILS NFR BLD: 67.1 %
PHOSPHATE SERPL-MCNC: 4.3 MG/DL
PLATELET # BLD AUTO: 219 K/UL
PMV BLD AUTO: 11.3 FL
POTASSIUM SERPL-SCNC: 4.1 MMOL/L
PROT SERPL-MCNC: 7.6 G/DL
PROTHROMBIN TIME: 17 SEC
RBC # BLD AUTO: 3.92 M/UL
SODIUM SERPL-SCNC: 132 MMOL/L
WBC # BLD AUTO: 9.22 K/UL

## 2017-03-16 PROCEDURE — 84100 ASSAY OF PHOSPHORUS: CPT

## 2017-03-16 PROCEDURE — 97116 GAIT TRAINING THERAPY: CPT

## 2017-03-16 PROCEDURE — 25000003 PHARM REV CODE 250: Performed by: PHYSICIAN ASSISTANT

## 2017-03-16 PROCEDURE — 99232 SBSQ HOSP IP/OBS MODERATE 35: CPT | Mod: ,,, | Performed by: INTERNAL MEDICINE

## 2017-03-16 PROCEDURE — 80053 COMPREHEN METABOLIC PANEL: CPT

## 2017-03-16 PROCEDURE — 25000003 PHARM REV CODE 250: Performed by: INTERNAL MEDICINE

## 2017-03-16 PROCEDURE — 36415 COLL VENOUS BLD VENIPUNCTURE: CPT

## 2017-03-16 PROCEDURE — 83735 ASSAY OF MAGNESIUM: CPT

## 2017-03-16 PROCEDURE — 85520 HEPARIN ASSAY: CPT

## 2017-03-16 PROCEDURE — 85610 PROTHROMBIN TIME: CPT

## 2017-03-16 PROCEDURE — 20600001 HC STEP DOWN PRIVATE ROOM

## 2017-03-16 PROCEDURE — 85025 COMPLETE CBC W/AUTO DIFF WBC: CPT

## 2017-03-16 PROCEDURE — 63600175 PHARM REV CODE 636 W HCPCS: Performed by: INTERNAL MEDICINE

## 2017-03-16 PROCEDURE — 85520 HEPARIN ASSAY: CPT | Mod: 91

## 2017-03-16 PROCEDURE — 97530 THERAPEUTIC ACTIVITIES: CPT

## 2017-03-16 RX ORDER — ISOSORBIDE DINITRATE 10 MG/1
20 TABLET ORAL 3 TIMES DAILY
Status: DISCONTINUED | OUTPATIENT
Start: 2017-03-16 | End: 2017-03-16

## 2017-03-16 RX ORDER — HYDRALAZINE HYDROCHLORIDE 50 MG/1
50 TABLET, FILM COATED ORAL EVERY 8 HOURS
Status: DISCONTINUED | OUTPATIENT
Start: 2017-03-16 | End: 2017-03-16

## 2017-03-16 RX ORDER — ISOSORBIDE DINITRATE 10 MG/1
20 TABLET ORAL EVERY 6 HOURS
Status: DISCONTINUED | OUTPATIENT
Start: 2017-03-16 | End: 2017-03-18

## 2017-03-16 RX ORDER — CEFAZOLIN SODIUM 2 G/50ML
2 SOLUTION INTRAVENOUS
Status: DISCONTINUED | OUTPATIENT
Start: 2017-03-16 | End: 2017-03-22

## 2017-03-16 RX ORDER — HYDRALAZINE HYDROCHLORIDE 50 MG/1
50 TABLET, FILM COATED ORAL EVERY 6 HOURS
Status: DISCONTINUED | OUTPATIENT
Start: 2017-03-16 | End: 2017-03-18

## 2017-03-16 RX ADMIN — ISOSORBIDE DINITRATE 20 MG: 10 TABLET ORAL at 05:03

## 2017-03-16 RX ADMIN — HYDRALAZINE HYDROCHLORIDE 50 MG: 50 TABLET ORAL at 11:03

## 2017-03-16 RX ADMIN — VANCOMYCIN HYDROCHLORIDE 1000 MG: 1 INJECTION, POWDER, LYOPHILIZED, FOR SOLUTION INTRAVENOUS at 12:03

## 2017-03-16 RX ADMIN — Medication 400 MG: at 09:03

## 2017-03-16 RX ADMIN — BUMETANIDE 2 MG: 1 TABLET ORAL at 09:03

## 2017-03-16 RX ADMIN — HYDRALAZINE HYDROCHLORIDE 50 MG: 50 TABLET ORAL at 05:03

## 2017-03-16 RX ADMIN — Medication 3 ML: at 05:03

## 2017-03-16 RX ADMIN — LEVETIRACETAM 750 MG: 750 TABLET, FILM COATED ORAL at 09:03

## 2017-03-16 RX ADMIN — CEFEPIME HYDROCHLORIDE 2 G: 2 INJECTION, POWDER, FOR SOLUTION INTRAVENOUS at 09:03

## 2017-03-16 RX ADMIN — SPIRONOLACTONE 25 MG: 25 TABLET, FILM COATED ORAL at 09:03

## 2017-03-16 RX ADMIN — GABAPENTIN 300 MG: 300 CAPSULE ORAL at 09:03

## 2017-03-16 RX ADMIN — LEVOCARNITINE 3.3 ML: 1 SOLUTION ORAL at 09:03

## 2017-03-16 RX ADMIN — MIRTAZAPINE 7.5 MG: 7.5 TABLET ORAL at 09:03

## 2017-03-16 RX ADMIN — WARFARIN SODIUM 7.5 MG: 7.5 TABLET ORAL at 04:03

## 2017-03-16 RX ADMIN — ISOSORBIDE DINITRATE 20 MG: 10 TABLET ORAL at 11:03

## 2017-03-16 NOTE — PLAN OF CARE
Problem: Patient Care Overview  Goal: Individualization & Mutuality  Dx: HF    Past Medical History:  No date: CHF (congestive heart failure)  No date: Hypertension  No date: Seizures    History reviewed. No pertinent surgical history.    1. Pt likes tv on sports   Outcome: Ongoing (interventions implemented as appropriate)  Pt free of falls/trauma/injuries this shift.VSS. Abx continued.  and heparin infusing, medication administered as perscribed. PRN xanax given for agitation.Pt has lack of insight about POC but tolerating well. Pt to be DC by the end of the week with home . Pt denies any chest pain, SOB, or any other discomforts at this time. Will continue to monitor.

## 2017-03-16 NOTE — PROGRESS NOTES
VS are shown below,  MD Pavon notified about BP. MD arana'd to give morning BP meds. PT also allowed Rn to assess R groin site. No blood present, dressing accidentally removed in sleep. Right groin redressed. Pedal pulses are +1 bilaterally. No pt complaints. No new order at this time [facility].   03/16/17 0529   Vital Signs   Temp 98.3 °F (36.8 °C)   Temp src Oral   Pulse 108   Heart Rate Source Monitor   Resp 16   SpO2 96 %   O2 Device (Oxygen Therapy) room air   BP (!) 90/59   MAP (mmHg) 71   BP Location Left arm   BP Method Automatic   Patient Position Lying

## 2017-03-16 NOTE — PROGRESS NOTES
Midnight VS are shown below, Pt refused to let RN and Charge RN Chasidyi to assess R groin site in light of low BP. Pt states he will call us if he notices the site bleeding. MD Pavon made aware. Midnight BP meds held per MD. No new order at this time [facility].   03/15/17 2341 03/15/17 2344   Vital Signs   Temp 98.6 °F (37 °C) --    Temp src Oral --    Pulse 89 --    Heart Rate Source Monitor --    Resp 16 --    SpO2 99 % --    BP (!) 85/59 (!) 88/60   MAP (mmHg) 69 --    BP Location Left arm Left arm   BP Method Automatic Manual   Patient Position Lying Sitting

## 2017-03-16 NOTE — PLAN OF CARE
Problem: Physical Therapy Goal  Goal: Physical Therapy Goal  Goals to be met by: 3/22/17 - Set at re-eval.    Patient will increase functional independence with mobility by performin. Supine to sit with [place].  2. Sit to stand transfer with [place].  3. Bed to chair transfer with Supervision (with/without use of rolling walker).  4. Gait x 200 feet with Supervision and use of rolling walker.   5. Lower extremity exercise program x15 reps, with assistance as needed, in order to increase LE strength and (I) with functional mobility.    Outcome: Ongoing (interventions implemented as appropriate)  Goals reviewed and remain appropriate. Pt progressing towards goals.     Mary Morton, PT, DPT   3/16/2017  [phone]

## 2017-03-16 NOTE — PROGRESS NOTES
Heart Failure Progress Note  Attending Physician: Landon Galeano MD  Hospital Day: 14    Subjective:   Interval History: No events reported overnight. Blood pressure medications held (hydralazine and ISDN due to low-normal B/P's at night).    Medications:   Continuous Infusions:   DOBUTamine 2.5 mcg/kg/min (03/15/17 2113)    heparin (porcine) in D5W 16 Units/kg/hr (03/15/17 2001)       Scheduled Meds:   bumetanide  2 mg Oral BID    ceFEPime (MAXIPIME) IVPB  2 g Intravenous Q12H    coenzyme Q10  400 mg Oral Daily    digoxin  0.125 mg Oral Every Mon, Wed, Fri    gabapentin  300 mg Oral QHS    hydrALAZINE  50 mg Oral Q6H    isosorbide dinitrate  20 mg Oral Q6H    levetiracetam  750 mg Oral BID    levocarnitine  330 mg Oral BID    mirtazapine  7.5 mg Oral QHS    senna-docusate 8.6-50 mg  2 tablet Oral BID    sodium chloride 0.9%  250 mL Intravenous Once    sodium chloride 0.9%  3 mL Intravenous Q8H    spironolactone  25 mg Oral Daily    vancomycin (VANCOCIN) IVPB  15 mg/kg (Dosing Weight) Intravenous Q24H    warfarin  7.5 mg Oral Daily     PRN Meds:alprazolam, heparin (PORCINE), heparin (PORCINE)  Objective:     Vitals:  Temp:  [97.4 °F (36.3 °C)-98.7 °F (37.1 °C)]   Pulse:  []   Resp:  [16-18]   BP: ()/(59-73)   SpO2:  [96 %-99 %]  I/O's:    Intake/Output Summary (Last 24 hours) at 03/16/17 1255  Last data filed at 03/16/17 0445   Gross per 24 hour   Intake              240 ml   Output             2175 ml   Net            -1935 ml        Constitutional: NAD, conversant  HEENT: Sclera anicteric, PERRLA, EOMI  Neck: No JVD, no carotid bruits  CV: RRR, no murmur, normal S1/S2  Pulm: CTAB, no wheezes, rales, or ronchi  GI: Abdomen soft, NTND, +BS  Extremities: No LE edema, warm and well perfused  Skin: No ecchymosis, erythema, or ulcers  Psych: AOx3, appropriate affect  Neuro: CNII-XII intact, no focal deficits    Labs:       Recent Labs  Lab 03/14/17  0507 03/15/17  0530 03/16/17  0574    * 133*  133* 132*   K 4.5 4.5  4.5 4.1    100  100 96   CO2 21* 20*  20* 21*   BUN 32* 31*  31* 32*   CREATININE 1.7* 1.7*  1.7* 1.9*    117*  117* 131*   ANIONGAP 12 13  13 15       Recent Labs  Lab 03/14/17  0507 03/15/17  0530 03/16/17  0542   AST 70* 62*  62* 61*   ALT 67* 65*  65* 67*   ALKPHOS 166* 167*  167* 179*   BILITOT 1.1* 1.0  1.0 1.0   ALBUMIN 2.9* 3.1*  3.1* 3.2*        Recent Labs  Lab 03/14/17  0507 03/15/17  0530 03/16/17  0542   WBC 11.23 10.63  10.63 9.22   HGB 11.4* 11.3*  11.3* 11.8*   HCT 34.6* 33.4*  33.4* 35.2*    228  228 219   GRAN 71.1  8.0* 68.5  68.5  7.3  7.3 67.1  6.2       Recent Labs  Lab 03/14/17  0507 03/15/17  0530 03/16/17  0542   INR 1.2 1.4*  1.4* 1.7*     No results for input(s): TROPONINI, BNP in the last 168 hours.   Micro:   Blood Cultures  Lab Results   Component Value Date    LABBLOO No growth after 5 days. 03/10/2017    LABBLOO No growth after 5 days. 03/10/2017     Urine Cultures  Lab Results   Component Value Date    LABURIN No growth 03/10/2017       Imaging:   TTE (3/15/2017)  TEST DESCRIPTION   Technical Quality: This is a technically adequate study.     Aorta: The aortic root is normal in size, measuring 2.1 cm at sinotubular junction and 3.1 cm at Sinuses of Valsalva. The proximal ascending aorta is normal in size, measuring 2.5 cm across.     Left Atrium: The left atrial volume index is severely enlarged, measuring 64.37 cc/m2.     Left Ventricle: The left ventricle is normal in size, with an end-diastolic diameter of 6.7 cm, and an end-systolic diameter of 5.9 cm. LV wall thickness is normal, with the septum and the posterior wall each measuring 1.0 cm across. Relative wall thickness was normal at 0.30, and the LV mass index was increased at 219.9 g/m2 consistent with eccentric left ventricular hypertrophy. Global left ventricular systolic function appears severely depressed. Visually estimated ejection  fraction is <10%. The LV Doppler derived stroke volume equals 28.0 ccs; at a measured heart rate of 103 bpm this results in a LV Doppler derived cardiac output of 2.9 L/min (CI = 1.78 L/min/m2). There is global hypokinesis.     Right Atrium: The right atrium is enlarged, measuring 6.7 cm in length and 5.3 cm in width in the apical view.     Right Ventricle: The right ventricle is enlarged measuring 5.2 cm at the base in the apical right ventricle-focused view. Global right ventricular systolic function appears low normal to mildly depressed. Tricuspid annular plane systolic excursion (TAPSE) is 2.2 cm. Tissue Doppler-derived tricuspid annular peak systolic velocity (S prime) is 12.4 cm/s.     Aortic Valve:  Aortic valve is normal in structure with normal leaflet mobility.     Mitral Valve:  Mitral valve is normal in structure with normal leaflet mobility.     Tricuspid Valve:  Tricuspid valve is normal in structure with normal leaflet mobility.     Pulmonary Valve:  Pulmonary valve is normal in structure with normal leaflet mobility.     IVC: IVC is enlarged and collapses < 50% with a sniff, suggesting high right atrial pressure of 15 mmHg.     Shunt: This study was performed in conjunction with intravenous bubble contrast. There is evidence of a right to left shunt across the atrial septum.     Intracavitary: There is no evidence of pericardial effusion, intracavity mass, thrombi, or vegetation.     Other: If clinically indicated, a full Doppler study can be performed.     RV/LV ratio is 0.78 (RV/LV diameter ratio > 0.75 is associated with higher risk of RV failure in patients with continuous-flow LVADs).     CONCLUSIONS     1 - Eccentric hypertrophy.     2 - Severely depressed left ventricular systolic function (EF <10%).     3 - Biatrial enlargement.     4 - Right ventricular enlargement with low normal to mildly depressed systolic function.     5 - Increased central venous pressure.    EF   Date Value Ref Range  Status   03/14/2017 9 (A) 55 - 65    03/03/2017 10 (A) 55 - 65    03/03/2017 10 (A) 55 - 65      Assessment:   35 y/o M with PMHx of HFrEF (EF <10%) 2/2 NICM s/p AICD (implanted 12/2013), MELAS (mitochondrial encephalopathy, lactic acidosis, and stroke-like episodes), Hx of LV thrombus, Atrial Flutter, CKD, seizure disorder, who was transferred to [facility] from [facility] in cardiogenic shock and consideration for heart transplant and/or advanced options. IABP now out, decreasing support, still on .    Patient has been denied advanced options when presented. Pathway was closed in step 1 due to inability to care for himself and lack of insight.    Plan:   HFrEF (NYHA Functional Class IV, ACC/AHA Stage D) 2/2 NICM, presented in Cardiogenic shock, improving, off IABP, still requiring inotrope; Net -1.2L last 24H, -20.8 L since admission  - Continue  @ 2.5 mcg/kg/min  - Continue Bumex 2 mg PO BID  - Continue hydralazine 50 mg and ISDN 20 mg Q6H, threshold for holding is SBP < 80  - Continue Aldactone 25 mg Daily  - Continue digoxin 125 mcg MWF  - Not a candidate for advanced options, pathway stopped at Step 1     Leukocytosis, concern for sepsis, improving to 9.2 today  - US with sludge; CXR with no pneumonia  - Blood and Urine Cx NGTD (sent 3/10/2017)  - On empiric therapy Vancomycin and Cefepime, day #7/7  - ID on consult, appreciate rec's     Cerebral Air Embolism  - CT on 3/9/2017 with venous air embolism within the cavernous sinuses and smaller component within the frontal sulci  - EEG normal, unable to get MRI 2/2 ICD  - TTE with bubble shows R to left shunt across the atrial septum  - Stroke team on consult, appreciate rec's     Atrial Flutter, s/p RENÉE/DCCV, currently in sinus rhythm  - INR 1.7 today  - Continue heparin gtt bridge to Coumadin, currently 7.5 mg Daily  - Follow up with Dr. Reyez 4 weeks after discharge    MELAS  - Continue home medications Keppra, L-carnitine and CoQ  - Vascular neurology on consult,  appreciate rec's     Diet: Cardiac diet with boost  PPx: On heparin gtt while bridging to Coumadin  PT/OT    Code Status: FULL CODE  Disposition: Possible discharge home tomorrow with home health pending therapeutic INR; will also send on home  @ 2.5 mcg/kg/min with PICC placement prior to discharge, follow up with Dr. Maico Encarnacion at [facility]    Patient seen and examined this morning. Discussed with Dr. Manzo, staff attestation to follow.    Signed:  Mekhi aCro MD  Cardiology Fellow - PGY4  Pager: [phone]  3/16/2017 1:07 PM

## 2017-03-16 NOTE — PT/OT/SLP PROGRESS
Physical Therapy  Treatment    Binh Conde   MRN: [id number]   Admitting Diagnosis: Cardiogenic shock    PT Received On: 17  PT Start Time: 1052     PT Stop Time: 1134    PT Total Time (min): 42 min       Billable Minutes:  Gait [id number] and Therapeutic Activity 23    Treatment Type: Treatment  PT/PTA: PT     PTA Visit Number: 0       General Precautions: Standard, fall, seizure  Orthopedic Precautions: N/A   Braces: N/A    Do you have any cultural, spiritual, Confucianist conflicts, given your current situation?: Gnosticism    Subjective:  Communicated with RN prior to session.  Pt agreeable to therapy.     Pain Ratin/10    Objective:   Patient found with: telemetry, peripheral IV, central line    Functional Mobility:  Bed Mobility:   Supine to Sit: Stand by Assistance  Sit to Supine: Stand by Assistance    Transfers:  Sit <> Stand Assistance: Stand By Assistance (x3 reps)  Sit <> Stand Assistive Device: Rolling Walker, No Assistive Device (RW x1 rep; no AD x2 reps)  Bed <> Chair Technique: Stand Pivot (x2 reps)  Bed <> Chair Assistance: Contact Guard Assistance  Bed <> Chair Assistive Device: No Assistive Device    Gait:   Gait Distance: 300 ft.   Assistance 1: Contact Guard Assistance, Minimum assistance (mostly CGA with intermittent Ric for balance corrections)  Gait Assistive Device: Rolling walker  Gait Pattern: swing-through gait  Gait Deviation(s): decreased benedict, decreased step length, foot flat, decreased weight-shifting ability   Lateral LOB x4 requiring Ric to correct   Max v/c for upright posture, forward gaze, RW management, and maintaining close proximity to RW   Increased difficulty navigating in open environment (hallway) with pt easily distracted, causing multiple LOB and difficulty maneuvering around obstacles.     Balance:   Static Sit: supervision  Dynamic Sit: SBA  Static Stand: SBA-CGA  Dynamic stand: CGA-Ric     Therapeutic Activities and Exercises:  Svetlana and mahendra armijo  with maxA 2* pt found with telemetry and IV lines tangled in gown.   Performed self-care activities at bathroom sink x~8 minutes with CGA-SBA for standing balance. Required min cueing to attend to task.    Discussed d/c recs for HH and RW with pt's sisters. Discussed level of assist needed during gait training and reasoning for RW rec with pt and his sisters. Pt's sisters verbalized understanding but pt needs further reinforcement.   Pt educated on the importance of OOB activity and the effects of bedrest. Pt encouraged to sit UIC for a couple of hours. Pt instructed in using of call bell and educated on the need to have staff member present for all OOB activity.   White board updated.    AM-PAC 6 CLICK MOBILITY  How much help from another person does this patient currently need?   1 = Unable, Total/Dependent Assistance  2 = A lot, Maximum/Moderate Assistance  3 = A little, Minimum/Contact Guard/Supervision  4 = None, Modified [place]/Independent    Turning over in bed (including adjusting bedclothes, sheets and blankets)?: 4  Sitting down on and standing up from a chair with arms (e.g., wheelchair, bedside commode, etc.): 3  Moving from lying on back to sitting on the side of the bed?: 3  Moving to and from a bed to a chair (including a wheelchair)?: 3  Need to walk in hospital room?: 3  Climbing 3-5 steps with a railing?: 3  Total Score: 19    AM-PAC Raw Score CMS G-Code Modifier Level of Impairment Assistance   6 % Total / Unable   7 - 9 CM 80 - 100% Maximal Assist   10 - 14 CL 60 - 80% Moderate Assist   15 - 19 CK 40 - 60% Moderate Assist   20 - 22 CJ 20 - 40% Minimal Assist   23 CI 1-20% SBA / CGA   24 CH 0% Independent/ Mod I     Patient left up in chair with all lines intact, call button in reach and pt's sisters and PCT present. PCT agreed to assist pt back to bed when he was ready to return.     Assessment:  Binh Conde is a 36 y.o. male with a medical diagnosis of Cardiogenic shock and  presents with baseline cognitive impairment with decreased understanding and insight into condition. Pt progressed ambulation distance this session but continues to require assist for balance with multiple LOB noted and cueing needed for safety and RW management. Noted difficulty navigating in open environment with pt easily distracted causing LOB and difficulty maneuvering around obstacles. Pt with poor safety awareness, requiring at least SBA-CGA for all standing tasks. Pt would continue to benefit from skilled IP PT in order to address current deficits and progress functional mobility.     Rehab identified problem list/impairments: Rehab identified problem list/impairments: weakness, gait instability, impaired endurance, impaired balance, impaired self care skills, impaired functional mobilty, impaired cognition, decreased coordination, decreased safety awareness, impaired cardiopulmonary response to activity    Rehab potential is good.    Activity tolerance: Good    Discharge recommendations: Discharge Facility/Level Of Care Needs: home health PT, home health OT (with 24-hour supervision)     Barriers to discharge: Barriers to Discharge: Decreased caregiver support    Equipment recommendations: Equipment Needed After Discharge: walker, rolling, bath bench     GOALS:   Physical Therapy Goals        Problem: Physical Therapy Goal    Goal Priority Disciplines Outcome Goal Variances Interventions   Physical Therapy Goal     PT/OT, PT Ongoing (interventions implemented as appropriate)     Description:  Goals to be met by: 3/22/17 - Set at re-eval.    Patient will increase functional independence with mobility by performin. Supine to sit with [place].  2. Sit to stand transfer with [place].  3. Bed to chair transfer with Supervision (with/without use of rolling walker).  4. Gait  x 200 feet with Supervision and use of rolling walker.   5. Lower extremity exercise program x15 reps, with assistance as  needed, in order to increase LE strength and (I) with functional mobility.                  PLAN:    Patient to be seen 4 x/week  to address the above listed problems via gait training, therapeutic activities, therapeutic exercises, neuromuscular re-education  Plan of Care expires: 04/14/17  Plan of Care reviewed with: patient, sibling        Mary Praveen, PT, DPT   3/16/2017  [phone]

## 2017-03-17 ENCOUNTER — ANESTHESIA (OUTPATIENT)
Dept: SURGERY | Facility: HOSPITAL | Age: 37
DRG: 291 | End: 2017-03-17
Payer: MEDICARE

## 2017-03-17 LAB
ALBUMIN SERPL BCP-MCNC: 2.9 G/DL
ALBUMIN SERPL BCP-MCNC: 3.2 G/DL
ALP SERPL-CCNC: 178 U/L
ALP SERPL-CCNC: 182 U/L
ALT SERPL W/O P-5'-P-CCNC: 60 U/L
ALT SERPL W/O P-5'-P-CCNC: 68 U/L
ANION GAP SERPL CALC-SCNC: 15 MMOL/L
ANION GAP SERPL CALC-SCNC: 17 MMOL/L
AST SERPL-CCNC: 54 U/L
AST SERPL-CCNC: 59 U/L
BASOPHILS # BLD AUTO: 0.04 K/UL
BASOPHILS NFR BLD: 0.4 %
BILIRUB SERPL-MCNC: 0.9 MG/DL
BILIRUB SERPL-MCNC: 1 MG/DL
BUN SERPL-MCNC: 38 MG/DL
BUN SERPL-MCNC: 39 MG/DL
CALCIUM SERPL-MCNC: 10 MG/DL
CALCIUM SERPL-MCNC: 9.4 MG/DL
CHLORIDE SERPL-SCNC: 95 MMOL/L
CHLORIDE SERPL-SCNC: 97 MMOL/L
CO2 SERPL-SCNC: 15 MMOL/L
CO2 SERPL-SCNC: 21 MMOL/L
CREAT SERPL-MCNC: 2.2 MG/DL
CREAT SERPL-MCNC: 2.7 MG/DL
DIFFERENTIAL METHOD: ABNORMAL
EOSINOPHIL # BLD AUTO: 0.2 K/UL
EOSINOPHIL NFR BLD: 1.7 %
ERYTHROCYTE [DISTWIDTH] IN BLOOD BY AUTOMATED COUNT: 15 %
EST. GFR  (AFRICAN AMERICAN): 33.5 ML/MIN/1.73 M^2
EST. GFR  (AFRICAN AMERICAN): 43 ML/MIN/1.73 M^2
EST. GFR  (NON AFRICAN AMERICAN): 29 ML/MIN/1.73 M^2
EST. GFR  (NON AFRICAN AMERICAN): 37.2 ML/MIN/1.73 M^2
FACT X PPP CHRO-ACNC: 0.27 IU/ML
FACT X PPP CHRO-ACNC: 0.49 IU/ML
GLUCOSE SERPL-MCNC: 141 MG/DL
GLUCOSE SERPL-MCNC: 289 MG/DL
HCT VFR BLD AUTO: 34.2 %
HGB BLD-MCNC: 11.9 G/DL
INR PPP: 1.7
LYMPHOCYTES # BLD AUTO: 1.1 K/UL
LYMPHOCYTES NFR BLD: 11.8 %
MAGNESIUM SERPL-MCNC: 1.8 MG/DL
MCH RBC QN AUTO: 30 PG
MCHC RBC AUTO-ENTMCNC: 34.8 %
MCV RBC AUTO: 86 FL
MONOCYTES # BLD AUTO: 1.1 K/UL
MONOCYTES NFR BLD: 12.1 %
NEUTROPHILS # BLD AUTO: 6.6 K/UL
NEUTROPHILS NFR BLD: 73.1 %
PHOSPHATE SERPL-MCNC: 4.8 MG/DL
PLATELET # BLD AUTO: 250 K/UL
PMV BLD AUTO: 11.2 FL
POTASSIUM SERPL-SCNC: 4 MMOL/L
POTASSIUM SERPL-SCNC: 4.2 MMOL/L
PROT SERPL-MCNC: 7.1 G/DL
PROT SERPL-MCNC: 7.8 G/DL
PROTHROMBIN TIME: 17.3 SEC
RBC # BLD AUTO: 3.97 M/UL
SODIUM SERPL-SCNC: 129 MMOL/L
SODIUM SERPL-SCNC: 131 MMOL/L
WBC # BLD AUTO: 8.98 K/UL

## 2017-03-17 PROCEDURE — 36000705 HC OR TIME LEV I EA ADD 15 MIN: Performed by: SURGERY

## 2017-03-17 PROCEDURE — 94761 N-INVAS EAR/PLS OXIMETRY MLT: CPT

## 2017-03-17 PROCEDURE — 93287 PERI-PX DEVICE EVAL & PRGR: CPT

## 2017-03-17 PROCEDURE — 84100 ASSAY OF PHOSPHORUS: CPT

## 2017-03-17 PROCEDURE — 25000003 PHARM REV CODE 250: Performed by: PHYSICIAN ASSISTANT

## 2017-03-17 PROCEDURE — 25000003 PHARM REV CODE 250: Performed by: INTERNAL MEDICINE

## 2017-03-17 PROCEDURE — 37000009 HC ANESTHESIA EA ADD 15 MINS: Performed by: SURGERY

## 2017-03-17 PROCEDURE — 02HV33Z INSERTION OF INFUSION DEVICE INTO SUPERIOR VENA CAVA, PERCUTANEOUS APPROACH: ICD-10-PCS | Performed by: SURGERY

## 2017-03-17 PROCEDURE — 93005 ELECTROCARDIOGRAM TRACING: CPT

## 2017-03-17 PROCEDURE — 37000008 HC ANESTHESIA 1ST 15 MINUTES: Performed by: SURGERY

## 2017-03-17 PROCEDURE — 85610 PROTHROMBIN TIME: CPT

## 2017-03-17 PROCEDURE — D9220A PRA ANESTHESIA: Mod: CRNA,,, | Performed by: REGISTERED NURSE

## 2017-03-17 PROCEDURE — 93287 PERI-PX DEVICE EVAL & PRGR: CPT | Mod: 26,,, | Performed by: INTERNAL MEDICINE

## 2017-03-17 PROCEDURE — 36620 INSERTION CATHETER ARTERY: CPT | Performed by: SURGERY

## 2017-03-17 PROCEDURE — 25000003 PHARM REV CODE 250: Performed by: SURGERY

## 2017-03-17 PROCEDURE — 63600175 PHARM REV CODE 636 W HCPCS: Performed by: REGISTERED NURSE

## 2017-03-17 PROCEDURE — D9220A PRA ANESTHESIA: Mod: ANES,,, | Performed by: ANESTHESIOLOGY

## 2017-03-17 PROCEDURE — 36000704 HC OR TIME LEV I 1ST 15 MIN: Performed by: SURGERY

## 2017-03-17 PROCEDURE — 63600175 PHARM REV CODE 636 W HCPCS: Performed by: INTERNAL MEDICINE

## 2017-03-17 PROCEDURE — 77001 FLUOROGUIDE FOR VEIN DEVICE: CPT | Mod: 26,,, | Performed by: SURGERY

## 2017-03-17 PROCEDURE — 80053 COMPREHEN METABOLIC PANEL: CPT | Mod: 91

## 2017-03-17 PROCEDURE — 83735 ASSAY OF MAGNESIUM: CPT

## 2017-03-17 PROCEDURE — 93287 PERI-PX DEVICE EVAL & PRGR: CPT | Mod: 26,59,, | Performed by: INTERNAL MEDICINE

## 2017-03-17 PROCEDURE — 71000033 HC RECOVERY, INTIAL HOUR: Performed by: SURGERY

## 2017-03-17 PROCEDURE — 25000003 PHARM REV CODE 250: Performed by: REGISTERED NURSE

## 2017-03-17 PROCEDURE — 99233 SBSQ HOSP IP/OBS HIGH 50: CPT | Mod: ,,, | Performed by: INTERNAL MEDICINE

## 2017-03-17 PROCEDURE — 85520 HEPARIN ASSAY: CPT

## 2017-03-17 PROCEDURE — 71000039 HC RECOVERY, EACH ADD'L HOUR: Performed by: SURGERY

## 2017-03-17 PROCEDURE — 27000221 HC OXYGEN, UP TO 24 HOURS

## 2017-03-17 PROCEDURE — 93010 ELECTROCARDIOGRAM REPORT: CPT | Mod: ,,, | Performed by: INTERNAL MEDICINE

## 2017-03-17 PROCEDURE — 36558 INSERT TUNNELED CV CATH: CPT | Mod: GC,,, | Performed by: SURGERY

## 2017-03-17 PROCEDURE — C1751 CATH, INF, PER/CENT/MIDLINE: HCPCS | Performed by: SURGERY

## 2017-03-17 PROCEDURE — 85025 COMPLETE CBC W/AUTO DIFF WBC: CPT

## 2017-03-17 PROCEDURE — 80053 COMPREHEN METABOLIC PANEL: CPT

## 2017-03-17 PROCEDURE — 20000000 HC ICU ROOM

## 2017-03-17 DEVICE — CATH POWERHICKMAN 8FR 5CM: Type: IMPLANTABLE DEVICE | Site: SUBCLAVIAN | Status: FUNCTIONAL

## 2017-03-17 RX ORDER — PHENYLEPHRINE HCL IN 0.9% NACL 1 MG/10 ML
SYRINGE (ML) INTRAVENOUS
Status: DISPENSED
Start: 2017-03-17 | End: 2017-03-18

## 2017-03-17 RX ORDER — LIDOCAINE HYDROCHLORIDE 10 MG/ML
INJECTION, SOLUTION EPIDURAL; INFILTRATION; INTRACAUDAL; PERINEURAL
Status: DISCONTINUED | OUTPATIENT
Start: 2017-03-17 | End: 2017-03-17

## 2017-03-17 RX ORDER — EPINEPHRINE 1 MG/ML
INJECTION, SOLUTION INTRACARDIAC; INTRAMUSCULAR; INTRAVENOUS; SUBCUTANEOUS
Status: DISPENSED
Start: 2017-03-17 | End: 2017-03-18

## 2017-03-17 RX ORDER — MIDAZOLAM HYDROCHLORIDE 1 MG/ML
INJECTION, SOLUTION INTRAMUSCULAR; INTRAVENOUS
Status: DISCONTINUED | OUTPATIENT
Start: 2017-03-17 | End: 2017-03-17

## 2017-03-17 RX ORDER — HEPARIN SODIUM 1000 [USP'U]/ML
INJECTION, SOLUTION INTRAVENOUS; SUBCUTANEOUS
Status: DISCONTINUED | OUTPATIENT
Start: 2017-03-17 | End: 2017-03-17

## 2017-03-17 RX ORDER — VASOPRESSIN 20 [USP'U]/ML
INJECTION, SOLUTION INTRAMUSCULAR; SUBCUTANEOUS
Status: DISCONTINUED | OUTPATIENT
Start: 2017-03-17 | End: 2017-03-17

## 2017-03-17 RX ORDER — WARFARIN 7.5 MG/1
7.5 TABLET ORAL DAILY
Status: DISCONTINUED | OUTPATIENT
Start: 2017-03-17 | End: 2017-03-18

## 2017-03-17 RX ORDER — BUMETANIDE 1 MG/1
2 TABLET ORAL DAILY
Status: DISCONTINUED | OUTPATIENT
Start: 2017-03-18 | End: 2017-03-22

## 2017-03-17 RX ORDER — VASOPRESSIN 20 [USP'U]/ML
INJECTION, SOLUTION INTRAMUSCULAR; SUBCUTANEOUS
Status: DISPENSED
Start: 2017-03-17 | End: 2017-03-18

## 2017-03-17 RX ORDER — CEFAZOLIN SODIUM 1 G/3ML
INJECTION, POWDER, FOR SOLUTION INTRAMUSCULAR; INTRAVENOUS
Status: DISCONTINUED | OUTPATIENT
Start: 2017-03-17 | End: 2017-03-17

## 2017-03-17 RX ADMIN — CEFAZOLIN 2 G: 1 INJECTION, POWDER, FOR SOLUTION INTRAVENOUS at 11:03

## 2017-03-17 RX ADMIN — DEXMEDETOMIDINE HYDROCHLORIDE 0.7 MCG/KG/HR: 100 INJECTION, SOLUTION, CONCENTRATE INTRAVENOUS at 11:03

## 2017-03-17 RX ADMIN — EPINEPHRINE 0.25 MCG/KG/MIN: 1 INJECTION PARENTERAL at 09:03

## 2017-03-17 RX ADMIN — KETAMINE HYDROCHLORIDE 40 MG: 100 INJECTION, SOLUTION, CONCENTRATE INTRAMUSCULAR; INTRAVENOUS at 11:03

## 2017-03-17 RX ADMIN — MIRTAZAPINE 7.5 MG: 7.5 TABLET ORAL at 09:03

## 2017-03-17 RX ADMIN — EPINEPHRINE 0.08 MCG/KG/MIN: 1 INJECTION PARENTERAL at 12:03

## 2017-03-17 RX ADMIN — VASOPRESSIN 5 UNITS: 20 INJECTION, SOLUTION INTRAMUSCULAR; SUBCUTANEOUS at 11:03

## 2017-03-17 RX ADMIN — MIDAZOLAM HYDROCHLORIDE 2 MG: 1 INJECTION, SOLUTION INTRAMUSCULAR; INTRAVENOUS at 11:03

## 2017-03-17 RX ADMIN — SPIRONOLACTONE 25 MG: 25 TABLET, FILM COATED ORAL at 08:03

## 2017-03-17 RX ADMIN — BUMETANIDE 2 MG: 1 TABLET ORAL at 08:03

## 2017-03-17 RX ADMIN — LEVETIRACETAM 750 MG: 750 TABLET, FILM COATED ORAL at 09:03

## 2017-03-17 RX ADMIN — Medication 3 ML: at 02:03

## 2017-03-17 RX ADMIN — LEVETIRACETAM 750 MG: 750 TABLET, FILM COATED ORAL at 08:03

## 2017-03-17 RX ADMIN — GABAPENTIN 300 MG: 300 CAPSULE ORAL at 09:03

## 2017-03-17 RX ADMIN — WARFARIN SODIUM 7.5 MG: 2.5 TABLET ORAL at 04:03

## 2017-03-17 RX ADMIN — DIGOXIN 0.12 MG: 125 TABLET ORAL at 06:03

## 2017-03-17 RX ADMIN — MIDAZOLAM HYDROCHLORIDE 2 MG: 1 INJECTION, SOLUTION INTRAMUSCULAR; INTRAVENOUS at 10:03

## 2017-03-17 RX ADMIN — HEPARIN SODIUM AND DEXTROSE 16.02 UNITS/KG/HR: 10000; 5 INJECTION INTRAVENOUS at 04:03

## 2017-03-17 RX ADMIN — Medication 400 MG: at 08:03

## 2017-03-17 NOTE — PROGRESS NOTES
Heart Failure Progress Note  Attending Physician: Landon Galeano MD  Hospital Day: 15    Subjective:   Interval History: No events reported overnight. Went for Emmanuel placement this AM. Post-procedure, patient was hypotensive with SBP to 60's. A-line placed by anesthesia, started on Epinephrine gtt. Received bolus of vasopressin 5 mg x 2 earlier.    Per anesthesia records, received ketamine 40 mg and 20 mg, midazolam IVP 2 mg x 2, fentanyl 100 mcg IVP, propofol 10 mg IVP x 4, and Precedex gtt. All sedation now turned off.    Medications:   Continuous Infusions:   DOBUTamine 10 mcg/kg/min (03/17/17 1145)    epinephrine infusion 0.19 mcg/kg/min (03/17/17 1424)    heparin (porcine) in D5W Stopped (03/17/17 0005)       Scheduled Meds:   [START ON 3/18/2017] bumetanide  2 mg Oral Daily    coenzyme Q10  400 mg Oral Daily    digoxin  0.125 mg Oral Every Mon, Wed, Fri    EPINEPHrine        gabapentin  300 mg Oral QHS    hydrALAZINE  50 mg Oral Q6H    isosorbide dinitrate  20 mg Oral Q6H    levetiracetam  750 mg Oral BID    levocarnitine  330 mg Oral BID    mirtazapine  7.5 mg Oral QHS    phenylephrine HCl in 0.9% NaCl        senna-docusate 8.6-50 mg  2 tablet Oral BID    sodium chloride 0.9%  3 mL Intravenous Q8H    spironolactone  25 mg Oral Daily    vasopressin         PRN Meds:alprazolam, ceFAZolin 2 g/50mL Dextrose IVPB, heparin (PORCINE), heparin (PORCINE)  Objective:     Vitals:  Temp:  [98.2 °F (36.8 °C)-98.6 °F (37 °C)]   Pulse:  []   Resp:  [14-32]   BP: ()/(37-79)   SpO2:  [88 %-100 %]   Arterial Line BP: (62-84)/(38-52)  I/O's:    Intake/Output Summary (Last 24 hours) at 03/17/17 1428  Last data filed at 03/17/17 0600   Gross per 24 hour   Intake              360 ml   Output             1700 ml   Net            -1340 ml        Constitutional: NAD, conversant  HEENT: Sclera anicteric, PERRLA, EOMI  Neck: No JVD, no carotid bruits  CV: RRR, no murmur, normal S1/S2  Pulm: CTAB, no  wheezes, rales, or ronchi  GI: Abdomen soft, NTND, +BS  Extremities: No LE edema, warm and well perfused  Skin: No ecchymosis, erythema, or ulcers  Psych: AOx3, appropriate affect  Neuro: CNII-XII intact, no focal deficits    Labs:       Recent Labs  Lab 03/16/17  0542 03/17/17  0406 03/17/17  1250   * 131* 129*   K 4.1 4.2 4.0   CL 96 95 97   CO2 21* 21* 15*   BUN 32* 38* 39*   CREATININE 1.9* 2.2* 2.7*   * 141* 289*   ANIONGAP 15 15 17*       Recent Labs  Lab 03/16/17  0542 03/17/17  0406 03/17/17  1250   AST 61* 59* 54*   ALT 67* 68* 60*   ALKPHOS 179* 182* 178*   BILITOT 1.0 0.9 1.0   ALBUMIN 3.2* 3.2* 2.9*        Recent Labs  Lab 03/15/17  0530 03/16/17  0542 03/17/17  0406   WBC 10.63  10.63 9.22 8.98   HGB 11.3*  11.3* 11.8* 11.9*   HCT 33.4*  33.4* 35.2* 34.2*     228 219 250   GRAN 68.5  68.5  7.3  7.3 67.1  6.2 73.1*  6.6       Recent Labs  Lab 03/15/17  0530 03/16/17  0542 03/17/17  0406   INR 1.4*  1.4* 1.7* 1.7*     No results for input(s): TROPONINI, BNP in the last 168 hours.   Micro:   Blood Cultures  Lab Results   Component Value Date    LABBLOO No growth after 5 days. 03/10/2017    LABBLOO No growth after 5 days. 03/10/2017     Urine Cultures  Lab Results   Component Value Date    LABURIN No growth 03/10/2017       Imaging:   TTE (3/15/2017)  TEST DESCRIPTION   Technical Quality: This is a technically adequate study.     Aorta: The aortic root is normal in size, measuring 2.1 cm at sinotubular junction and 3.1 cm at Sinuses of Valsalva. The proximal ascending aorta is normal in size, measuring 2.5 cm across.     Left Atrium: The left atrial volume index is severely enlarged, measuring 64.37 cc/m2.     Left Ventricle: The left ventricle is normal in size, with an end-diastolic diameter of 6.7 cm, and an end-systolic diameter of 5.9 cm. LV wall thickness is normal, with the septum and the posterior wall each measuring 1.0 cm across. Relative wall thickness was normal at  0.30, and the LV mass index was increased at 219.9 g/m2 consistent with eccentric left ventricular hypertrophy. Global left ventricular systolic function appears severely depressed. Visually estimated ejection fraction is <10%. The LV Doppler derived stroke volume equals 28.0 ccs; at a measured heart rate of 103 bpm this results in a LV Doppler derived cardiac output of 2.9 L/min (CI = 1.78 L/min/m2). There is global hypokinesis.     Right Atrium: The right atrium is enlarged, measuring 6.7 cm in length and 5.3 cm in width in the apical view.     Right Ventricle: The right ventricle is enlarged measuring 5.2 cm at the base in the apical right ventricle-focused view. Global right ventricular systolic function appears low normal to mildly depressed. Tricuspid annular plane systolic excursion (TAPSE) is 2.2 cm. Tissue Doppler-derived tricuspid annular peak systolic velocity (S prime) is 12.4 cm/s.     Aortic Valve:  Aortic valve is normal in structure with normal leaflet mobility.     Mitral Valve:  Mitral valve is normal in structure with normal leaflet mobility.     Tricuspid Valve:  Tricuspid valve is normal in structure with normal leaflet mobility.     Pulmonary Valve:  Pulmonary valve is normal in structure with normal leaflet mobility.     IVC: IVC is enlarged and collapses < 50% with a sniff, suggesting high right atrial pressure of 15 mmHg.     Shunt: This study was performed in conjunction with intravenous bubble contrast. There is evidence of a right to left shunt across the atrial septum.     Intracavitary: There is no evidence of pericardial effusion, intracavity mass, thrombi, or vegetation.     Other: If clinically indicated, a full Doppler study can be performed.     RV/LV ratio is 0.78 (RV/LV diameter ratio > 0.75 is associated with higher risk of RV failure in patients with continuous-flow LVADs).     CONCLUSIONS     1 - Eccentric hypertrophy.     2 - Severely depressed left ventricular systolic  function (EF <10%).     3 - Biatrial enlargement.     4 - Right ventricular enlargement with low normal to mildly depressed systolic function.     5 - Increased central venous pressure.    EF   Date Value Ref Range Status   03/14/2017 9 (A) 55 - 65    03/03/2017 10 (A) 55 - 65    03/03/2017 10 (A) 55 - 65      Assessment:   37 y/o M with PMHx of HFrEF (EF <10%) 2/2 NICM s/p AICD (implanted 12/2013), MELAS (mitochondrial encephalopathy, lactic acidosis, and stroke-like episodes), Hx of LV thrombus, Atrial Flutter, CKD, seizure disorder, who was transferred to [facility] from [facility] in cardiogenic shock and consideration for heart transplant and/or advanced options. IABP now out, decreasing support, still on .    Patient has been denied advanced options when presented. Pathway was closed in step 1 due to inability to care for himself and lack of insight.    Plan:   HFrEF (NYHA Functional Class IV, ACC/AHA Stage D) 2/2 NICM, presented in Cardiogenic shock, improving, off IABP, still requiring inotrope; Net -1.6L last 24H, -22.4 L since admission  - Continue  @ 2.5 mcg/kg/min  - Will reduced Bumex to 2 mg Daily with increase in Cr to 2.2 and BUN trending up to 38 today  - Patient now hypotensive post-Emmanuel placement, likely 2/2 anesthetic medications  - Continue epinephrine gtt for now, will try to wean off this afternoon  - Holding hydralazine 50 mg and ISDN 20 mg Q6H while on epinephrine gtt  - Holding Aldactone 25 mg Daily while on epinephrine gtt  - Continue digoxin 125 mcg MWF  - Not a candidate for advanced options, pathway stopped at Step 1     Leukocytosis, concern for sepsis, improving to 8.9 today  - US with sludge; CXR with no pneumonia  - Blood and Urine Cx NGTD (sent 3/10/2017)  - Completed 7 day course of Vancomycin and Cefepime on 3/16/2017  - ID on consult, appreciate rec's     Cerebral Air Embolism  - CT on 3/9/2017 with venous air embolism within the cavernous sinuses and smaller component within the  frontal sulci  - EEG normal, unable to get MRI 2/2 ICD  - TTE with bubble shows R to left shunt across the atrial septum  - Stroke team on consult, appreciate rec's     Atrial Flutter, s/p RENÉE/DCCV, currently in sinus rhythm  - INR 1.7 today  - Continue heparin gtt bridge to Coumadin, currently 7.5 mg Daily  - Follow up with Dr. Reyez 4 weeks after discharge    MELAS  - Continue home medications Keppra, L-carnitine and CoQ  - Vascular neurology on consult, appreciate rec's     Diet: Cardiac diet with boost  PPx: On heparin gtt while bridging to Coumadin  PT/OT    Code Status: FULL CODE  Disposition: With hypotensive episode post-Emmanuel placement, will re-assess this PM to see if epinephrine gtt can be weaned off. If unable to wean, will transfer to the CMICU tonight. Possible discharge over the weekend if blood pressure stabilizes and INR becomes therapeutic, will go home with home health and home . Follow up with Dr. Maico Encarnacion at [facility].    Patient seen and examined this morning. Discussed with Dr. Manzo, staff attestation to follow.    Signed:  Mekhi Caro MD  Cardiology Fellow - PGY4  Pager: [phone]  3/17/2017 2:46 PM

## 2017-03-17 NOTE — PROGRESS NOTES
SUBJECTIVE:  Pt seen and examined this AM.  No acute events overnight.  IJ central line removed this AM.  Heparin drip off.  Afebrile.      OBJECTIVE:  Temp:  [97.4 °F (36.3 °C)-98.6 °F (37 °C)]   Pulse:  []   Resp:  [16-18]   BP: ()/(59-75)   SpO2:  [95 %-99 %]     Constitutional: He appears well-developed. No distress. cachectic   Neck: Normal range of motion. Neck supple. No thyromegaly present.   Cardiovascular: Normal rate, regular rhythm and normal heart sounds.   Pulmonary/Chest: Effort normal and breath sounds normal. No respiratory distress. He has no wheezes.   L upper chest ICD in place   Abdominal: Soft. Bowel sounds are normal. He exhibits no distension and no mass. There is no tenderness. There is no rebound and no guarding.   Musculoskeletal: Normal range of motion. He exhibits no edema.   Neurological: He is alert and oriented to person, place, and time.   Skin: Skin is warm and dry.   Psychiatric: He has a normal mood and affect.     I & O (Last 24H):  Intake/Output Summary (Last 24 hours) at 03/17/17 0703  Last data filed at 03/16/17 2207   Gross per 24 hour   Intake              610 ml   Output             1550 ml   Net             -940 ml       Lab Results   Component Value Date    WBC 8.98 03/17/2017    HGB 11.9 (L) 03/17/2017    HCT 34.2 (L) 03/17/2017    MCV 86 03/17/2017     03/17/2017     Lab Results   Component Value Date    CREATININE 2.2 (H) 03/17/2017    BUN 38 (H) 03/17/2017     (L) 03/17/2017    K 4.2 03/17/2017    CL 95 03/17/2017    CO2 21 (L) 03/17/2017    CALCIUM 10.0 03/17/2017    PHOS 4.8 (H) 03/17/2017    MG 1.8 03/17/2017       ASSESSMENT/PLAN:   Binh Conde is a 36 y.o. male with NICM and CHF, not a candidate for advanced support or transplant, who requires home dobutamine infusion for palliation    -to OR today for Emmanuel catheter placement.    DENY Henson MD  PGY-3  Pager: [phone]

## 2017-03-17 NOTE — PT/OT/SLP PROGRESS
Occupational Therapy      Binh Conde  MRN: [id number]    Patient not seen today secondary to Other (pt away for central line and Emmanuel placement). Will follow-up as scheduled.    JOHN Blackburn  3/17/2017

## 2017-03-17 NOTE — PLAN OF CARE
Problem: Patient Care Overview  Goal: Plan of Care Review  Outcome: Ongoing (interventions implemented as appropriate)  Patient remains free of falls or injury. Patient denies pain. Continued on dobutamine drip. Heparin drip turned off at midnight for nunes placement in AM. Patient NPO since midnight for nunes place. RIJ central line to be removed in AM. Possible d/c after nunes placement. Plan of care reviewed with patient and sister.

## 2017-03-17 NOTE — PROGRESS NOTES
Spoke with Dr Rios regarding patient art line pressure SYS 70-80's States to continue to wean Epi infusion and will check back  in an hour on status of patient and to decide where the patient will be transferred to.

## 2017-03-17 NOTE — PROGRESS NOTES
RIJ cordis removed per nursing communication in preparation for nunes placement this AM. Manual pressure held for 10 minutes post removal. Vaseline gauze, 4x4, tegaderm placed at site; no bleeding noted. Patient instructed to lie flat for 30 minutes after removal. Patient tolerated well. Will continue to monitor.

## 2017-03-17 NOTE — PROGRESS NOTES
Arrhythmia clinic  Pt in PACU.  Having SR-ST with freq PVCs.  ICD tachy therapies and detection restored.

## 2017-03-17 NOTE — CONSULTS
History & Physical  Surgery      SUBJECTIVE:     Chief Complaint/Reason for Admission: Emmanuel catheter placement for home dobutamine infusion    History of Present Illness: Binh Conde is a 36 y.o. male with history of HFr HFrEF (EF <10%) 2/2 NICM s/p AICD (implanted 12/2013), MELAS (mitochondrial encephalopathy, lactic acidosis, and stroke-like episodes), Hx of LV thrombus, Atrial Flutter, CKD, seizure disorder who was transferred to [facility] from [facility] in cardiogenic shock and consideration for heart transplant and/or advanced options.  He required IABP placement upon arrival but this has since been discontinued.  He continues to require dobutamine.  He is also on a heparin drip bridge to coumadin for a flutter s/p cardioversion, no visualized thrombus on RENÉE 3/3/17.  Now in normal sinus rhythm with INR 1.7.      No current facility-administered medications on file prior to encounter.      No current outpatient prescriptions on file prior to encounter.       Review of patient's allergies indicates:   Allergen Reactions    Aspirin     Bactrim [sulfamethoxazole-trimethoprim]     Depakote [divalproex]     Dilantin [phenytoin sodium extended]     Lorazepam     Phenobarbital     Tegretol [carbamazepine]     Thallium-201        Past Medical History:   Diagnosis Date    Acute on chronic systolic congestive heart failure 3/16/2017    Dr. Encarnacion spoke with Dr. Encarnacion and his sister Shiloh by phone 3/16/17 and explained concerns regarding advanced options (Pathway stopped 3/3/17 and discussed by [facility] physicians as group 3/15/17) Discussed his prognosis with death rate estimated at 50% at 1 year and 80% at 3 years Dr. Encarnacion will follow as outpatient and agrees with [facility] for palliation and Emmanuel Sister plans for patient to move in wi    CHF (congestive heart failure)     Hypertension     Seizures      History reviewed. No pertinent surgical history.  History reviewed. No pertinent family history.  Social History    Substance Use Topics    Smoking status: Never Smoker    Smokeless tobacco: None    Alcohol use No        Review of Systems   Constitutional: Positive for fatigue. Negative for chills and fever.   HENT: Negative for congestion and rhinorrhea.    Eyes: Negative for visual disturbance.   Respiratory: Positive for shortness of breath. Negative for cough.    Cardiovascular: Negative for chest pain.   Gastrointestinal: Negative for constipation, diarrhea, nausea and vomiting.   Endocrine: Negative for polyuria.   Genitourinary: Negative for dysuria.   Musculoskeletal: Negative for arthralgias and myalgias.   Skin: Negative for wound.   Neurological: Positive for weakness. Negative for seizures, light-headedness and headaches.   Psychiatric/Behavioral: Negative for agitation.     OBJECTIVE:     Vital Signs (Most Recent)  Temp: 98.5 °F (36.9 °C) (03/16/17 2000)  Pulse: 106 (03/16/17 2100)  Resp: 18 (03/16/17 2000)  BP: 101/66 (03/16/17 2000)  SpO2: 99 % (03/16/17 2000)    Physical Exam   Constitutional: He is oriented to person, place, and time. He appears well-developed. No distress.   cachectic   HENT:   Head: Normocephalic and atraumatic.   R neck temporary central line in place   Eyes: Conjunctivae and EOM are normal. Pupils are equal, round, and reactive to light.   Neck: Normal range of motion. Neck supple. No thyromegaly present.   Cardiovascular: Normal rate, regular rhythm and normal heart sounds.    Pulmonary/Chest: Effort normal and breath sounds normal. No respiratory distress. He has no wheezes.   L upper chest ICD in place   Abdominal: Soft. Bowel sounds are normal. He exhibits no distension and no mass. There is no tenderness. There is no rebound and no guarding.   Musculoskeletal: Normal range of motion. He exhibits no edema.   Neurological: He is alert and oriented to person, place, and time.   Skin: Skin is warm and dry.   Psychiatric: He has a normal mood and affect.     Laboratory  CBC:   Recent  Labs  Lab 03/16/17  0542   WBC 9.22   RBC 3.92*   HGB 11.8*   HCT 35.2*      MCV 90   MCH 30.1   MCHC 33.5     CMP:   Recent Labs  Lab 03/16/17  0542   *   CALCIUM 9.9   ALBUMIN 3.2*   PROT 7.6   *   K 4.1   CO2 21*   CL 96   BUN 32*   CREATININE 1.9*   ALKPHOS 179*   ALT 67*   AST 61*   BILITOT 1.0     Coagulation:   Recent Labs  Lab 03/16/17  0542   INR 1.7*     Microbiology Results (last 7 days)     Procedure Component Value Units Date/Time    Blood culture [[id number]] Collected:  03/10/17 0842    Order Status:  Completed Specimen:  Blood from Peripheral, Antecubital, Right Updated:  03/15/17 1212     Blood Culture, Routine No growth after 5 days.    Blood culture [[id number]] Collected:  03/10/17 0824    Order Status:  Completed Specimen:  Blood from Peripheral, Hand, Right Updated:  03/15/17 1212     Blood Culture, Routine No growth after 5 days.    Urine culture [[id number]] Collected:  03/10/17 2023    Order Status:  Completed Specimen:  Urine from Urine, Clean Catch Updated:  03/12/17 0536     Urine Culture, Routine No growth          Diagnostic Results:  X-Ray: Reviewed    ASSESSMENT/PLAN:     Patient Active Problem List   Diagnosis    MELAS (mitochondrial encephalopathy, lactic acidosis and stroke-like episodes)    Typical atrial flutter    NICM (nonischemic cardiomyopathy)    Seizure disorder    CKD (chronic kidney disease) stage 4, GFR 15-29 ml/min    Thrombocytopenia    Coagulopathy    Transaminitis    Hypoalbuminemia    Cognitive impairment    Cardiogenic shock    Acute on chronic systolic congestive heart failure       A/P:  35 yo M with NICM and CHF, not a candidate for advanced support or transplant, who requires home dobutamine infusion for palliation    NPO at midnight  Stop heparin drip at midnight  Check INR in AM  D/c R IJ central line at 6AM  To OR tomorrow for nunes catheter placement, prefer R IJ placement as patient has L subclavian ICD  Ancef on call to  OR  The procedure was described in detail to the patient and all questions were answered.  The risks and benefits of the procedure were discussed and the patient wishes to proceed with surgery.    Robbi Mcwilliams  General Surgery, PGY-4  Pager # [phone]

## 2017-03-17 NOTE — PROGRESS NOTES
Dr Kapadia and multiple members of anesthesia team present at bedside. A-line placement performed. Verbal order for epinephrine gtt given.  Epinephrine and Neosynephrine pushes given by Anesthesia MD. Cardiology at bedside and ordered to maintain SBP 80-85.  Labs ordered, drawn and sent. Pacemaker clinic notified multiple times to turn AICD on.

## 2017-03-17 NOTE — ANESTHESIA PREPROCEDURE EVALUATION
03/16/2017  Binh Conde is a 36 y.o., male.     35 y/o M with PMHx of HFrEF (EF <10%) 2/2 NICM s/p AICD (implanted 12/2013), MELAS (mitochondrial encephalopathy, lactic acidosis, and stroke-like episodes), Hx of LV thrombus, Atrial Flutter, CKD, seizure disorder, who was transferred to [facility] in cardiogenic shock, IABP removed , had cerebral air embolism plan for emmanuel for home  for palliation.    Pre-operative evaluation for Procedure(s) (LRB):  INSERTION-CENTRAL LINE.  Emmanuel catheter.  Please have flouroscopy and ultrasound available. (N/A)     2.5    Patient Active Problem List   Diagnosis    MELAS (mitochondrial encephalopathy, lactic acidosis and stroke-like episodes)    Typical atrial flutter    NICM (nonischemic cardiomyopathy)    Seizure disorder    CKD (chronic kidney disease) stage 4, GFR 15-29 ml/min    Thrombocytopenia    Coagulopathy    Transaminitis    Hypoalbuminemia    Cognitive impairment    Cardiogenic shock    Acute on chronic systolic congestive heart failure       Review of patient's allergies indicates:   Allergen Reactions    Aspirin     Bactrim [sulfamethoxazole-trimethoprim]     Depakote [divalproex]     Dilantin [phenytoin sodium extended]     Lorazepam     Phenobarbital     Tegretol [carbamazepine]     Thallium-201        No current facility-administered medications on file prior to encounter.      No current outpatient prescriptions on file prior to encounter.       History reviewed. No pertinent surgical history.    Social History     Social History    Marital status: Single     Spouse name: N/A    Number of children: N/A    Years of education: N/A     Occupational History    Not on file.     Social History Main Topics    Smoking status: Never Smoker    Smokeless tobacco: Not on file    Alcohol use No    Drug use: No    Sexual activity:  Not Currently     Partners: Female     Other Topics Concern    Not on file     Social History Narrative         Vital Signs Range (Last 24H):  Temp:  [36.3 °C (97.4 °F)-37.1 °C (98.7 °F)]   Pulse:  []   Resp:  [16-18]   BP: ()/(59-75)   SpO2:  [96 %-99 %]       CBC:   Recent Labs      03/15/17   0530  17   0542   WBC  10.63  10.63  9.22   RBC  3.81*  3.81*  3.92*   HGB  11.3*  11.3*  11.8*   HCT  33.4*  33.4*  35.2*   PLT  228  228  219   MCV  88  88  90   MCH  29.7  29.7  30.1   MCHC  33.8  33.8  33.5       CMP:   Recent Labs      03/15/17   0530  17   0542   NA  133*  133*  132*   K  4.5  4.5  4.1   CL  100  100  96   CO2  20*  20*  21*   BUN  31*  31*  32*   CREATININE  1.7*  1.7*  1.9*   GLU  117*  117*  131*   MG  1.7  1.7  1.8   PHOS  3.4  3.4  4.3   CALCIUM  9.5  9.5  9.9   ALBUMIN  3.1*  3.1*  3.2*   PROT  7.1  7.1  7.6   ALKPHOS  167*  167*  179*   ALT  65*  65*  67*   AST  62*  62*  61*   BILITOT  1.0  1.0  1.0       INR  Recent Labs      17   0507  03/15/17   0530  17   0542   INR  1.2  1.4*  1.4*  1.7*           Diagnostic Studies:      EKD Echo:      [facility] Anesthesia Evaluation    I have reviewed the Patient Summary Reports.    I have reviewed the Nursing Notes.   I have reviewed the Medications.     Review of Systems  Anesthesia Hx:  No problems with previous Anesthesia  History of prior surgery of interest to airway management or planning: Previous anesthesia: General Denies Family Hx of Anesthesia complications.   Denies Personal Hx of Anesthesia complications.   Social:  Non-Smoker    Hematology/Oncology:     Oncology Normal    -- Anemia:   EENT/Dental:EENT/Dental Normal   Cardiovascular:   Exercise tolerance: poor Pacemaker Dysrhythmias CHF ECG has been reviewed. Atrial flutter; EF < 10% due to NICM; IABP    Pulmonary:  Pulmonary Normal    Renal/:  Renal/ Normal     Hepatic/GI:   GERD    Musculoskeletal:  Musculoskeletal  Normal    Neurological:   CVA Neuromuscular Disease, Seizures Mitochondrial encephalopathy; stroke-like episodes (MELAS)   Endocrine:  Endocrine Normal    Dermatological:  Skin Normal    Psych:  Psychiatric Normal           Physical Exam  General:  Malnutrition    Airway/Jaw/Neck:  Airway Findings: Mouth Opening: Normal Tongue: Normal  Mallampati: II  Improves to II with phonation.  TM Distance: Normal, at least 6 cm      Dental:  Dental Findings: Periodontal disease, Mild   Chest/Lungs:  Chest/Lungs Findings: Normal Respiratory Rate     Heart/Vascular:  Heart Findings: Rate: Normal  Rhythm: Regular Rhythm        Mental Status:  Mental Status Findings:  Cooperative         Anesthesia Plan  Type of Anesthesia, risks & benefits discussed:  Anesthesia Type:  general, MAC  Patient's Preference:   Intra-op Monitoring Plan: standard ASA monitors  Intra-op Monitoring Plan Comments:   Post Op Pain Control Plan:   Post Op Pain Control Plan Comments:   Induction:   IV  Beta Blocker:  Patient is not currently on a Beta-Blocker (No further documentation required).       Informed Consent: Patient representative understands risks and agrees with Anesthesia plan.  Questions answered. Anesthesia consent signed with patient.  ASA Score: 4     Day of Surgery Review of History & Physical: I have interviewed and examined the patient. I have reviewed the patient's H&P dated:        Anesthesia Plan Notes: Patient with benzodiazapine allergy,  Plan for fentanyl, ketamine and dexmeditomidine.   Ephedrine for hypotension.          Ready For Surgery From Anesthesia Perspective.

## 2017-03-17 NOTE — PROGRESS NOTES
Spoke with heart transplant team. Dr Blanca Das to start heparin drip at 10.4cc as previously infusing on unit and check levels in 6hrs as ordered

## 2017-03-17 NOTE — PLAN OF CARE
Problem: Patient Care Overview  Goal: Plan of Care Review  Outcome: Ongoing (interventions implemented as appropriate)  POC reviewed with pt at 1800. Pt verbalized understanding. Questions and concerns addressed. Patient came from PACU with at Emmanuel procedure at 1715. SBP goal is 80 - 85. Patient is currently on Epi (0.40), dobutamine, and heparin. Heparin was started at 1627, 6 HR antixa draw needed. Patient is on 2L NC, stating in the high 90s. Pt progressing toward goals. Will continue to monitor. See flowsheets for full assessment and VS info.

## 2017-03-17 NOTE — PROGRESS NOTES
Arrhythmia clinic-Order received to reprogram pt's ICD for OR.  Pt has MDT ICD single lead set at VVI 40bpm.  RV paces 0%.  SR in 90s.  ICD tachy detection and therapies disabled.  Please call [phone] post op to have pt reprogrammed.

## 2017-03-17 NOTE — TRANSFER OF CARE
Anesthesia Transfer of Care Note    Patient: Binh Conde    Procedure(s) Performed: Procedure(s) (LRB):  INSERTION-CENTRAL LINE.  Emmanuel catheter.  Please have flouroscopy and ultrasound available. (N/A)    Patient location: PACU    Anesthesia Type: general    Transport from OR: Transported from OR on 6-10 L/min O2 by face mask with adequate spontaneous ventilation    Post pain: adequate analgesia    Post assessment: no apparent anesthetic complications    Post vital signs: stable    Level of consciousness: awake    Nausea/Vomiting: no nausea/vomiting    Complications: none          Last vitals:   Visit Vitals    BP 98/74 (BP Location: Right arm, Patient Position: Lying, BP Method: Automatic)    Pulse 99    Temp 37 °C (98.6 °F) (Oral)    Resp (!) 23    Ht 6' (1.829 m)    Wt 47 kg (103 lb 9.9 oz)    SpO2 100%    BMI 14.05 kg/m2

## 2017-03-17 NOTE — PROGRESS NOTES
Unable to consistently obtain BP and SpO2. Pulses present.  Anesthesia notified and coming to assess pt.

## 2017-03-17 NOTE — NURSING TRANSFER
Nursing Transfer Note      3/17/2017     Transfer To: 3097    Transfer via stretcher    Transfer with cardiac monitoring    Transported by RN    Medicines sent: IVF     Chart send with patient: Yes    Notified: PATIENT    Patient reassessed at: 03/17/17    Upon arrival to floor:

## 2017-03-17 NOTE — PLAN OF CARE
Problem: Patient Care Overview  Goal: Plan of Care Review  Outcome: Ongoing (interventions implemented as appropriate)  Pt verbalizes no complaints. Denies CP, SOB, or other discomforts.  drip infusing as ordered. Pt scheduled for nunes placement today for home .  Pt remains free of fall or injury. Pt verbalizes understanding of plan of care. Will continue to monitor.

## 2017-03-17 NOTE — PROGRESS NOTES
DISCHARGE    SW to pt's room for discharge multiple times today, both this morning and afternoon.  Pt off unit on all attempts.  SW spoke with pt's sister Deisi ([phone]) re: possible weekend d/c.  Sister verbalizes understanding and agreement with possible d/c this weekend.  Sister states pt will have transportation home from family over weekend.  Sister denies any needs or concerns at this time.  SW notified [facility] ([id number]) and [facility] (ph: [phone], f: [phone]) of likely weekend d/c.  If pt discharges over weekend, nurse should call to notify [facility] for delivery of home  equipment.  SW providing ongoing psychosocial and counseling support, education, resources, assistance, and discharge planning as indicated.  SW remains available.

## 2017-03-17 NOTE — PROGRESS NOTES
Per discussion with surgery, best access for Emmanuel is R IJ. WIll stop heparin at MN and pull CVL in AM in preparation for Emmanuel. INR 1.7 and got coumadin tonight. WIll reassess pending INR in AM    Savanah CALLAHAN

## 2017-03-18 LAB
ALBUMIN SERPL BCP-MCNC: 3.1 G/DL
ALP SERPL-CCNC: 178 U/L
ALT SERPL W/O P-5'-P-CCNC: 98 U/L
ANION GAP SERPL CALC-SCNC: 17 MMOL/L
AST SERPL-CCNC: 1085 U/L
BASOPHILS # BLD AUTO: 0.05 K/UL
BASOPHILS NFR BLD: 0.3 %
BILIRUB SERPL-MCNC: 0.9 MG/DL
BUN SERPL-MCNC: 50 MG/DL
CALCIUM SERPL-MCNC: 9.8 MG/DL
CHLORIDE SERPL-SCNC: 95 MMOL/L
CO2 SERPL-SCNC: 17 MMOL/L
CREAT SERPL-MCNC: 3.1 MG/DL
DIFFERENTIAL METHOD: ABNORMAL
EOSINOPHIL # BLD AUTO: 0 K/UL
EOSINOPHIL NFR BLD: 0.3 %
ERYTHROCYTE [DISTWIDTH] IN BLOOD BY AUTOMATED COUNT: 15.2 %
EST. GFR  (AFRICAN AMERICAN): 28.4 ML/MIN/1.73 M^2
EST. GFR  (NON AFRICAN AMERICAN): 24.5 ML/MIN/1.73 M^2
FACT X PPP CHRO-ACNC: 0.6 IU/ML
GLUCOSE SERPL-MCNC: 144 MG/DL
HCT VFR BLD AUTO: 33.7 %
HGB BLD-MCNC: 11.5 G/DL
INR PPP: 2.6
LYMPHOCYTES # BLD AUTO: 1.5 K/UL
LYMPHOCYTES NFR BLD: 10.2 %
MAGNESIUM SERPL-MCNC: 2 MG/DL
MCH RBC QN AUTO: 29.9 PG
MCHC RBC AUTO-ENTMCNC: 34.1 %
MCV RBC AUTO: 88 FL
MONOCYTES # BLD AUTO: 1.6 K/UL
MONOCYTES NFR BLD: 11.1 %
NEUTROPHILS # BLD AUTO: 11.2 K/UL
NEUTROPHILS NFR BLD: 77.2 %
PHOSPHATE SERPL-MCNC: 6.7 MG/DL
PLATELET # BLD AUTO: 244 K/UL
PMV BLD AUTO: 11.7 FL
POTASSIUM SERPL-SCNC: 5.3 MMOL/L
PROT SERPL-MCNC: 7.4 G/DL
PROTHROMBIN TIME: 26.3 SEC
RBC # BLD AUTO: 3.84 M/UL
SODIUM SERPL-SCNC: 129 MMOL/L
WBC # BLD AUTO: 14.54 K/UL

## 2017-03-18 PROCEDURE — 25000003 PHARM REV CODE 250: Performed by: INTERNAL MEDICINE

## 2017-03-18 PROCEDURE — 80053 COMPREHEN METABOLIC PANEL: CPT

## 2017-03-18 PROCEDURE — 63600175 PHARM REV CODE 636 W HCPCS: Performed by: INTERNAL MEDICINE

## 2017-03-18 PROCEDURE — 20000000 HC ICU ROOM

## 2017-03-18 PROCEDURE — 99233 SBSQ HOSP IP/OBS HIGH 50: CPT | Mod: ,,, | Performed by: INTERNAL MEDICINE

## 2017-03-18 PROCEDURE — 83735 ASSAY OF MAGNESIUM: CPT

## 2017-03-18 PROCEDURE — 85520 HEPARIN ASSAY: CPT

## 2017-03-18 PROCEDURE — 85025 COMPLETE CBC W/AUTO DIFF WBC: CPT

## 2017-03-18 PROCEDURE — 84100 ASSAY OF PHOSPHORUS: CPT

## 2017-03-18 PROCEDURE — 85610 PROTHROMBIN TIME: CPT

## 2017-03-18 RX ADMIN — LEVOCARNITINE 3.3 ML: 1 SOLUTION ORAL at 09:03

## 2017-03-18 RX ADMIN — EPINEPHRINE 0.16 MCG/KG/MIN: 1 INJECTION PARENTERAL at 03:03

## 2017-03-18 RX ADMIN — MIRTAZAPINE 7.5 MG: 7.5 TABLET ORAL at 09:03

## 2017-03-18 RX ADMIN — SPIRONOLACTONE 25 MG: 25 TABLET, FILM COATED ORAL at 08:03

## 2017-03-18 RX ADMIN — GABAPENTIN 300 MG: 300 CAPSULE ORAL at 09:03

## 2017-03-18 RX ADMIN — STANDARDIZED SENNA CONCENTRATE AND DOCUSATE SODIUM 2 TABLET: 8.6; 5 TABLET, FILM COATED ORAL at 09:03

## 2017-03-18 RX ADMIN — LEVETIRACETAM 750 MG: 750 TABLET, FILM COATED ORAL at 09:03

## 2017-03-18 RX ADMIN — STANDARDIZED SENNA CONCENTRATE AND DOCUSATE SODIUM 2 TABLET: 8.6; 5 TABLET, FILM COATED ORAL at 08:03

## 2017-03-18 RX ADMIN — LEVETIRACETAM 750 MG: 750 TABLET, FILM COATED ORAL at 08:03

## 2017-03-18 RX ADMIN — BUMETANIDE 2 MG: 1 TABLET ORAL at 08:03

## 2017-03-18 NOTE — PROGRESS NOTES
Heart Failure Progress Note  Attending Physician: Landon Galeano MD  Hospital Day: 16    Subjective:   Interval History: Remains on epinephrine gtt overnight with  gtt. Otherwise no complaints.    Medications:   Continuous Infusions:   DOBUTamine 2.5 mcg/kg/min (03/18/17 1100)    epinephrine infusion 0.12 mcg/kg/min (03/18/17 1100)    heparin (porcine) in D5W Stopped (03/18/17 0933)       Scheduled Meds:   bumetanide  2 mg Oral Daily    coenzyme Q10  400 mg Oral Daily    digoxin  0.125 mg Oral Every Mon, Wed, Fri    gabapentin  300 mg Oral QHS    levetiracetam  750 mg Oral BID    levocarnitine  330 mg Oral BID    mirtazapine  7.5 mg Oral QHS    senna-docusate 8.6-50 mg  2 tablet Oral BID    sodium chloride 0.9%  3 mL Intravenous Q8H     PRN Meds:alprazolam, ceFAZolin 2 g/50mL Dextrose IVPB, heparin (PORCINE), heparin (PORCINE)  Objective:     Vitals:  Temp:  [97 °F (36.1 °C)-99.1 °F (37.3 °C)]   Pulse:  []   Resp:  [2-33]   BP: ()/(37-79)   SpO2:  [88 %-100 %]   Arterial Line BP: (62-95)/(38-70)  I/O's:    Intake/Output Summary (Last 24 hours) at 03/18/17 1139  Last data filed at 03/18/17 0933   Gross per 24 hour   Intake           873.36 ml   Output              250 ml   Net           623.36 ml        Constitutional: NAD, conversant  HEENT: Sclera anicteric, PERRLA, EOMI  Neck: 10-12 cm JVD, no carotid bruits  CV: RRR, no murmur, normal S1/S2  Pulm: CTAB, no wheezes, rales, or ronchi  GI: Abdomen soft, NTND, +BS  Extremities: No LE edema, warm and well perfused  Skin: No ecchymosis, erythema, or ulcers  Psych: AOx3, appropriate affect  Neuro: CNII-XII intact, no focal deficits    Labs:       Recent Labs  Lab 03/17/17  0406 03/17/17  1250 03/18/17  0400   * 129* 129*   K 4.2 4.0 5.3*   CL 95 97 95   CO2 21* 15* 17*   BUN 38* 39* 50*   CREATININE 2.2* 2.7* 3.1*   * 289* 144*   ANIONGAP 15 17* 17*       Recent Labs  Lab 03/17/17  0406 03/17/17  1250 03/18/17  0400   AST 59*  54* 1085*   ALT 68* 60* 98*   ALKPHOS 182* 178* 178*   BILITOT 0.9 1.0 0.9   ALBUMIN 3.2* 2.9* 3.1*        Recent Labs  Lab 03/16/17  0542 03/17/17  0406 03/18/17  0400   WBC 9.22 8.98 14.54*   HGB 11.8* 11.9* 11.5*   HCT 35.2* 34.2* 33.7*    250 244   GRAN 67.1  6.2 73.1*  6.6 77.2*  11.2*       Recent Labs  Lab 03/16/17  0542 03/17/17  0406 03/18/17  0400   INR 1.7* 1.7* 2.6*     No results for input(s): TROPONINI, BNP in the last 168 hours.   Micro:   Blood Cultures  Lab Results   Component Value Date    LABBLOO No growth after 5 days. 03/10/2017    LABBLOO No growth after 5 days. 03/10/2017     Urine Cultures  Lab Results   Component Value Date    LABURIN No growth 03/10/2017       Imaging:   TTE (3/15/2017)  TEST DESCRIPTION   Technical Quality: This is a technically adequate study.     Aorta: The aortic root is normal in size, measuring 2.1 cm at sinotubular junction and 3.1 cm at Sinuses of Valsalva. The proximal ascending aorta is normal in size, measuring 2.5 cm across.     Left Atrium: The left atrial volume index is severely enlarged, measuring 64.37 cc/m2.     Left Ventricle: The left ventricle is normal in size, with an end-diastolic diameter of 6.7 cm, and an end-systolic diameter of 5.9 cm. LV wall thickness is normal, with the septum and the posterior wall each measuring 1.0 cm across. Relative wall thickness was normal at 0.30, and the LV mass index was increased at 219.9 g/m2 consistent with eccentric left ventricular hypertrophy. Global left ventricular systolic function appears severely depressed. Visually estimated ejection fraction is <10%. The LV Doppler derived stroke volume equals 28.0 ccs; at a measured heart rate of 103 bpm this results in a LV Doppler derived cardiac output of 2.9 L/min (CI = 1.78 L/min/m2). There is global hypokinesis.     Right Atrium: The right atrium is enlarged, measuring 6.7 cm in length and 5.3 cm in width in the apical view.     Right Ventricle: The  right ventricle is enlarged measuring 5.2 cm at the base in the apical right ventricle-focused view. Global right ventricular systolic function appears low normal to mildly depressed. Tricuspid annular plane systolic excursion (TAPSE) is 2.2 cm. Tissue Doppler-derived tricuspid annular peak systolic velocity (S prime) is 12.4 cm/s.     Aortic Valve:  Aortic valve is normal in structure with normal leaflet mobility.     Mitral Valve:  Mitral valve is normal in structure with normal leaflet mobility.     Tricuspid Valve:  Tricuspid valve is normal in structure with normal leaflet mobility.     Pulmonary Valve:  Pulmonary valve is normal in structure with normal leaflet mobility.     IVC: IVC is enlarged and collapses < 50% with a sniff, suggesting high right atrial pressure of 15 mmHg.     Shunt: This study was performed in conjunction with intravenous bubble contrast. There is evidence of a right to left shunt across the atrial septum.     Intracavitary: There is no evidence of pericardial effusion, intracavity mass, thrombi, or vegetation.     Other: If clinically indicated, a full Doppler study can be performed.     RV/LV ratio is 0.78 (RV/LV diameter ratio > 0.75 is associated with higher risk of RV failure in patients with continuous-flow LVADs).     CONCLUSIONS     1 - Eccentric hypertrophy.     2 - Severely depressed left ventricular systolic function (EF <10%).     3 - Biatrial enlargement.     4 - Right ventricular enlargement with low normal to mildly depressed systolic function.     5 - Increased central venous pressure.    EF   Date Value Ref Range Status   03/14/2017 9 (A) 55 - 65    03/03/2017 10 (A) 55 - 65    03/03/2017 10 (A) 55 - 65      Assessment:   37 y/o M with PMHx of HFrEF (EF <10%) 2/2 NICM s/p AICD (implanted 12/2013), MELAS (mitochondrial encephalopathy, lactic acidosis, and stroke-like episodes), Hx of LV thrombus, Atrial Flutter, CKD, seizure disorder, who was transferred to [facility]  in cardiogenic shock and consideration for heart transplant and/or advanced options. IABP now out, decreasing support, still on .    Patient has been denied advanced options when presented. Pathway was closed in step 1 due to inability to care for himself and lack of insight.    Plan:   HFrEF (NYHA Functional Class IV, ACC/AHA Stage D) 2/2 NICM, presented in Cardiogenic shock, improving, off IABP, still requiring inotrope; Net +0.5L last 24H, -21.9 L since admission  - Continue  @ 2.5 mcg/kg/min  - Will increase Bumex to 2 mg BID again  - Patient now hypotensive post-Emmanuel placement, likely 2/2 anesthetic medications  - Continue epinephrine gtt for now, will try to wean off this afternoon  - Holding hydralazine 50 mg and ISDN 20 mg Q6H while on epinephrine gtt  - Holding Aldactone 25 mg Daily while on epinephrine gtt  - Continue digoxin 125 mcg MWF  - Not a candidate for advanced options, pathway stopped at Step 1     Leukocytosis, concern for sepsis, improving to 8.9 today  - US with sludge; CXR with no pneumonia  - Blood and Urine Cx NGTD (sent 3/10/2017)  - Completed 7 day course of Vancomycin and Cefepime on 3/16/2017  - ID on consult, appreciate rec's     Cerebral Air Embolism  - CT on 3/9/2017 with venous air embolism within the cavernous sinuses and smaller component within the frontal sulci  - EEG normal, unable to get MRI 2/2 ICD  - TTE with bubble shows R to left shunt across the atrial septum  - Stroke team on consult, appreciate rec's     Atrial Flutter, s/p RENÉE/DCCV, currently in sinus rhythm  - INR 2.6 today, up from 1.7 yesterday  - Stop heparin gtt, will hold Coumadin tonight due to big jump and re-assess tomorrow  - Follow up with Dr. Reyez 4 weeks after discharge    SOILA  - Continue home medications Keppra, L-carnitine and CoQ  - Vascular neurology on consult, appreciate rec's     Diet: Cardiac diet with boost  PPx: On heparin gtt while bridging to Coumadin  PT/OT    Code Status: FULL  CODE  Disposition: Still requiring epinephrine gtt with hypotensive episode post-Emmanuel placement. Possible discharge within the next few days if blood pressure stabilizes, will go home with home health and home . Follow up with Dr. Maico Encarnacion at [facility].    Patient seen and examined this morning. Discussed with Dr. Manzo, staff attestation to follow.    Signed:  Mekhi Caro MD  Cardiology Fellow - PGY4  Pager: [phone]  3/18/2017 11:46 AM

## 2017-03-18 NOTE — PROGRESS NOTES
SUBJECTIVE:  Pt seen and examined this AM.  He is POD#1 Emmanuel catheter placement to left subclavian.  Given his heart disease, he required epinephrine infusion post-op and was transferred to the ICU.  He had no acute events overnight and is now off the epinephrine drip.  He is on Dobutamine at 5 mcg/kg/hr.  Only complaints are some mild soreness of the stick site in the left anterior superior chest.      OBJECTIVE:  Temp:  [97 °F (36.1 °C)-99.1 °F (37.3 °C)]   Pulse:  []   Resp:  [2-33]   BP: ()/(37-79)   SpO2:  [88 %-100 %]   Arterial Line BP: (62-88)/(38-70)     NAD  Tachycardia with regular rhythm  Normal respiratory effort on nasal cannula  Catheter incision site and tunnel site appear clean without bleeding or significant tenderness; soft tissue is soft with no evidence of hematoma      I & O (Last 24H):  Intake/Output Summary (Last 24 hours) at 03/18/17 0721  Last data filed at 03/18/17 0600   Gross per 24 hour   Intake           823.36 ml   Output              250 ml   Net           573.36 ml       Lab Results   Component Value Date    WBC 14.54 (H) 03/18/2017    HGB 11.5 (L) 03/18/2017    HCT 33.7 (L) 03/18/2017    MCV 88 03/18/2017     03/18/2017     Lab Results   Component Value Date    CREATININE 3.1 (H) 03/18/2017    BUN 50 (H) 03/18/2017     (L) 03/18/2017    K 5.3 (H) 03/18/2017    CL 95 03/18/2017    CO2 17 (L) 03/18/2017    CALCIUM 9.8 03/18/2017    PHOS 6.7 (H) 03/18/2017    MG 2.0 03/18/2017       ASSESSMENT/PLAN:   Binh Conde is a 36 y.o. male with NICM and CHF, not a candidate for advanced support or transplant, who requires home dobutamine infusion for palliation; POD#1 Emmanuel catheter placement.    -Patient doing well overnight and is now off epinephrine drip  -No issues with catheter overnight and CXR shows it to be in good position  -Continue care per primary team  -will sign off, please call with questions    C. Paul Sixto, MD  PGY-3  Pager: [phone]

## 2017-03-18 NOTE — PROGRESS NOTES
Pt epi requirements increasing. Per MD Pavon, will increase dobutamine gtt to 5 mcg/kg/min and attempt weaning down of epi gtt. WCM.

## 2017-03-18 NOTE — NURSING
Patient AAOx4. Epi turned on at 0800 for SBP <80. Cardiology ok with slow wean on Epi. Keep Dobutamine at 5 mcg. O2 2L NC with sats >95%.  Pt resting comfortably throughout shift. No acute events. Demonstrates proper use of call light. Family updated by MD via telephone. Bed locked in low position. Cont monitoring.

## 2017-03-18 NOTE — PLAN OF CARE
Problem: Patient Care Overview  Goal: Plan of Care Review  Outcome: Ongoing (interventions implemented as appropriate)  Pt stable through night. Epi gtt titrated off. Dobutamine gtt remains at 5mcg/kg/min per MD order. SBP > 80. VSS. Pt updated to plan of care. WCM.

## 2017-03-19 LAB
ALBUMIN SERPL BCP-MCNC: 2.9 G/DL
ALP SERPL-CCNC: 174 U/L
ALT SERPL W/O P-5'-P-CCNC: 49 U/L
ANION GAP SERPL CALC-SCNC: 15 MMOL/L
AST SERPL-CCNC: 555 U/L
BASOPHILS # BLD AUTO: 0.05 K/UL
BASOPHILS NFR BLD: 0.4 %
BILIRUB SERPL-MCNC: 0.9 MG/DL
BUN SERPL-MCNC: 60 MG/DL
CALCIUM SERPL-MCNC: 9.8 MG/DL
CHLORIDE SERPL-SCNC: 96 MMOL/L
CO2 SERPL-SCNC: 17 MMOL/L
CREAT SERPL-MCNC: 2.9 MG/DL
DIFFERENTIAL METHOD: ABNORMAL
EOSINOPHIL # BLD AUTO: 0.1 K/UL
EOSINOPHIL NFR BLD: 0.6 %
ERYTHROCYTE [DISTWIDTH] IN BLOOD BY AUTOMATED COUNT: 15.3 %
EST. GFR  (AFRICAN AMERICAN): 30.8 ML/MIN/1.73 M^2
EST. GFR  (NON AFRICAN AMERICAN): 26.6 ML/MIN/1.73 M^2
GLUCOSE SERPL-MCNC: 152 MG/DL
HCT VFR BLD AUTO: 31.4 %
HGB BLD-MCNC: 11.1 G/DL
INR PPP: 2.6
LYMPHOCYTES # BLD AUTO: 1.6 K/UL
LYMPHOCYTES NFR BLD: 12.7 %
MAGNESIUM SERPL-MCNC: 1.9 MG/DL
MCH RBC QN AUTO: 30.2 PG
MCHC RBC AUTO-ENTMCNC: 35.4 %
MCV RBC AUTO: 85 FL
MONOCYTES # BLD AUTO: 1.3 K/UL
MONOCYTES NFR BLD: 10.3 %
NEUTROPHILS # BLD AUTO: 9.7 K/UL
NEUTROPHILS NFR BLD: 75 %
PHOSPHATE SERPL-MCNC: 5.4 MG/DL
PLATELET # BLD AUTO: 256 K/UL
PMV BLD AUTO: 11.1 FL
POTASSIUM SERPL-SCNC: 5.1 MMOL/L
PROT SERPL-MCNC: 7.3 G/DL
PROTHROMBIN TIME: 26.3 SEC
RBC # BLD AUTO: 3.68 M/UL
SODIUM SERPL-SCNC: 128 MMOL/L
WBC # BLD AUTO: 12.95 K/UL

## 2017-03-19 PROCEDURE — 85610 PROTHROMBIN TIME: CPT

## 2017-03-19 PROCEDURE — 84100 ASSAY OF PHOSPHORUS: CPT

## 2017-03-19 PROCEDURE — 25000003 PHARM REV CODE 250: Performed by: HOSPITALIST

## 2017-03-19 PROCEDURE — 25000003 PHARM REV CODE 250: Performed by: INTERNAL MEDICINE

## 2017-03-19 PROCEDURE — 63600175 PHARM REV CODE 636 W HCPCS: Performed by: INTERNAL MEDICINE

## 2017-03-19 PROCEDURE — 27000221 HC OXYGEN, UP TO 24 HOURS

## 2017-03-19 PROCEDURE — 83735 ASSAY OF MAGNESIUM: CPT

## 2017-03-19 PROCEDURE — 94761 N-INVAS EAR/PLS OXIMETRY MLT: CPT

## 2017-03-19 PROCEDURE — 25000003 PHARM REV CODE 250: Performed by: PHYSICIAN ASSISTANT

## 2017-03-19 PROCEDURE — 20000000 HC ICU ROOM

## 2017-03-19 PROCEDURE — 99233 SBSQ HOSP IP/OBS HIGH 50: CPT | Mod: ,,, | Performed by: INTERNAL MEDICINE

## 2017-03-19 PROCEDURE — 80053 COMPREHEN METABOLIC PANEL: CPT

## 2017-03-19 PROCEDURE — 85025 COMPLETE CBC W/AUTO DIFF WBC: CPT

## 2017-03-19 RX ORDER — HYDRALAZINE HYDROCHLORIDE 10 MG/1
10 TABLET, FILM COATED ORAL
Status: DISCONTINUED | OUTPATIENT
Start: 2017-03-19 | End: 2017-03-20

## 2017-03-19 RX ORDER — WARFARIN SODIUM 5 MG/1
5 TABLET ORAL DAILY
Status: DISCONTINUED | OUTPATIENT
Start: 2017-03-19 | End: 2017-03-21

## 2017-03-19 RX ORDER — ISOSORBIDE DINITRATE 10 MG/1
10 TABLET ORAL
Status: DISCONTINUED | OUTPATIENT
Start: 2017-03-19 | End: 2017-03-20

## 2017-03-19 RX ADMIN — ISOSORBIDE DINITRATE 10 MG: 10 TABLET ORAL at 05:03

## 2017-03-19 RX ADMIN — MIRTAZAPINE 7.5 MG: 7.5 TABLET ORAL at 09:03

## 2017-03-19 RX ADMIN — STANDARDIZED SENNA CONCENTRATE AND DOCUSATE SODIUM 2 TABLET: 8.6; 5 TABLET, FILM COATED ORAL at 09:03

## 2017-03-19 RX ADMIN — Medication 400 MG: at 09:03

## 2017-03-19 RX ADMIN — GABAPENTIN 300 MG: 300 CAPSULE ORAL at 09:03

## 2017-03-19 RX ADMIN — LEVOCARNITINE 3.3 ML: 1 SOLUTION ORAL at 09:03

## 2017-03-19 RX ADMIN — HYDRALAZINE HYDROCHLORIDE 10 MG: 10 TABLET, FILM COATED ORAL at 09:03

## 2017-03-19 RX ADMIN — DOBUTAMINE IN DEXTROSE 5 MCG/KG/MIN: 400 INJECTION, SOLUTION INTRAVENOUS at 03:03

## 2017-03-19 RX ADMIN — ISOSORBIDE DINITRATE 10 MG: 10 TABLET ORAL at 09:03

## 2017-03-19 RX ADMIN — HYDRALAZINE HYDROCHLORIDE 10 MG: 10 TABLET, FILM COATED ORAL at 05:03

## 2017-03-19 RX ADMIN — WARFARIN SODIUM 5 MG: 5 TABLET ORAL at 05:03

## 2017-03-19 RX ADMIN — EPINEPHRINE 0.05 MCG/KG/MIN: 1 INJECTION PARENTERAL at 03:03

## 2017-03-19 RX ADMIN — DOBUTAMINE IN DEXTROSE 5 MCG/KG/MIN: 400 INJECTION, SOLUTION INTRAVENOUS at 12:03

## 2017-03-19 RX ADMIN — BUMETANIDE 2 MG: 1 TABLET ORAL at 09:03

## 2017-03-19 RX ADMIN — Medication 3 ML: at 10:03

## 2017-03-19 RX ADMIN — LEVETIRACETAM 750 MG: 750 TABLET, FILM COATED ORAL at 09:03

## 2017-03-19 NOTE — PLAN OF CARE
Problem: Patient Care Overview  Goal: Plan of Care Review  Outcome: Ongoing (interventions implemented as appropriate)  Pt stable through night. No acute events noted. Dobutamine gtt continued as ordered. Epi gtt titrated as ordered. U/O adequate. VSS. Pt and family updated to plan of care. WCM.

## 2017-03-19 NOTE — PROGRESS NOTES
Progress Note  Cardiology    Admit Date: 3/3/2017   LOS: 16 days     SUBJECTIVE:     Patient seen and examined. Was able to wean off Epi overnight.    Scheduled Meds:   bumetanide  2 mg Oral Daily    coenzyme Q10  400 mg Oral Daily    digoxin  0.125 mg Oral Every Mon, Wed, Fri    gabapentin  300 mg Oral QHS    levetiracetam  750 mg Oral BID    levocarnitine  330 mg Oral BID    mirtazapine  7.5 mg Oral QHS    senna-docusate 8.6-50 mg  2 tablet Oral BID    sodium chloride 0.9%  3 mL Intravenous Q8H     Continuous Infusions:   DOBUTamine 5 mcg/kg/min (03/19/17 0600)    epinephrine infusion Stopped (03/19/17 0400)    heparin (porcine) in D5W Stopped (03/18/17 0933)     PRN Meds:alprazolam, ceFAZolin 2 g/50mL Dextrose IVPB, heparin (PORCINE), heparin (PORCINE)    Review of patient's allergies indicates:   Allergen Reactions    Aspirin     Bactrim [sulfamethoxazole-trimethoprim]     Depakote [divalproex]     Dilantin [phenytoin sodium extended]     Lorazepam     Phenobarbital     Tegretol [carbamazepine]     Thallium-201        OBJECTIVE:     Vital Signs (Most Recent)  Temp: 99.1 °F (37.3 °C) (03/19/17 0300)  Pulse: 94 (03/19/17 0600)  Resp: 14 (03/19/17 0600)  BP: 103/63 (03/19/17 0600)  SpO2: 100 % (03/19/17 0600)    Vital Signs Range (Last 24H):  Temp:  [98.4 °F (36.9 °C)-99.1 °F (37.3 °C)]   Pulse:  [81-99]   Resp:  [4-34]   BP: ()/(57-85)   SpO2:  [87 %-100 %]   Arterial Line BP: (74-96)/(51-69)     I & O (Last 24H):  Intake/Output Summary (Last 24 hours) at 03/19/17 0732  Last data filed at 03/19/17 0600   Gross per 24 hour   Intake          1246.44 ml   Output              650 ml   Net           596.44 ml         Physical Exam:   Constitutional: NAD, conversant  HEENT: Sclera anicteric, PERRLA, EOMI  Neck: 10-12 cm JVD, no carotid bruits  CV: RRR, no murmur, normal S1/S2  Pulm: CTAB, no wheezes, rales, or ronchi  GI: Abdomen soft, NTND, +BS  Extremities: No LE edema, warm and well  perfused  Skin: No ecchymosis, erythema, or ulcers  Psych: AOx3, appropriate affect  Neuro: CNII-XII intact, no focal deficits    LABS  CBC with Diff:     Recent Labs  Lab 03/17/17  0406 03/18/17  0400 03/19/17  0320   WBC 8.98 14.54* 12.95*   HGB 11.9* 11.5* 11.1*   HCT 34.2* 33.7* 31.4*    244 256   LYMPH 11.8*  1.1 10.2*  1.5 12.7*  1.6   MONO 12.1  1.1* 11.1  1.6* 10.3  1.3*   EOSINOPHIL 1.7 0.3 0.6       COAG:    Recent Labs  Lab 03/17/17  0406 03/18/17  0400 03/19/17  0320   INR 1.7* 2.6* 2.6*       CMP:   Recent Labs  Lab 03/17/17  0406 03/17/17  1250 03/18/17  0400 03/19/17  0320   * 289* 144* 152*   CALCIUM 10.0 9.4 9.8 9.8   ALBUMIN 3.2* 2.9* 3.1* 2.9*   PROT 7.8 7.1 7.4 7.3   * 129* 129* 128*   K 4.2 4.0 5.3* 5.1   CO2 21* 15* 17* 17*   CL 95 97 95 96   BUN 38* 39* 50* 60*   CREATININE 2.2* 2.7* 3.1* 2.9*   ALKPHOS 182* 178* 178* 174*   ALT 68* 60* 98* 49*   AST 59* 54* 1085* 555*   BILITOT 0.9 1.0 0.9 0.9   MG 1.8  --  2.0 1.9   PHOS 4.8*  --  6.7* 5.4*     Estimated Creatinine Clearance: 23.6 mL/min (based on Cr of 2.9).    .No results for input(s): CPK, TROPONINI, MB, BNP in the last 168 hours.        Present on Admission:   (Resolved) Cardiogenic shock   MELAS (mitochondrial encephalopathy, lactic acidosis and stroke-like episodes)   Typical atrial flutter   NICM (nonischemic cardiomyopathy)   Seizure disorder   CKD (chronic kidney disease) stage 4, GFR 15-29 ml/min   Thrombocytopenia   Coagulopathy   Transaminitis   Hypoalbuminemia   (Resolved) Flaccid hemiplegia affecting left nondominant side   (Resolved) Cardiogenic shock   Cognitive impairment   Cardiogenic shock   Acute on chronic systolic congestive heart failure      ASSESSMENT / PLAN:   y/o M with PMHx of HFrEF (EF <10%) 2/2 NICM s/p AICD (implanted 12/2013), MELAS (mitochondrial encephalopathy, lactic acidosis, and stroke-like episodes), Hx of LV thrombus, Atrial Flutter, CKD, seizure disorder, who  was transferred to [facility] in cardiogenic shock and consideration for heart transplant and/or advanced options. IABP now out, decreasing support, still on .     Patient has been denied advanced options when presented. Pathway was closed in step 1 due to inability to care for himself and lack of insight.     Plan:   HFrEF (NYHA Functional Class IV, ACC/AHA Stage D) 2/2 NICM, presented in Cardiogenic shock, improving, off IABP, still requiring inotrope; Net +0.5L last 24H, -21.9 L since admission  - Continue  @ 2.5 mcg/kg/min  - Will increase Bumex to 2 mg daily  - Patient now hypotensive post-Emmanuel placement, likely 2/2 anesthetic medications  - weaned off epinephrine , will try to wean off this afternoon  - will restart small dose hydralazine  and ISDN Q6H   - Holding Aldactone 25 mg Daily while on epinephrine gtt  - Continue digoxin 125 mcg MWF  - Not a candidate for advanced options, pathway stopped at Step 1      Leukocytosis, concern for sepsis, improving to 8.9 today  - US with sludge; CXR with no pneumonia  - Blood and Urine Cx NGTD (sent 3/10/2017)  - Completed 7 day course of Vancomycin and Cefepime on 3/16/2017  - ID on consult, appreciate rec's      Cerebral Air Embolism  - CT on 3/9/2017 with venous air embolism within the cavernous sinuses and smaller component within the frontal sulci  - EEG normal, unable to get MRI 2/2 ICD  - TTE with bubble shows R to left shunt across the atrial septum  - Stroke team on consult, appreciate rec's      Atrial Flutter, s/p RENÉE/DCCV, currently in sinus rhythm  - INR 2.6 today, up from 1.7 yesterday  - Stop heparin gtt, will hold Coumadin tonight due to big jump and re-assess tomorrow  - Follow up with Dr. Reyez 4 weeks after discharge     SOILA  - Continue home medications Keppra, L-carnitine and CoQ  - Vascular neurology on consult, appreciate rec's      Diet: Cardiac diet with boost  PPx: On heparin gtt while bridging to Coumadin  PT/OT     Code Status:  FULL CODE  Disposition: Still requiring epinephrine gtt with hypotensive episode post-Emmanuel placement. Possible discharge within the next few days if blood pressure stabilizes, will go home with home health and home . Follow up with Dr. Maico Encarnacion at [facility].      Kash Luong MD  Cardiology Fellow  3/19/2017 7:32 AM

## 2017-03-19 NOTE — PT/OT/SLP DISCHARGE
Occupational Therapy Discharge Summary    Binh Conde  MRN: [id number]   Cardiogenic shock   Patient Discharged from acute Occupational Therapy on 3/18/17  Please refer to prior OT note dated on 3/15/17 for functional status.     Assessment:   Patient was discharge unexpectedly.  Information required to complete and accurate discharge summary is unknown.  Refer to therapy initial evaluation and last progress note for initial and most recent functional status and goal achievement.  Recommendations made may be found in medical record.  GOALS:   Occupational Therapy Goals        Problem: Occupational Therapy Goal    Goal Priority Disciplines Outcome Interventions   Occupational Therapy Goal     OT, PT/OT Ongoing (interventions implemented as appropriate)    Description:  Updated Goals to be met by: 3/22/17     Patient will increase functional independence with ADLs by performing:    UE Dressing with Supervision.  LE Dressing with Supervision.  Grooming while standing at sink with Supervision.  Toileting from toilet with Supervision for hygiene and clothing management.   Toilet transfer to toilet with Supervision.  Upper extremity exercise program x10 reps per handout, with independence.    Goals to be met by: 3/16/17     Patient will increase functional independence with ADLs by performing:    UE Dressing with Supervision.  LE Dressing with Supervision.  Grooming while standing at sink with Supervision.  Toileting from toilet with Supervision for hygiene and clothing management.   Toilet transfer to toilet with Supervision.  Upper extremity exercise program x10 reps per handout, with independence.               Reasons for Discontinuation of Therapy Services  Patient is unable to continue work toward goals because of medical or psychosocial complications.      Plan:  Patient Discharged to: to ICU for epi  administration   JOHN Santiago  3/18/2017  .

## 2017-03-20 PROBLEM — N18.9 ACUTE ON CHRONIC RENAL INSUFFICIENCY: Status: ACTIVE | Noted: 2017-03-20

## 2017-03-20 PROBLEM — N28.9 ACUTE ON CHRONIC RENAL INSUFFICIENCY: Status: ACTIVE | Noted: 2017-03-20

## 2017-03-20 LAB
ALBUMIN SERPL BCP-MCNC: 2.8 G/DL
ALP SERPL-CCNC: 181 U/L
ALT SERPL W/O P-5'-P-CCNC: 37 U/L
ANION GAP SERPL CALC-SCNC: 16 MMOL/L
APTT BLDCRRT: 34 SEC
AST SERPL-CCNC: 270 U/L
BASOPHILS # BLD AUTO: 0.03 K/UL
BASOPHILS NFR BLD: 0.3 %
BILIRUB SERPL-MCNC: 0.8 MG/DL
BUN SERPL-MCNC: 66 MG/DL
CALCIUM SERPL-MCNC: 9.5 MG/DL
CHLORIDE SERPL-SCNC: 94 MMOL/L
CO2 SERPL-SCNC: 18 MMOL/L
CREAT SERPL-MCNC: 2.4 MG/DL
DIFFERENTIAL METHOD: ABNORMAL
EOSINOPHIL # BLD AUTO: 0.2 K/UL
EOSINOPHIL NFR BLD: 1.9 %
ERYTHROCYTE [DISTWIDTH] IN BLOOD BY AUTOMATED COUNT: 15.2 %
EST. GFR  (AFRICAN AMERICAN): 38.7 ML/MIN/1.73 M^2
EST. GFR  (NON AFRICAN AMERICAN): 33.4 ML/MIN/1.73 M^2
FACT X PPP CHRO-ACNC: 0.28 IU/ML
GLUCOSE SERPL-MCNC: 106 MG/DL
HCT VFR BLD AUTO: 30.2 %
HGB BLD-MCNC: 10.4 G/DL
INR PPP: 1.9
LYMPHOCYTES # BLD AUTO: 1.7 K/UL
LYMPHOCYTES NFR BLD: 16.8 %
MAGNESIUM SERPL-MCNC: 2 MG/DL
MCH RBC QN AUTO: 29.8 PG
MCHC RBC AUTO-ENTMCNC: 34.4 %
MCV RBC AUTO: 87 FL
MONOCYTES # BLD AUTO: 0.9 K/UL
MONOCYTES NFR BLD: 9.5 %
NEUTROPHILS # BLD AUTO: 6.9 K/UL
NEUTROPHILS NFR BLD: 70.7 %
PHOSPHATE SERPL-MCNC: 4.1 MG/DL
PLATELET # BLD AUTO: 252 K/UL
PMV BLD AUTO: 11.1 FL
POTASSIUM SERPL-SCNC: 4.6 MMOL/L
PROT SERPL-MCNC: 6.9 G/DL
PROTHROMBIN TIME: 18.8 SEC
RBC # BLD AUTO: 3.49 M/UL
SODIUM SERPL-SCNC: 128 MMOL/L
WBC # BLD AUTO: 9.82 K/UL

## 2017-03-20 PROCEDURE — 97164 PT RE-EVAL EST PLAN CARE: CPT

## 2017-03-20 PROCEDURE — 25000003 PHARM REV CODE 250: Performed by: INTERNAL MEDICINE

## 2017-03-20 PROCEDURE — 97116 GAIT TRAINING THERAPY: CPT

## 2017-03-20 PROCEDURE — 36415 COLL VENOUS BLD VENIPUNCTURE: CPT

## 2017-03-20 PROCEDURE — 83735 ASSAY OF MAGNESIUM: CPT

## 2017-03-20 PROCEDURE — 85025 COMPLETE CBC W/AUTO DIFF WBC: CPT

## 2017-03-20 PROCEDURE — 20600001 HC STEP DOWN PRIVATE ROOM

## 2017-03-20 PROCEDURE — 25000003 PHARM REV CODE 250: Performed by: PHYSICIAN ASSISTANT

## 2017-03-20 PROCEDURE — 85610 PROTHROMBIN TIME: CPT

## 2017-03-20 PROCEDURE — 85520 HEPARIN ASSAY: CPT

## 2017-03-20 PROCEDURE — 80053 COMPREHEN METABOLIC PANEL: CPT

## 2017-03-20 PROCEDURE — 85520 HEPARIN ASSAY: CPT | Mod: 91

## 2017-03-20 PROCEDURE — 85730 THROMBOPLASTIN TIME PARTIAL: CPT

## 2017-03-20 PROCEDURE — 25000003 PHARM REV CODE 250: Performed by: HOSPITALIST

## 2017-03-20 PROCEDURE — 63600175 PHARM REV CODE 636 W HCPCS: Performed by: INTERNAL MEDICINE

## 2017-03-20 PROCEDURE — 84100 ASSAY OF PHOSPHORUS: CPT

## 2017-03-20 PROCEDURE — 99233 SBSQ HOSP IP/OBS HIGH 50: CPT | Mod: ,,, | Performed by: INTERNAL MEDICINE

## 2017-03-20 RX ORDER — HYDRALAZINE HYDROCHLORIDE 25 MG/1
25 TABLET, FILM COATED ORAL
Status: DISCONTINUED | OUTPATIENT
Start: 2017-03-20 | End: 2017-03-21

## 2017-03-20 RX ORDER — HEPARIN SODIUM,PORCINE/D5W 25000/250
17 INTRAVENOUS SOLUTION INTRAVENOUS CONTINUOUS
Status: DISCONTINUED | OUTPATIENT
Start: 2017-03-20 | End: 2017-03-24

## 2017-03-20 RX ORDER — ISOSORBIDE DINITRATE 10 MG/1
20 TABLET ORAL
Status: DISCONTINUED | OUTPATIENT
Start: 2017-03-20 | End: 2017-03-24 | Stop reason: HOSPADM

## 2017-03-20 RX ADMIN — DOBUTAMINE IN DEXTROSE 2.5 MCG/KG/MIN: 400 INJECTION, SOLUTION INTRAVENOUS at 05:03

## 2017-03-20 RX ADMIN — BUMETANIDE 2 MG: 1 TABLET ORAL at 09:03

## 2017-03-20 RX ADMIN — STANDARDIZED SENNA CONCENTRATE AND DOCUSATE SODIUM 2 TABLET: 8.6; 5 TABLET, FILM COATED ORAL at 09:03

## 2017-03-20 RX ADMIN — LEVETIRACETAM 750 MG: 750 TABLET, FILM COATED ORAL at 09:03

## 2017-03-20 RX ADMIN — DIGOXIN 0.12 MG: 125 TABLET ORAL at 04:03

## 2017-03-20 RX ADMIN — GABAPENTIN 300 MG: 300 CAPSULE ORAL at 09:03

## 2017-03-20 RX ADMIN — WARFARIN SODIUM 5 MG: 5 TABLET ORAL at 04:03

## 2017-03-20 RX ADMIN — LEVOCARNITINE 3.3 ML: 1 SOLUTION ORAL at 11:03

## 2017-03-20 RX ADMIN — ISOSORBIDE DINITRATE 10 MG: 10 TABLET ORAL at 07:03

## 2017-03-20 RX ADMIN — HYDRALAZINE HYDROCHLORIDE 25 MG: 25 TABLET ORAL at 09:03

## 2017-03-20 RX ADMIN — Medication 400 MG: at 09:03

## 2017-03-20 RX ADMIN — HYDRALAZINE HYDROCHLORIDE 10 MG: 10 TABLET, FILM COATED ORAL at 07:03

## 2017-03-20 RX ADMIN — LEVOCARNITINE 3.3 ML: 1 SOLUTION ORAL at 09:03

## 2017-03-20 RX ADMIN — ISOSORBIDE DINITRATE 20 MG: 10 TABLET ORAL at 04:03

## 2017-03-20 RX ADMIN — HYDRALAZINE HYDROCHLORIDE 25 MG: 25 TABLET ORAL at 04:03

## 2017-03-20 RX ADMIN — ISOSORBIDE DINITRATE 10 MG: 10 TABLET ORAL at 12:03

## 2017-03-20 RX ADMIN — HEPARIN SODIUM AND DEXTROSE 17 UNITS/KG/HR: 10000; 5 INJECTION INTRAVENOUS at 09:03

## 2017-03-20 RX ADMIN — ISOSORBIDE DINITRATE 20 MG: 10 TABLET ORAL at 09:03

## 2017-03-20 RX ADMIN — HYDRALAZINE HYDROCHLORIDE 10 MG: 10 TABLET, FILM COATED ORAL at 12:03

## 2017-03-20 RX ADMIN — MIRTAZAPINE 7.5 MG: 7.5 TABLET ORAL at 09:03

## 2017-03-20 NOTE — PROGRESS NOTES
Heart Failure Progress Note  Attending Physician: Landon Galeano MD  Hospital Day: 18    Subjective:   Interval History: No events reported overnight. Remains on  @ 5 mcg/kg/min. Otherwise no complaints.    Medications:   Continuous Infusions:   DOBUTamine 2.5 mcg/kg/min (03/20/17 1200)    heparin (porcine) in D5W 17 Units/kg/hr (03/20/17 1200)       Scheduled Meds:   bumetanide  2 mg Oral Daily    coenzyme Q10  400 mg Oral Daily    digoxin  0.125 mg Oral Every Mon, Wed, Fri    gabapentin  300 mg Oral QHS    hydrALAZINE  25 mg Oral QID (WM & HS)    isosorbide dinitrate  20 mg Oral QID (WM & HS)    levetiracetam  750 mg Oral BID    levocarnitine  330 mg Oral BID    mirtazapine  7.5 mg Oral QHS    senna-docusate 8.6-50 mg  2 tablet Oral BID    sodium chloride 0.9%  3 mL Intravenous Q8H    warfarin  5 mg Oral Daily     PRN Meds:alprazolam, ceFAZolin 2 g/50mL Dextrose IVPB  Objective:     Vitals:  Temp:  [97.5 °F (36.4 °C)-98.9 °F (37.2 °C)]   Pulse:  []   Resp:  [11-32]   BP: ()/(51-70)   SpO2:  [97 %-100 %]  I/O's:    Intake/Output Summary (Last 24 hours) at 03/20/17 1249  Last data filed at 03/20/17 1200   Gross per 24 hour   Intake           1017.6 ml   Output             1050 ml   Net            -32.4 ml        Constitutional: NAD, conversant  HEENT: Sclera anicteric, PERRLA, EOMI  Neck: 10-12 cm JVD, no carotid bruits  CV: RRR, no murmur, normal S1/S2  Pulm: CTAB, no wheezes, rales, or ronchi  GI: Abdomen soft, NTND, +BS  Extremities: No LE edema, warm and well perfused  Skin: No ecchymosis, erythema, or ulcers  Psych: AOx3, appropriate affect  Neuro: CNII-XII intact, no focal deficits    Labs:       Recent Labs  Lab 03/18/17  0400 03/19/17  0320 03/20/17  0345   * 128* 128*   K 5.3* 5.1 4.6   CL 95 96 94*   CO2 17* 17* 18*   BUN 50* 60* 66*   CREATININE 3.1* 2.9* 2.4*   * 152* 106   ANIONGAP 17* 15 16       Recent Labs  Lab 03/18/17  0400 03/19/17  0320  03/20/17  0345   AST 1085* 555* 270*   ALT 98* 49* 37   ALKPHOS 178* 174* 181*   BILITOT 0.9 0.9 0.8   ALBUMIN 3.1* 2.9* 2.8*        Recent Labs  Lab 03/18/17  0400 03/19/17  0320 03/20/17  0345   WBC 14.54* 12.95* 9.82   HGB 11.5* 11.1* 10.4*   HCT 33.7* 31.4* 30.2*    256 252   GRAN 77.2*  11.2* 75.0*  9.7* 70.7  6.9       Recent Labs  Lab 03/18/17  0400 03/19/17  0320 03/20/17  0345   INR 2.6* 2.6* 1.9*     No results for input(s): TROPONINI, BNP in the last 168 hours.   Micro:   Blood Cultures  Lab Results   Component Value Date    LABBLOO No growth after 5 days. 03/10/2017    LABBLOO No growth after 5 days. 03/10/2017     Urine Cultures  Lab Results   Component Value Date    LABURIN No growth 03/10/2017       Imaging:   TTE (3/15/2017)  TEST DESCRIPTION   Technical Quality: This is a technically adequate study.     Aorta: The aortic root is normal in size, measuring 2.1 cm at sinotubular junction and 3.1 cm at Sinuses of Valsalva. The proximal ascending aorta is normal in size, measuring 2.5 cm across.     Left Atrium: The left atrial volume index is severely enlarged, measuring 64.37 cc/m2.     Left Ventricle: The left ventricle is normal in size, with an end-diastolic diameter of 6.7 cm, and an end-systolic diameter of 5.9 cm. LV wall thickness is normal, with the septum and the posterior wall each measuring 1.0 cm across. Relative wall thickness was normal at 0.30, and the LV mass index was increased at 219.9 g/m2 consistent with eccentric left ventricular hypertrophy. Global left ventricular systolic function appears severely depressed. Visually estimated ejection fraction is <10%. The LV Doppler derived stroke volume equals 28.0 ccs; at a measured heart rate of 103 bpm this results in a LV Doppler derived cardiac output of 2.9 L/min (CI = 1.78 L/min/m2). There is global hypokinesis.     Right Atrium: The right atrium is enlarged, measuring 6.7 cm in length and 5.3 cm in width in the apical view.      Right Ventricle: The right ventricle is enlarged measuring 5.2 cm at the base in the apical right ventricle-focused view. Global right ventricular systolic function appears low normal to mildly depressed. Tricuspid annular plane systolic excursion (TAPSE) is 2.2 cm. Tissue Doppler-derived tricuspid annular peak systolic velocity (S prime) is 12.4 cm/s.     Aortic Valve:  Aortic valve is normal in structure with normal leaflet mobility.     Mitral Valve:  Mitral valve is normal in structure with normal leaflet mobility.     Tricuspid Valve:  Tricuspid valve is normal in structure with normal leaflet mobility.     Pulmonary Valve:  Pulmonary valve is normal in structure with normal leaflet mobility.     IVC: IVC is enlarged and collapses < 50% with a sniff, suggesting high right atrial pressure of 15 mmHg.     Shunt: This study was performed in conjunction with intravenous bubble contrast. There is evidence of a right to left shunt across the atrial septum.     Intracavitary: There is no evidence of pericardial effusion, intracavity mass, thrombi, or vegetation.     Other: If clinically indicated, a full Doppler study can be performed.     RV/LV ratio is 0.78 (RV/LV diameter ratio > 0.75 is associated with higher risk of RV failure in patients with continuous-flow LVADs).     CONCLUSIONS     1 - Eccentric hypertrophy.     2 - Severely depressed left ventricular systolic function (EF <10%).     3 - Biatrial enlargement.     4 - Right ventricular enlargement with low normal to mildly depressed systolic function.     5 - Increased central venous pressure.    EF   Date Value Ref Range Status   03/14/2017 9 (A) 55 - 65    03/03/2017 10 (A) 55 - 65    03/03/2017 10 (A) 55 - 65      Assessment:   35 y/o M with PMHx of HFrEF (EF <10%) 2/2 NICM s/p AICD (implanted 12/2013), MELAS (mitochondrial encephalopathy, lactic acidosis, and stroke-like episodes), Hx of LV thrombus, Atrial Flutter, CKD, seizure disorder, who was  transferred to [facility] from [facility] in cardiogenic shock and consideration for heart transplant and/or advanced options. IABP now out, decreasing support, still on .    Patient has been denied advanced options when presented. Pathway was closed in step 1 due to inability to care for himself and lack of insight.    Plan:   HFrEF (NYHA Functional Class IV, ACC/AHA Stage D) 2/2 NICM, presented in Cardiogenic shock, improving, off IABP, still requiring inotrope; Net -0.25L last 24H, -21.5 L since admission  - Will reduce  to 2.5 mcg/kg/min  - Continue Bumex at 2 mg Daily  - Hypotension 2/2 sedation from Emmanuel placement now resolved  - Will uptitrate hydralazine to 25 mg and ISDN to 20 mg Q6H  - Continue to hold Aldactone 25 mg Daily with elevated Cr at 2.4 and K 4.6  - Continue digoxin 125 mcg MWF  - Not a candidate for advanced options, pathway stopped at Step 1     Leukocytosis, concern for sepsis, improving to 9.8 today  - US with sludge; CXR with no pneumonia  - Blood and Urine Cx NGTD (sent 3/10/2017)  - Completed 7 day course of Vancomycin and Cefepime on 3/16/2017  - ID on consult, appreciate rec's     Cerebral Air Embolism  - CT on 3/9/2017 with venous air embolism within the cavernous sinuses and smaller component within the frontal sulci  - EEG normal, unable to get MRI 2/2 ICD  - TTE with bubble shows R to left shunt across the atrial septum  - Stroke team on consult, appreciate rec's     Atrial Flutter, s/p RENÉE/DCCV, currently in sinus rhythm  - INR 1.9 today, down from 2.6 yesterday  - Will start heprin gtt again to bridge, continue Coumadin 5 mg tonight and re-assess tomorrow  - Follow up with Dr. Reyez 4 weeks after discharge    SOILA  - Continue home medications Keppra, L-carnitine and CoQ  - Vascular neurology on consult, appreciate rec's     Diet: Cardiac diet with boost  PPx: On heparin gtt while bridging to Coumadin  PT/OT    Code Status: FULL CODE  Disposition: Improving after hypotension over the  weekend. Step down today. Possible discharge tomorrow or Wednesday once INR therapeutic, will go home with home health and home . Follow up with Dr. Maico Encarnacion at [facility].    Patient seen and examined this morning. Discussed with Dr. Manzo, staff attestation to follow.    Signed:  Mekhi Caro MD  Cardiology Fellow - PGY4  Pager: [phone]  3/20/2017 12:55 PM

## 2017-03-20 NOTE — ANESTHESIA POSTPROCEDURE EVALUATION
Anesthesia Post Evaluation    Patient: Binh Conde    Procedure(s) Performed: Procedure(s) (LRB):  INSERTION-CENTRAL LINE.  Emmanuel catheter.  Please have flouroscopy and ultrasound available. (N/A)    Final Anesthesia Type: MAC  Patient location during evaluation: PACU  Patient participation: Yes- Able to Participate  Level of consciousness: lethargic  Post-procedure vital signs: reviewed and not stable  Pain management: adequate  Airway patency: patent  PONV status at discharge: No PONV  Anesthetic complications: yes  Perioperative Events: hypotension requiring unaticipated pressor infusion, prolonged PACU stay - patient condition and unplanned ICU admission    Cardiovascular status: hemodynamically unstable  Respiratory status: face mask  Hydration status: euvolemic  Follow-up not needed.        Visit Vitals    BP 95/69    Pulse 89    Temp 36.4 °C (97.5 °F) (Oral)    Resp 16    Ht 6' (1.829 m)    Wt 46 kg (101 lb 6.6 oz)    SpO2 99%    BMI 13.75 kg/m2       Pain/Daniela Score: Pain Assessment Performed: Yes (3/20/2017  5:00 AM)  Presence of Pain: denies (3/20/2017 10:00 AM)    Pt with EF 10%, ASA4.  Patient with pulse at end of case.  Unable to get NIBP. Tx with pressors, setup to place a-line.  Had a NIBP of 120/90 after vasopressin given.  Transported to PACU.  30 minutes later called to PACU to place a-line and help resuscitate patient.  Dr. Montano placed a-line, started Epi drip.  Pressure ~ 70's, SaO2-99%.  Cardiologist at bedside.

## 2017-03-20 NOTE — PLAN OF CARE
Problem: Patient Care Overview  Goal: Plan of Care Review  Patient transferred to 3rd floor via bed,monitor. Resettled without difficulty.

## 2017-03-20 NOTE — PROGRESS NOTES
"[facility]  Adult Nutrition  Progress Note    SUMMARY     Recommendations    Recommendation/Intervention: Pt reports fair appetite, consuming 25-50% of meals.  Pt is malnourished with a BMI of 13.75.    1. Continue Cardiac diet, FR per MD.     2. Discontinue Boost plus OS, pt refusing it.  Order Boost breeze OS - wildberry TID to supplement PO intake.    3. Recommend TF to meet 75% of needs along with PO diet.  Order TF of Isosource 1.5 at goal rate of 40 ml/hr to provide 1440 kcal, 65 g protein, and 733 ml of free water.   4. Encourage better PO intake.    RD following.     Goals: PO intake >50%.   Nutrition Goal Status: progressing towards goal  Communication of RD Recs: reviewed with RN      Reason for Assessment    Reason for Assessment: RD follow-up  Diagnosis: cardiac disease  Relevent Medical History: cardiogenic shock         General Information Comments: Nutrition Discharge Planning: Adequate PO intake for optimal nutrition.     Nutrition Prescription Ordered    Current Diet Order: Cardiac, FR 1500 ml  Oral Nutrition Supplement: Pt dislikes boost plus       Nutrition Risk Screen     Nutrition Risk Screen: no indicators present    Nutrition/Diet History    Patient Reported Diet/Restrictions/Preferences: general  Typical Food/Fluid Intake: Pt reports an okay appetite, consuming 25-50% of meals.  Pt dislikes boost plus.  Pt had a difficult time communicating, pt stated he weighed 165#, then 140# before "they took the fluid off"  Pt currently weighs 101#.  Pt stated that his sister cooks for him at home and he eats well.  Factors Affecting Nutritional Intake: decreased appetite    Labs/Tests/Procedures/Meds       Pertinent Labs Reviewed: reviewed, pertinent  Pertinent Labs Comments: Na-128, Cl-94, BUN-66, Cr-2.4, Alk phos-181, AST-270, BNP-2508  Pertinent Medications Reviewed: reviewed, pertinent  Pertinent Medications Comments: Mirtazapine, senna docusate, warfarin, dobutamine, " heparin    Physical Findings    Overall Physical Appearance: generalized wasting, loss of muscle mass, loss of subcutaneous fat, underweight     Oral/Mouth Cavity: WDL  Skin: intact    Anthropometrics       Height (inches): 72.01 in  Weight Method: Bed Scale  Weight (kg): 46 kg  Ideal Body Weight (IBW), Male: 178.06 lb     % Ideal Body Weight, Male (lb): 56.95 lb     BMI (kg/m2): 13.75  BMI Grade: less than 16 protein-energy malnutrition grade III       Estimated/Assessed Needs    Weight Used For Calorie Calculations: 46 kg (101 lb 6.6 oz)   Height (cm): 182.9 cm     Energy Need Method: Kcal/kg (3861-7259 kcal/day (35-40 kcal/kg))  RMR ([place]-St. Jeor Equation): 1430.54        Weight Used For Protein Calculations: 46 kg (101 lb 6.6 oz)  Protein Requirements: 70 g/day  Fluid Need Method: RDA Method (or per MD)         Monitor and Evaluation    Food and Nutrient Intake: energy intake, food and beverage intake  Food and Nutrient Adminstration: diet order        Anthropometric Measurements: weight, weight change  Biochemical Data, Medical Tests and Procedures: other (specify) (All labs)  Nutrition-Focused Physical Findings: overall appearance    Nutrition Risk    Level of Risk: high    Nutrition Follow-Up    RD Follow-up?: Yes    Nutrition Diagnosis:    Inadequate energy intake r/t inability to consume adequate intake AEB pt with fair appetite consuming 25-50% of meals, BMI of 13.75

## 2017-03-20 NOTE — PT/OT/SLP DISCHARGE
Physical Therapy Discharge Summary    Binh Conde  MRN: [id number]   Cardiogenic shock   Patient Discharged from acute Physical Therapy on 3/17/17.  Please refer to prior PT noted date on 3/16/17 for functional status.     Assessment:   Patient was discharge unexpectedly.  Information required to complete and accurate discharge summary is unknown.  Refer to therapy initial evaluation and last progress note for initial and most recent functional status and goal achievement.  Recommendations made may be found in medical record.  GOALS:   Physical Therapy Goals        Problem: Physical Therapy Goal    Goal Priority Disciplines Outcome Goal Variances Interventions   Physical Therapy Goal     PT/OT, PT Ongoing (interventions implemented as appropriate)     Description:  Goals to be met by: 3/22/17 - Set at re-eval.    Patient will increase functional independence with mobility by performin. Supine to sit with [place].  2. Sit to stand transfer with [place].  3. Bed to chair transfer with Supervision (with/without use of rolling walker).  4. Gait  x 200 feet with Supervision and use of rolling walker.   5. Lower extremity exercise program x15 reps, with assistance as needed, in order to increase LE strength and (I) with functional mobility.                Reasons for Discontinuation of Therapy Services  Patient is unable to continue work toward goals because of medical or psychosocial complications.      Plan:  Patient Discharged to: ICU.    Mary Morton, PT, DPT   3/20/2017  [phone]

## 2017-03-20 NOTE — PLAN OF CARE
Problem: Fall Risk (Adult)  Goal: Identify Related Risk Factors and Signs and Symptoms  Related risk factors and signs and symptoms are identified upon initiation of Human Response Clinical Practice Guideline (CPG)   Patient up ambulating in the [place] with PT,became weak in the legs,was able to talk and sat in the chair,monitor,sinus. Upon return to bed,lying flat BP 83/systolic with quick return of patients alertness and bp recycled to 91/systolic. Dr Caro notified of  Incidence.

## 2017-03-20 NOTE — PLAN OF CARE
Problem: Patient Care Overview  Goal: Plan of Care Review  Outcome: Ongoing (interventions implemented as appropriate)  Pt stable through night. No acute events noted. Dobut gtt continued as ordered. BP stable. U/O adequate. VSS. Pt updated to plan of care. WCM.

## 2017-03-20 NOTE — NURSING TRANSFER
Nursing Transfer Note      3/20/2017     Transfer To: [id number]    Transfer via bed    Transfer with cardiac monitoring    Transported by rn    Medicines sent: none    Chart send with patient: Yes    Notified: sister    Patient reassessed at: on arrival    Upon arrival to floor: cardiac monitor applied, patient oriented to room, call bell in reach and bed in lowest position

## 2017-03-21 LAB
ALBUMIN SERPL BCP-MCNC: 3 G/DL
ALP SERPL-CCNC: 190 U/L
ALT SERPL W/O P-5'-P-CCNC: 34 U/L
ANION GAP SERPL CALC-SCNC: 17 MMOL/L
ANISOCYTOSIS BLD QL SMEAR: SLIGHT
AST SERPL-CCNC: 150 U/L
BASOPHILS # BLD AUTO: 0.03 K/UL
BASOPHILS NFR BLD: 0.3 %
BILIRUB SERPL-MCNC: 1.1 MG/DL
BUN SERPL-MCNC: 67 MG/DL
CALCIUM SERPL-MCNC: 9.5 MG/DL
CHLORIDE SERPL-SCNC: 91 MMOL/L
CO2 SERPL-SCNC: 19 MMOL/L
CREAT SERPL-MCNC: 2.4 MG/DL
DIFFERENTIAL METHOD: ABNORMAL
EOSINOPHIL # BLD AUTO: 0.1 K/UL
EOSINOPHIL NFR BLD: 1.3 %
ERYTHROCYTE [DISTWIDTH] IN BLOOD BY AUTOMATED COUNT: 15.2 %
EST. GFR  (AFRICAN AMERICAN): 38.7 ML/MIN/1.73 M^2
EST. GFR  (NON AFRICAN AMERICAN): 33.4 ML/MIN/1.73 M^2
FACT X PPP CHRO-ACNC: 0.38 IU/ML
FACT X PPP CHRO-ACNC: 0.53 IU/ML
GLUCOSE SERPL-MCNC: 120 MG/DL
HCT VFR BLD AUTO: 31.2 %
HGB BLD-MCNC: 10.4 G/DL
HYPOCHROMIA BLD QL SMEAR: ABNORMAL
INR PPP: 1.7
LYMPHOCYTES # BLD AUTO: 1.8 K/UL
LYMPHOCYTES NFR BLD: 20.2 %
MAGNESIUM SERPL-MCNC: 2 MG/DL
MCH RBC QN AUTO: 29.8 PG
MCHC RBC AUTO-ENTMCNC: 33.3 %
MCV RBC AUTO: 89 FL
MONOCYTES # BLD AUTO: 0.8 K/UL
MONOCYTES NFR BLD: 8.6 %
NEUTROPHILS # BLD AUTO: 6 K/UL
NEUTROPHILS NFR BLD: 69.6 %
OVALOCYTES BLD QL SMEAR: ABNORMAL
PHOSPHATE SERPL-MCNC: 4.4 MG/DL
PLATELET # BLD AUTO: 302 K/UL
PMV BLD AUTO: 11.3 FL
POIKILOCYTOSIS BLD QL SMEAR: SLIGHT
POLYCHROMASIA BLD QL SMEAR: ABNORMAL
POTASSIUM SERPL-SCNC: 4.3 MMOL/L
PROT SERPL-MCNC: 7.1 G/DL
PROTHROMBIN TIME: 17.6 SEC
RBC # BLD AUTO: 3.49 M/UL
SODIUM SERPL-SCNC: 127 MMOL/L
WBC # BLD AUTO: 8.77 K/UL

## 2017-03-21 PROCEDURE — 85025 COMPLETE CBC W/AUTO DIFF WBC: CPT

## 2017-03-21 PROCEDURE — 84100 ASSAY OF PHOSPHORUS: CPT

## 2017-03-21 PROCEDURE — 80053 COMPREHEN METABOLIC PANEL: CPT

## 2017-03-21 PROCEDURE — 97530 THERAPEUTIC ACTIVITIES: CPT

## 2017-03-21 PROCEDURE — 20600001 HC STEP DOWN PRIVATE ROOM

## 2017-03-21 PROCEDURE — 25000003 PHARM REV CODE 250: Performed by: INTERNAL MEDICINE

## 2017-03-21 PROCEDURE — 97116 GAIT TRAINING THERAPY: CPT

## 2017-03-21 PROCEDURE — 85520 HEPARIN ASSAY: CPT

## 2017-03-21 PROCEDURE — 36415 COLL VENOUS BLD VENIPUNCTURE: CPT

## 2017-03-21 PROCEDURE — 83735 ASSAY OF MAGNESIUM: CPT

## 2017-03-21 PROCEDURE — 99232 SBSQ HOSP IP/OBS MODERATE 35: CPT | Mod: ,,, | Performed by: INTERNAL MEDICINE

## 2017-03-21 PROCEDURE — 85610 PROTHROMBIN TIME: CPT

## 2017-03-21 PROCEDURE — 25000003 PHARM REV CODE 250: Performed by: PHYSICIAN ASSISTANT

## 2017-03-21 RX ORDER — WARFARIN 7.5 MG/1
7.5 TABLET ORAL DAILY
Status: DISCONTINUED | OUTPATIENT
Start: 2017-03-21 | End: 2017-03-22

## 2017-03-21 RX ORDER — HYDRALAZINE HYDROCHLORIDE 50 MG/1
50 TABLET, FILM COATED ORAL
Status: DISCONTINUED | OUTPATIENT
Start: 2017-03-21 | End: 2017-03-24 | Stop reason: HOSPADM

## 2017-03-21 RX ADMIN — WARFARIN SODIUM 7.5 MG: 7.5 TABLET ORAL at 04:03

## 2017-03-21 RX ADMIN — LEVETIRACETAM 750 MG: 750 TABLET, FILM COATED ORAL at 10:03

## 2017-03-21 RX ADMIN — HYDRALAZINE HYDROCHLORIDE 50 MG: 50 TABLET ORAL at 04:03

## 2017-03-21 RX ADMIN — BUMETANIDE 2 MG: 1 TABLET ORAL at 09:03

## 2017-03-21 RX ADMIN — ISOSORBIDE DINITRATE 20 MG: 10 TABLET ORAL at 11:03

## 2017-03-21 RX ADMIN — ISOSORBIDE DINITRATE 20 MG: 10 TABLET ORAL at 10:03

## 2017-03-21 RX ADMIN — HYDRALAZINE HYDROCHLORIDE 50 MG: 50 TABLET ORAL at 10:03

## 2017-03-21 RX ADMIN — STANDARDIZED SENNA CONCENTRATE AND DOCUSATE SODIUM 2 TABLET: 8.6; 5 TABLET, FILM COATED ORAL at 09:03

## 2017-03-21 RX ADMIN — HEPARIN SODIUM AND DEXTROSE 19 UNITS/KG/HR: 10000; 5 INJECTION INTRAVENOUS at 10:03

## 2017-03-21 RX ADMIN — HEPARIN SODIUM AND DEXTROSE 19 UNITS/KG/HR: 10000; 5 INJECTION INTRAVENOUS at 05:03

## 2017-03-21 RX ADMIN — MIRTAZAPINE 7.5 MG: 7.5 TABLET ORAL at 10:03

## 2017-03-21 RX ADMIN — LEVOCARNITINE 3.3 ML: 1 SOLUTION ORAL at 10:03

## 2017-03-21 RX ADMIN — HYDRALAZINE HYDROCHLORIDE 25 MG: 25 TABLET ORAL at 11:03

## 2017-03-21 RX ADMIN — HYDRALAZINE HYDROCHLORIDE 25 MG: 25 TABLET ORAL at 09:03

## 2017-03-21 RX ADMIN — ISOSORBIDE DINITRATE 20 MG: 10 TABLET ORAL at 09:03

## 2017-03-21 RX ADMIN — LEVETIRACETAM 750 MG: 750 TABLET, FILM COATED ORAL at 09:03

## 2017-03-21 RX ADMIN — Medication 400 MG: at 09:03

## 2017-03-21 RX ADMIN — GABAPENTIN 300 MG: 300 CAPSULE ORAL at 10:03

## 2017-03-21 RX ADMIN — ISOSORBIDE DINITRATE 20 MG: 10 TABLET ORAL at 04:03

## 2017-03-21 RX ADMIN — LEVOCARNITINE 3.3 ML: 1 SOLUTION ORAL at 09:03

## 2017-03-21 NOTE — PLAN OF CARE
Problem: Physical Therapy Goal  Goal: Physical Therapy Goal  Goals to be met by: 4/3/17 - Set at re-eval.    Patient will increase functional independence with mobility by performin. Supine to sit with [place].  2. Sit to stand transfer with [place].  3. Bed to chair transfer with Supervision (with/without use of rolling walker).  4. Gait x 200 feet with Supervision and use of rolling walker.   5. Lower extremity exercise program x15 reps, with assistance as needed, in order to increase LE strength and (I) with functional mobility.    Outcome: Revised  Goals remain appropriate, however require extension and renewal this date

## 2017-03-21 NOTE — PT/OT/SLP PROGRESS
Occupational Therapy      Binh Conde  MRN: [id number]    Patient not seen today secondary to Other (pt declined due to trying to take a nap). Will follow-up as scheduled.    JOHN Blackburn  3/21/2017

## 2017-03-21 NOTE — PLAN OF CARE
Problem: Patient Care Overview  Goal: Plan of Care Review  Outcome: Ongoing (interventions implemented as appropriate)  Pt remained stable throughout the night. No acute distress noted. Pt remained free from injury. VSS. Pt denies any chest pain or SOB.  2.5mcg, going home on . Heparin gtt INR 1.9 needs to be between 2.5-3.5. D.c once therapeutic. Pt understands plan of care. Will continue to monitor.

## 2017-03-21 NOTE — PLAN OF CARE
Problem: Physical Therapy Goal  Goal: Physical Therapy Goal  Goals to be met by: 4/3/17 - Set at re-eval.    Patient will increase functional independence with mobility by performin. Supine to sit with [place].  2. Sit to stand transfer with [place].  3. Bed to chair transfer with Supervision (with/without use of rolling walker).  4. Gait x 200 feet with Supervision and use of rolling walker.   5. Lower extremity exercise program x15 reps, with assistance as needed, in order to increase LE strength and (I) with functional mobility.    Outcome: Ongoing (interventions implemented as appropriate)  Goals reviewed and remain appropriate. Pt progressing towards goals.     Mary Morton, PT, DPT   3/21/2017  [phone]

## 2017-03-21 NOTE — PT/OT/SLP RE-EVAL
Physical Therapy  Re-evaluation    Binh Conde   MRN: [id number]   Admitting Diagnosis: Cardiogenic shock    PT Received On: 03/20/17  PT Start Time: 1355     PT Stop Time: 1422    PT Total Time (min): 27 min       Billable Minutes:  Re-eval 12 mins and Gait [id number] mins    Diagnosis: Cardiogenic shock  S/p Emmanuel with transfer to ICU    Past Medical History:   Diagnosis Date    Acute on chronic systolic congestive heart failure 3/16/2017    Dr. Encarnacion spoke with Dr. Encarnacion and his sister Shiloh by phone 3/16/17 and explained concerns regarding advanced options (Pathway stopped 3/3/17 and discussed by [facility] physicians as group 3/15/17) Discussed his prognosis with death rate estimated at 50% at 1 year and 80% at 3 years Dr. Encarnacion will follow as outpatient and agrees with [facility] for palliation and [place]olo Sister plans for patient to move in wi    CHF (congestive heart failure)     Hypertension     Seizures       History reviewed. No pertinent surgical history.    General Precautions: Standard, fall, seizure  Orthopedic Precautions: N/A     Do you have any cultural, spiritual, Latter day conflicts, given your current situation?: Bahai    Patient History:  Lives With: alone (But will be going to live with his sisters upon discharge.)  Living Arrangements: apartment  Home Layout: Able to live on 1st floor  Transportation Available: family or friend will provide  Living Environment Comment: Pt lives alone but will be discharging to his sister's house. He has two sisters, so he will not be left alone once he discharges.  He was (I) PTA, not driving or working. Has had 2 strokes previously, one in 2001.  Unsure of bathroom situation at his sister's house (think pt did not understand the question).  Equipment Currently Used at Home: none    Previous Level of Function:  Ambulation Skills: independent  Transfer Skills: independent  ADL Skills: independent  Work/Leisure Activity: independent    Subjective:  Communicated  "with RN prior to session.  Pt agreeable to session  Chief Complaint: being tired  Patient goals: pt does not state    Pain Ratin/10   Pain Rating Post-Intervention: 0/10    Objective:   Patient found with: telemetry, peripheral IV, central line, blood pressure cuff     Cognitive Exam:  Oriented to: Person and Place    Follows Commands/attention: Follows one-step commands and Easily distracted  Communication: clear/fluent  Safety awareness/insight to disability: impaired    Physical Exam:  Postural examination/scapula alignment: Rounded shoulder and Head forward    Skin integrity: Visible skin intact    Lower Extremity Range of Motion:  Right Lower Extremity: WFL  Left Lower Extremity: WFL    Lower Extremity Strength:  Right Lower Extremity: WFL  Left Lower Extremity: WFL    Gross motor coordination: WFL    Functional Mobility:  Bed Mobility:  Scooting: Stand by Assistance  Supine to Sit: Stand by Assistance  Sit to Supine: Stand by Assistance    Transfers:  Sit <> Stand Assistance: Contact Guard Assistance (from EOB and Mod A from computer chair)  Sit <> Stand Assistive Device: No Assistive Device (RW from EOB and no AD from computer chair)     Gait:   Gait Distance: ~250 feet   Assistance 1: CGA (mod A with knee buckling and to sit on computer chair)  Gait Assistive Device: Rolling walker  Gait Pattern: swing-through gait  Gait Deviation(s): decreased benedict; increased time in double stance; decreased step length; decreased weight-shifting ability; foot flat; decreased toe-to-floor clearance    Balance:   Static Sit: GOOD-: Takes MODERATE challenges from all directions but inconsistently  Dynamic Sit: FAIR+: Maintains balance through MINIMAL excursions of active trunk motion  Static Stand: FAIR: Maintains without assist but unable to take challenges  Dynamic stand: POOR: N/A    Therapeutic Activities and Exercises:  Pt noted to have 1 episode of knee buckling/"jerking" type motion during session, requiring sit " break. Pt however is noted to verbal and tactile cues to remain within RW and for safety with ambulation. Pt is noted to attempt to increase benedict with decrease in stability noted.     AM-PAC 6 CLICK MOBILITY  How much help from another person does this patient currently need?   1 = Unable, Total/Dependent Assistance  2 = A lot, Maximum/Moderate Assistance  3 = A little, Minimum/Contact Guard/Supervision  4 = None, Modified [place]/Independent    Turning over in bed (including adjusting bedclothes, sheets and blankets)?: 4  Sitting down on and standing up from a chair with arms (e.g., wheelchair, bedside commode, etc.): 3  Moving from lying on back to sitting on the side of the bed?: 3  Moving to and from a bed to a chair (including a wheelchair)?: 3  Need to walk in hospital room?: 3  Climbing 3-5 steps with a railing?: 1  Total Score: 17     AM-PAC Raw Score CMS G-Code Modifier Level of Impairment Assistance   6 % Total / Unable   7 - 9 CM 80 - 100% Maximal Assist   10 - 14 CL 60 - 80% Moderate Assist   15 - 19 CK 40 - 60% Moderate Assist   20 - 22 CJ 20 - 40% Minimal Assist   23 CI 1-20% SBA / CGA   24 CH 0% Independent/ Mod I     Patient left supine with all lines intact, call button in reach and RN present.    Assessment:   Binh Conde is a 36 y.o. male with a medical diagnosis of Cardiogenic shock and presents with deficits listed below. Pt is noted to have knee buckling/weakness and monitor showing sinus with RN (Haider) able to confirm. Pt is noted once returned to room to have decreased BP that increased with return to supine (please see nursing notes for exact measurements). Nursing remained present with pt following session. Pt will need skilled PT to address deficits and increase functional mobility as able.    Rehab identified problem list/impairments: Rehab identified problem list/impairments: weakness, impaired endurance, impaired functional mobilty, gait instability, impaired  balance, decreased coordination, decreased upper extremity function, decreased lower extremity function, impaired cognition, decreased safety awareness, impaired cardiopulmonary response to activity    Rehab potential is good.    Activity tolerance: Good    Discharge recommendations: Discharge Facility/Level Of Care Needs: home with home health (with 24 hour supervision)     Barriers to discharge: Barriers to Discharge: Decreased caregiver support    Equipment recommendations: Equipment Needed After Discharge: bath bench, walker, rolling     GOALS:   Physical Therapy Goals        Problem: Physical Therapy Goal    Goal Priority Disciplines Outcome Goal Variances Interventions   Physical Therapy Goal     PT/OT, PT Revised     Description:  Goals to be met by: 4/3/17 - Set at re-eval.    Patient will increase functional independence with mobility by performin. Supine to sit with [place].  2. Sit to stand transfer with [place].  3. Bed to chair transfer with Supervision (with/without use of rolling walker).  4. Gait  x 200 feet with Supervision and use of rolling walker.   5. Lower extremity exercise program x15 reps, with assistance as needed, in order to increase LE strength and (I) with functional mobility.                   PLAN:    Patient to be seen 4 x/week to address the above listed problems via gait training, therapeutic activities, therapeutic exercises, neuromuscular re-education  Plan of Care expires: 17  Plan of Care reviewed with: patient          Marge Nicholson, PT  2017

## 2017-03-21 NOTE — PLAN OF CARE
Problem: Patient Care Overview  Goal: Plan of Care Review  Outcome: Ongoing (interventions implemented as appropriate)  Patient progressing toward all goals. Plan of care discussed with patient, no questions at this time. Patient ambulating with assist, fall precautions in place. Pt instructed to ot get out of the bed without calling for help; VS numbers WNL. Will monitor.

## 2017-03-21 NOTE — PROGRESS NOTES
Heart Failure Progress Note  Attending Physician: Landon Galeano MD  Hospital Day: 19    Subjective:   Interval History: Stepped down to floor yesterday. No events reported overnight. Remains on  @ 2.5 mcg/kg/min. Otherwise no complaints.    Medications:   Continuous Infusions:   DOBUTamine 2.5 mcg/kg/min (03/20/17 1717)    heparin (porcine) in D5W 19 Units/kg/hr (03/21/17 0550)       Scheduled Meds:   bumetanide  2 mg Oral Daily    coenzyme Q10  400 mg Oral Daily    digoxin  0.125 mg Oral Every Mon, Wed, Fri    gabapentin  300 mg Oral QHS    hydrALAZINE  50 mg Oral QID (WM & HS)    isosorbide dinitrate  20 mg Oral QID (WM & HS)    levetiracetam  750 mg Oral BID    levocarnitine  330 mg Oral BID    mirtazapine  7.5 mg Oral QHS    senna-docusate 8.6-50 mg  2 tablet Oral BID    sodium chloride 0.9%  3 mL Intravenous Q8H    warfarin  7.5 mg Oral Daily     PRN Meds:alprazolam, ceFAZolin 2 g/50mL Dextrose IVPB  Objective:     Vitals:  Temp:  [97.8 °F (36.6 °C)-99.5 °F (37.5 °C)]   Pulse:  []   Resp:  [14-26]   BP: (83-97)/(50-71)   SpO2:  [97 %-98 %]  I/O's:    Intake/Output Summary (Last 24 hours) at 03/21/17 1328  Last data filed at 03/21/17 0600   Gross per 24 hour   Intake           631.21 ml   Output              750 ml   Net          -118.79 ml        Constitutional: NAD, conversant  HEENT: Sclera anicteric, PERRLA, EOMI  Neck: 10-12 cm JVD, no carotid bruits  CV: RRR, no murmur, normal S1/S2  Pulm: CTAB, no wheezes, rales, or ronchi  GI: Abdomen soft, NTND, +BS  Extremities: No LE edema, warm and well perfused  Skin: No ecchymosis, erythema, or ulcers  Psych: AOx3, appropriate affect  Neuro: CNII-XII intact, no focal deficits    Labs:       Recent Labs  Lab 03/19/17  0320 03/20/17  0345 03/21/17  0527   * 128* 127*   K 5.1 4.6 4.3   CL 96 94* 91*   CO2 17* 18* 19*   BUN 60* 66* 67*   CREATININE 2.9* 2.4* 2.4*   * 106 120*   ANIONGAP 15 16 17*       Recent Labs  Lab  03/19/17 0320 03/20/17 0345 03/21/17  0527   * 270* 150*   ALT 49* 37 34   ALKPHOS 174* 181* 190*   BILITOT 0.9 0.8 1.1*   ALBUMIN 2.9* 2.8* 3.0*        Recent Labs  Lab 03/19/17 0320 03/20/17 0345 03/21/17  0527   WBC 12.95* 9.82 8.77   HGB 11.1* 10.4* 10.4*   HCT 31.4* 30.2* 31.2*    252 302   GRAN 75.0*  9.7* 70.7  6.9 69.6  6.0       Recent Labs  Lab 03/19/17 0320 03/20/17 0345 03/21/17  0527   INR 2.6* 1.9* 1.7*     No results for input(s): TROPONINI, BNP in the last 168 hours.   Micro:   Blood Cultures  Lab Results   Component Value Date    LABBLOO No growth after 5 days. 03/10/2017    LABBLOO No growth after 5 days. 03/10/2017     Urine Cultures  Lab Results   Component Value Date    LABURIN No growth 03/10/2017       Imaging:   TTE (3/15/2017)  TEST DESCRIPTION   Technical Quality: This is a technically adequate study.     Aorta: The aortic root is normal in size, measuring 2.1 cm at sinotubular junction and 3.1 cm at Sinuses of Valsalva. The proximal ascending aorta is normal in size, measuring 2.5 cm across.     Left Atrium: The left atrial volume index is severely enlarged, measuring 64.37 cc/m2.     Left Ventricle: The left ventricle is normal in size, with an end-diastolic diameter of 6.7 cm, and an end-systolic diameter of 5.9 cm. LV wall thickness is normal, with the septum and the posterior wall each measuring 1.0 cm across. Relative wall thickness was normal at 0.30, and the LV mass index was increased at 219.9 g/m2 consistent with eccentric left ventricular hypertrophy. Global left ventricular systolic function appears severely depressed. Visually estimated ejection fraction is <10%. The LV Doppler derived stroke volume equals 28.0 ccs; at a measured heart rate of 103 bpm this results in a LV Doppler derived cardiac output of 2.9 L/min (CI = 1.78 L/min/m2). There is global hypokinesis.     Right Atrium: The right atrium is enlarged, measuring 6.7 cm in length and 5.3 cm in  width in the apical view.     Right Ventricle: The right ventricle is enlarged measuring 5.2 cm at the base in the apical right ventricle-focused view. Global right ventricular systolic function appears low normal to mildly depressed. Tricuspid annular plane systolic excursion (TAPSE) is 2.2 cm. Tissue Doppler-derived tricuspid annular peak systolic velocity (S prime) is 12.4 cm/s.     Aortic Valve:  Aortic valve is normal in structure with normal leaflet mobility.     Mitral Valve:  Mitral valve is normal in structure with normal leaflet mobility.     Tricuspid Valve:  Tricuspid valve is normal in structure with normal leaflet mobility.     Pulmonary Valve:  Pulmonary valve is normal in structure with normal leaflet mobility.     IVC: IVC is enlarged and collapses < 50% with a sniff, suggesting high right atrial pressure of 15 mmHg.     Shunt: This study was performed in conjunction with intravenous bubble contrast. There is evidence of a right to left shunt across the atrial septum.     Intracavitary: There is no evidence of pericardial effusion, intracavity mass, thrombi, or vegetation.     Other: If clinically indicated, a full Doppler study can be performed.     RV/LV ratio is 0.78 (RV/LV diameter ratio > 0.75 is associated with higher risk of RV failure in patients with continuous-flow LVADs).     CONCLUSIONS     1 - Eccentric hypertrophy.     2 - Severely depressed left ventricular systolic function (EF <10%).     3 - Biatrial enlargement.     4 - Right ventricular enlargement with low normal to mildly depressed systolic function.     5 - Increased central venous pressure.    EF   Date Value Ref Range Status   03/14/2017 9 (A) 55 - 65    03/03/2017 10 (A) 55 - 65    03/03/2017 10 (A) 55 - 65      Assessment:   35 y/o M with PMHx of HFrEF (EF <10%) 2/2 NICM s/p AICD (implanted 12/2013), MELAS (mitochondrial encephalopathy, lactic acidosis, and stroke-like episodes), Hx of LV thrombus, Atrial Flutter, CKD,  seizure disorder, who [place] [facility] [place] [facility] in cardiogenic shock and consideration for heart transplant and/or advanced options. IABP now out, decreasing support, still on .    Patient has been denied advanced options when presented. Pathway was closed in step 1 due to inability to care for himself and lack of insight.    Plan:   HFrEF (NYHA Functional Class IV, ACC/AHA Stage D) 2/2 NICM, presented in Cardiogenic shock, improving, off IABP, still requiring inotrope; Net -0.25L last 24H, -21.5 L since admission  - Continue  @ 2.5 mcg/kg/min  - Continue Bumex at 2 mg Daily  - Will uptitrate hydralazine to 50 mg, continue ISDN at 20 mg Q6H  - Continue to hold Aldactone 25 mg Daily with elevated Cr at 2.4 and K 4.3  - Continue digoxin 125 mcg MWF  - Not a candidate for advanced options, pathway stopped at Step 1     Leukocytosis, concern for sepsis, improving to 8.7 today  - US with sludge; CXR with no pneumonia  - Blood and Urine Cx NGTD (sent 3/10/2017)  - Completed 7 day course of Vancomycin and Cefepime on 3/16/2017  - ID on consult, appreciate rec's     Cerebral Air Embolism  - CT on 3/9/2017 with venous air embolism within the cavernous sinuses and smaller component within the frontal sulci  - EEG normal, unable to get MRI 2/2 ICD  - TTE with bubble shows R to left shunt across the atrial septum  - Stroke team on consult, appreciate rec's     Atrial Flutter, s/p RENÉE/DCCV, currently in sinus rhythm  - INR 1.7 today, down from 1.9 yesterday  - Continue heparin gtt to bridge, Will increase Coumadin to 7.5 mg tonight (from 5 mg) and re-assess tomorrow  - Follow up with Dr. Reyez 4 weeks after discharge    SOILA  - Continue home medications Keppra, L-carnitine and CoQ  - Vascular neurology on consult, appreciate rec's     Diet: Cardiac diet with boost  PPx: On heparin gtt while bridging to Coumadin  PT/OT    Code Status: FULL CODE  Disposition: Possible discharge tomorrow or Thursday once INR  therapeutic, will go home with home health and home . Follow up with Dr. Maico Encarnacion at [facility].    Patient seen and examined this morning. Discussed with Dr. Manzo, staff attestation to follow.    Signed:  Mekhi Caro MD  Cardiology Fellow - PGY4  Pager: [phone]  3/21/2017 1:31 PM

## 2017-03-21 NOTE — PT/OT/SLP PROGRESS
"Physical Therapy  Treatment    Binh Conde   MRN: [id number]   Admitting Diagnosis: Cardiogenic shock    PT Received On: 17  PT Start Time: 08     PT Stop Time: 910    PT Total Time (min): 33 min       Billable Minutes:  Gait [id number] and Therapeutic Activity 18    Treatment Type: Treatment  PT/PTA: PT     PTA Visit Number: 0       General Precautions: Standard, fall, seizure  Orthopedic Precautions: N/A   Braces: N/A    Do you have any cultural, spiritual, Christian conflicts, given your current situation?: Worship    Subjective:  Communicated with RN prior to session.  "I just finished my breakfast." "My hands are sweaty."     Pain Ratin/10    Objective:   Patient found with: telemetry, peripheral IV    Functional Mobility:  Bed Mobility:   Supine to Sit: Supervision  Sit to Supine: Stand by Assistance    Transfers:  Sit <> Stand Assistance: Stand By Assistance (x1 rep from EOB; x1 rep from computer chair; x1 rep from w/c)  Sit <> Stand Assistive Device: Rolling Walker, No Assistive Device (x1 rep with RW from EOB; x1 rep from computer chair without AD; x1 rep from w/c without AD)  Bed <> Chair Technique: Stand Pivot  Bed <> Chair Assistance: Minimum Assistance  Bed <> Chair Assistive Device:  (HHA)    Gait:   Gait Distance: ~250 ft.   Assistance 1: Contact Guard Assistance, Moderate assistance (mostly CGA but then with modA to maintain standing balance during episode of B knee buckling)  Gait Assistive Device: Rolling walker  Gait Pattern: swing-through gait  Gait Deviation(s): decreased benedict, decreased step length, foot flat, decreased velocity of limb motion, increased time in double stance   Min v/c and t/c for RW management with increased difficulty navigating in open environment (hallway).    During ambulation, pt suddenly with episode of multiple B knee buckling, requiring modA to maintain standing balance, and decreased responsiveness. RNs called to assist, who brought computer chair " and assisted lowering pt to sit. NSR noted on monitor. Maintained eyes open throughout. Unable to obtain seated BP. Once seated, pt responding to questions and oriented to person and place. Pt unaware of episode when questioned about it afterwards.       Balance:   Static Sit: supervision   Dynamic Sit: supervision  Static Stand: SBA-CGA  Dynamic stand: CGA      Therapeutic Activities and Exercises:  TIMOTHY Villagran notified and assisted PT to return pt to room in w/c.    Supine BP following ambulation: 89/58  Pt oriented to call bell and educated on the importance of having staff member present for all OOB activity.     AM-PAC 6 CLICK MOBILITY  How much help from another person does this patient currently need?   1 = Unable, Total/Dependent Assistance  2 = A lot, Maximum/Moderate Assistance  3 = A little, Minimum/Contact Guard/Supervision  4 = None, Modified [place]/Independent    Turning over in bed (including adjusting bedclothes, sheets and blankets)?: 4  Sitting down on and standing up from a chair with arms (e.g., wheelchair, bedside commode, etc.): 3  Moving from lying on back to sitting on the side of the bed?: 3  Moving to and from a bed to a chair (including a wheelchair)?: 3  Need to walk in hospital room?: 3  Climbing 3-5 steps with a railing?: 1  Total Score: 17    AM-PAC Raw Score CMS G-Code Modifier Level of Impairment Assistance   6 % Total / Unable   7 - 9 CM 80 - 100% Maximal Assist   10 - 14 CL 60 - 80% Moderate Assist   15 - 19 CK 40 - 60% Moderate Assist   20 - 22 CJ 20 - 40% Minimal Assist   23 CI 1-20% SBA / CGA   24 CH 0% Independent/ Mod I     Patient left supine with all lines intact, call button in reach and RN present.    Assessment:  Binh Conde is a 36 y.o. male with a medical diagnosis of Cardiogenic shock and presents h/o of cognitive impairment and MELAS. Pt again experienced episode of seizure-like activity with B knee buckling and jerky movement during ambulation.  Further functional mobility limited by this episode, requiring seated rest and return to room in w/c. Prior to this episode, pt was able to perform functional mobility without physical assist or LOB. Pt would continue to benefit from skilled IP PT in order to address current deficits and progress functional mobility. Recommend chair-follow for all future gait training for increased safety.     Rehab identified problem list/impairments: Rehab identified problem list/impairments: weakness, impaired self care skills, impaired balance, decreased coordination, decreased safety awareness, impaired endurance, impaired functional mobilty, gait instability, impaired cognition, impaired cardiopulmonary response to activity    Rehab potential is good.    Activity tolerance: Good    Discharge recommendations: Discharge Facility/Level Of Care Needs: home health PT, home health OT     Barriers to discharge: Barriers to Discharge: Decreased caregiver support    Equipment recommendations: Equipment Needed After Discharge: bath bench, walker, rolling     GOALS:   Physical Therapy Goals        Problem: Physical Therapy Goal    Goal Priority Disciplines Outcome Goal Variances Interventions   Physical Therapy Goal     PT/OT, PT Ongoing (interventions implemented as appropriate)     Description:  Goals to be met by: 4/3/17 - Set at re-eval.    Patient will increase functional independence with mobility by performin. Supine to sit with [place].  2. Sit to stand transfer with [place].  3. Bed to chair transfer with Supervision (with/without use of rolling walker).  4. Gait  x 200 feet with Supervision and use of rolling walker.   5. Lower extremity exercise program x15 reps, with assistance as needed, in order to increase LE strength and (I) with functional mobility.                   PLAN:    Patient to be seen 4 x/week  to address the above listed problems via gait training, therapeutic activities, therapeutic  exercises, neuromuscular re-education  Plan of Care expires: 04/20/17  Plan of Care reviewed with: patient        Mary Praveen, PT, DPT   3/21/2017  [phone]

## 2017-03-22 LAB
ALBUMIN SERPL BCP-MCNC: 3 G/DL
ALP SERPL-CCNC: 186 U/L
ALT SERPL W/O P-5'-P-CCNC: 32 U/L
ANION GAP SERPL CALC-SCNC: 14 MMOL/L
AST SERPL-CCNC: 100 U/L
BASOPHILS # BLD AUTO: 0.04 K/UL
BASOPHILS NFR BLD: 0.4 %
BILIRUB SERPL-MCNC: 1 MG/DL
BUN SERPL-MCNC: 65 MG/DL
CALCIUM SERPL-MCNC: 9.4 MG/DL
CHLORIDE SERPL-SCNC: 92 MMOL/L
CO2 SERPL-SCNC: 20 MMOL/L
CREAT SERPL-MCNC: 2.5 MG/DL
DIFFERENTIAL METHOD: ABNORMAL
EOSINOPHIL # BLD AUTO: 0 K/UL
EOSINOPHIL NFR BLD: 0.4 %
ERYTHROCYTE [DISTWIDTH] IN BLOOD BY AUTOMATED COUNT: 15 %
EST. GFR  (AFRICAN AMERICAN): 36.8 ML/MIN/1.73 M^2
EST. GFR  (NON AFRICAN AMERICAN): 31.8 ML/MIN/1.73 M^2
FACT X PPP CHRO-ACNC: 0.52 IU/ML
GLUCOSE SERPL-MCNC: 110 MG/DL
HCT VFR BLD AUTO: 28.6 %
HGB BLD-MCNC: 10 G/DL
INR PPP: 1.6
LYMPHOCYTES # BLD AUTO: 1.6 K/UL
LYMPHOCYTES NFR BLD: 17.4 %
MAGNESIUM SERPL-MCNC: 2 MG/DL
MCH RBC QN AUTO: 29.9 PG
MCHC RBC AUTO-ENTMCNC: 35 %
MCV RBC AUTO: 85 FL
MONOCYTES # BLD AUTO: 1.2 K/UL
MONOCYTES NFR BLD: 12.8 %
NEUTROPHILS # BLD AUTO: 6.2 K/UL
NEUTROPHILS NFR BLD: 67.9 %
OSMOLALITY SERPL: 287 MOSM/KG
PHOSPHATE SERPL-MCNC: 4.4 MG/DL
PLATELET # BLD AUTO: 305 K/UL
PMV BLD AUTO: 10.4 FL
POTASSIUM SERPL-SCNC: 4.2 MMOL/L
PROT SERPL-MCNC: 7.1 G/DL
PROTHROMBIN TIME: 16.2 SEC
RBC # BLD AUTO: 3.35 M/UL
SODIUM SERPL-SCNC: 126 MMOL/L
WBC # BLD AUTO: 9.19 K/UL

## 2017-03-22 PROCEDURE — 97168 OT RE-EVAL EST PLAN CARE: CPT

## 2017-03-22 PROCEDURE — 20600001 HC STEP DOWN PRIVATE ROOM

## 2017-03-22 PROCEDURE — 25000003 PHARM REV CODE 250: Performed by: INTERNAL MEDICINE

## 2017-03-22 PROCEDURE — 97803 MED NUTRITION INDIV SUBSEQ: CPT

## 2017-03-22 PROCEDURE — 85025 COMPLETE CBC W/AUTO DIFF WBC: CPT

## 2017-03-22 PROCEDURE — 97530 THERAPEUTIC ACTIVITIES: CPT

## 2017-03-22 PROCEDURE — 97116 GAIT TRAINING THERAPY: CPT

## 2017-03-22 PROCEDURE — 25000003 PHARM REV CODE 250: Performed by: PHYSICIAN ASSISTANT

## 2017-03-22 PROCEDURE — 83930 ASSAY OF BLOOD OSMOLALITY: CPT

## 2017-03-22 PROCEDURE — 99232 SBSQ HOSP IP/OBS MODERATE 35: CPT | Mod: ,,, | Performed by: INTERNAL MEDICINE

## 2017-03-22 PROCEDURE — 85520 HEPARIN ASSAY: CPT

## 2017-03-22 PROCEDURE — 84100 ASSAY OF PHOSPHORUS: CPT

## 2017-03-22 PROCEDURE — 83735 ASSAY OF MAGNESIUM: CPT

## 2017-03-22 PROCEDURE — 80053 COMPREHEN METABOLIC PANEL: CPT

## 2017-03-22 PROCEDURE — 85610 PROTHROMBIN TIME: CPT

## 2017-03-22 PROCEDURE — 36415 COLL VENOUS BLD VENIPUNCTURE: CPT

## 2017-03-22 RX ORDER — WARFARIN 10 MG/1
10 TABLET ORAL DAILY
Status: DISCONTINUED | OUTPATIENT
Start: 2017-03-22 | End: 2017-03-23

## 2017-03-22 RX ADMIN — MIRTAZAPINE 7.5 MG: 7.5 TABLET ORAL at 09:03

## 2017-03-22 RX ADMIN — LEVOCARNITINE 3.3 ML: 1 SOLUTION ORAL at 08:03

## 2017-03-22 RX ADMIN — HEPARIN SODIUM AND DEXTROSE 19 UNITS/KG/HR: 10000; 5 INJECTION INTRAVENOUS at 06:03

## 2017-03-22 RX ADMIN — ISOSORBIDE DINITRATE 20 MG: 10 TABLET ORAL at 11:03

## 2017-03-22 RX ADMIN — Medication 400 MG: at 08:03

## 2017-03-22 RX ADMIN — HYDRALAZINE HYDROCHLORIDE 50 MG: 50 TABLET ORAL at 08:03

## 2017-03-22 RX ADMIN — LEVOCARNITINE 3.3 ML: 1 SOLUTION ORAL at 09:03

## 2017-03-22 RX ADMIN — LEVETIRACETAM 750 MG: 750 TABLET, FILM COATED ORAL at 09:03

## 2017-03-22 RX ADMIN — GABAPENTIN 300 MG: 300 CAPSULE ORAL at 09:03

## 2017-03-22 RX ADMIN — DIGOXIN 0.12 MG: 125 TABLET ORAL at 04:03

## 2017-03-22 RX ADMIN — ISOSORBIDE DINITRATE 20 MG: 10 TABLET ORAL at 08:03

## 2017-03-22 RX ADMIN — HYDRALAZINE HYDROCHLORIDE 50 MG: 50 TABLET ORAL at 11:03

## 2017-03-22 RX ADMIN — HYDRALAZINE HYDROCHLORIDE 50 MG: 50 TABLET ORAL at 04:03

## 2017-03-22 RX ADMIN — HYDRALAZINE HYDROCHLORIDE 50 MG: 50 TABLET ORAL at 09:03

## 2017-03-22 RX ADMIN — Medication 3 ML: at 02:03

## 2017-03-22 RX ADMIN — WARFARIN SODIUM 10 MG: 10 TABLET ORAL at 04:03

## 2017-03-22 RX ADMIN — LEVETIRACETAM 750 MG: 750 TABLET, FILM COATED ORAL at 08:03

## 2017-03-22 RX ADMIN — ISOSORBIDE DINITRATE 20 MG: 10 TABLET ORAL at 09:03

## 2017-03-22 RX ADMIN — ISOSORBIDE DINITRATE 20 MG: 10 TABLET ORAL at 04:03

## 2017-03-22 NOTE — PROGRESS NOTES
[facility]  Adult Nutrition  Progress Note    SUMMARY     Recommendations    Recommendation/Intervention:   1. Continue current diet order with oral supplement.   2. Encouraged good PO intake of meals.   3. May consider appetite stimulant if intake does not improve. Will monitor.   Goals: PO intake >50%.   Nutrition Goal Status: progressing towards goal  Communication of RD Recs: reviewed with RN    Reason for Assessment    Reason for Assessment: RD follow-up  Diagnosis: cardiac disease  Relevent Medical History: cardiogenic shock   Nutrition Discharge Planning: Adequate PO intake for optimal nutrition.     Nutrition Prescription Ordered    Current Diet Order: Cardiac, FR 1500 ml  Oral Nutrition Supplement: Boost Breeze TID, Beneprotein TID     Nutrition Risk Screen     Nutrition Risk Screen: no indicators present    Nutrition/Diet History    Patient Reported Diet/Restrictions/Preferences: general  Typical Food/Fluid Intake: Pt consuming 25-50% of meals. States he consumed sausage and eggs this AM. Ordered Boost Breeze this morning, pt excited to try this with lunch.   Factors Affecting Nutritional Intake: decreased appetite    Labs/Tests/Procedures/Meds    Pertinent Labs Reviewed: reviewed, pertinent  Pertinent Labs Comments: Na 126, Alb 3  Pertinent Medications Reviewed: reviewed, pertinent  Pertinent Medications Comments: coenzyme Q10, coumadin    Physical Findings    Overall Physical Appearance: generalized wasting, loss of muscle mass, loss of subcutaneous fat, underweight  Oral/Mouth Cavity: WDL  Skin: intact    Anthropometrics    Height (inches): 72.01 in  Weight Method: Bed Scale  Weight (kg): 47.6 kg  Ideal Body Weight (IBW), Male: 178.06 lb  % Ideal Body Weight, Male (lb): 56.95   BMI (kg/m2): 13.75  BMI Grade: less than 16 protein-energy malnutrition grade III    Estimated/Assessed Needs    Weight Used For Calorie Calculations: 46 kg (101 lb 6.6 oz)   Height (cm): 182.9 cm  Energy Need  Method: Kcal/kg (5882-3376 kcal/day (35-40 kcal/kg))  RMR (Mercer-St. Jeor Equation): 1430.54  Weight Used For Protein Calculations: 46 kg (101 lb 6.6 oz)  Protein Requirements: 70 g/day  Fluid Need Method: RDA Method (or per MD)     Monitor and Evaluation    Food and Nutrient Intake: energy intake, food and beverage intake  Food and Nutrient Adminstration: diet order  Anthropometric Measurements: weight, weight change  Biochemical Data, Medical Tests and Procedures: other (specify) (All labs)  Nutrition-Focused Physical Findings: overall appearance    Nutrition Risk    Level of Risk: moderate    Nutrition Follow-Up    RD Follow-up?: Yes    Assessment and Plan    Inadequate energy intake r/t inability to consume adequate intake AEB pt with fair appetite consuming 25-50% of meals, BMI of 13.75 - continues

## 2017-03-22 NOTE — PLAN OF CARE
Problem: Patient Care Overview  Goal: Plan of Care Review  Outcome: Ongoing (interventions implemented as appropriate)  Pt remained stable throughout the night. No acute distress noted. Pt remained free from injury. VSS. Pt denies any chest pain or SOB.  2.5mcg, going home on . Heparin gtt INR 1.7 needs to be between 2.5-3.5. D.c once therapeutic. Pt understands plan of care. Will continue to monitor.

## 2017-03-22 NOTE — PLAN OF CARE
Problem: Physical Therapy Goal  Goal: Physical Therapy Goal  Goals to be met by: 4/3/17 - Set at re-eval.    Patient will increase functional independence with mobility by performin. Supine to sit with Villalba. - met 3/22  2. Sit to stand transfer with Villalba.  3. Bed to chair transfer with Supervision (with/without use of rolling walker).  4. Gait x 200 feet with Supervision and use of rolling walker.   5. Lower extremity exercise program x15 reps, with assistance as needed, in order to increase LE strength and (I) with functional mobility.    Outcome: Ongoing (interventions implemented as appropriate)  Goals reviewed and remain appropriate. Pt progressing towards goals.     Mary Morton, PT, DPT   3/22/2017  [phone]

## 2017-03-22 NOTE — PROGRESS NOTES
Heart Failure Progress Note  Attending Physician: Landon Galeano MD  Hospital Day: 20    Subjective:   Interval History: No events reported overnight. Remains on  @ 2.5 mcg/kg/min. Otherwise no complaints, feeling better.    Medications:   Continuous Infusions:   DOBUTamine 2.5 mcg/kg/min (03/20/17 1717)    heparin (porcine) in D5W 19 Units/kg/hr (03/21/17 2255)       Scheduled Meds:   coenzyme Q10  400 mg Oral Daily    digoxin  0.125 mg Oral Every Mon, Wed, Fri    gabapentin  300 mg Oral QHS    hydrALAZINE  50 mg Oral QID (WM & HS)    isosorbide dinitrate  20 mg Oral QID (WM & HS)    levetiracetam  750 mg Oral BID    levocarnitine  330 mg Oral BID    mirtazapine  7.5 mg Oral QHS    senna-docusate 8.6-50 mg  2 tablet Oral BID    sodium chloride 0.9%  3 mL Intravenous Q8H    warfarin  10 mg Oral Daily     PRN Meds:alprazolam  Objective:     Vitals:  Temp:  [98.2 °F (36.8 °C)-100 °F (37.8 °C)]   Pulse:  []   Resp:  [16-20]   BP: ()/(52-67)   SpO2:  [94 %-98 %]  I/O's:    Intake/Output Summary (Last 24 hours) at 03/22/17 1206  Last data filed at 03/22/17 0600   Gross per 24 hour   Intake             1031 ml   Output             1725 ml   Net             -694 ml        Constitutional: NAD, conversant  HEENT: Sclera anicteric, PERRLA, EOMI  Neck: No JVD, no carotid bruits  CV: RRR, no murmur, normal S1/S2  Pulm: CTAB, no wheezes, rales, or ronchi  GI: Abdomen soft, NTND, +BS  Extremities: No LE edema, warm and well perfused  Skin: No ecchymosis, erythema, or ulcers  Psych: AOx3, appropriate affect  Neuro: CNII-XII intact, no focal deficits    Labs:       Recent Labs  Lab 03/20/17  0345 03/21/17  0527 03/22/17  0514   * 127* 126*   K 4.6 4.3 4.2   CL 94* 91* 92*   CO2 18* 19* 20*   BUN 66* 67* 65*   CREATININE 2.4* 2.4* 2.5*    120* 110   ANIONGAP 16 17* 14       Recent Labs  Lab 03/20/17  0345 03/21/17  0527 03/22/17  0514   * 150* 100*   ALT 37 34 32   ALKPHOS 181*  190* 186*   BILITOT 0.8 1.1* 1.0   ALBUMIN 2.8* 3.0* 3.0*        Recent Labs  Lab 03/20/17  0345 03/21/17  0527 03/22/17  0515   WBC 9.82 8.77 9.19   HGB 10.4* 10.4* 10.0*   HCT 30.2* 31.2* 28.6*    302 305   GRAN 70.7  6.9 69.6  6.0 67.9  6.2       Recent Labs  Lab 03/20/17  0345 03/21/17  0527 03/22/17  0515   INR 1.9* 1.7* 1.6*     No results for input(s): TROPONINI, BNP in the last 168 hours.   Micro:   Blood Cultures  Lab Results   Component Value Date    LABBLOO No growth after 5 days. 03/10/2017    LABBLOO No growth after 5 days. 03/10/2017     Urine Cultures  Lab Results   Component Value Date    LABURIN No growth 03/10/2017       Imaging:   TTE (3/15/2017)  TEST DESCRIPTION   Technical Quality: This is a technically adequate study.     Aorta: The aortic root is normal in size, measuring 2.1 cm at sinotubular junction and 3.1 cm at Sinuses of Valsalva. The proximal ascending aorta is normal in size, measuring 2.5 cm across.     Left Atrium: The left atrial volume index is severely enlarged, measuring 64.37 cc/m2.     Left Ventricle: The left ventricle is normal in size, with an end-diastolic diameter of 6.7 cm, and an end-systolic diameter of 5.9 cm. LV wall thickness is normal, with the septum and the posterior wall each measuring 1.0 cm across. Relative wall thickness was normal at 0.30, and the LV mass index was increased at 219.9 g/m2 consistent with eccentric left ventricular hypertrophy. Global left ventricular systolic function appears severely depressed. Visually estimated ejection fraction is <10%. The LV Doppler derived stroke volume equals 28.0 ccs; at a measured heart rate of 103 bpm this results in a LV Doppler derived cardiac output of 2.9 L/min (CI = 1.78 L/min/m2). There is global hypokinesis.     Right Atrium: The right atrium is enlarged, measuring 6.7 cm in length and 5.3 cm in width in the apical view.     Right Ventricle: The right ventricle is enlarged measuring 5.2 cm at the  base in the apical right ventricle-focused view. Global right ventricular systolic function appears low normal to mildly depressed. Tricuspid annular plane systolic excursion (TAPSE) is 2.2 cm. Tissue Doppler-derived tricuspid annular peak systolic velocity (S prime) is 12.4 cm/s.     Aortic Valve:  Aortic valve is normal in structure with normal leaflet mobility.     Mitral Valve:  Mitral valve is normal in structure with normal leaflet mobility.     Tricuspid Valve:  Tricuspid valve is normal in structure with normal leaflet mobility.     Pulmonary Valve:  Pulmonary valve is normal in structure with normal leaflet mobility.     IVC: IVC is enlarged and collapses < 50% with a sniff, suggesting high right atrial pressure of 15 mmHg.     Shunt: This study was performed in conjunction with intravenous bubble contrast. There is evidence of a right to left shunt across the atrial septum.     Intracavitary: There is no evidence of pericardial effusion, intracavity mass, thrombi, or vegetation.     Other: If clinically indicated, a full Doppler study can be performed.     RV/LV ratio is 0.78 (RV/LV diameter ratio > 0.75 is associated with higher risk of RV failure in patients with continuous-flow LVADs).     CONCLUSIONS     1 - Eccentric hypertrophy.     2 - Severely depressed left ventricular systolic function (EF <10%).     3 - Biatrial enlargement.     4 - Right ventricular enlargement with low normal to mildly depressed systolic function.     5 - Increased central venous pressure.    EF   Date Value Ref Range Status   03/14/2017 9 (A) 55 - 65    03/03/2017 10 (A) 55 - 65    03/03/2017 10 (A) 55 - 65      Assessment:   35 y/o M with PMHx of HFrEF (EF <10%) 2/2 NICM s/p AICD (implanted 12/2013), MELAS (mitochondrial encephalopathy, lactic acidosis, and stroke-like episodes), Hx of LV thrombus, Atrial Flutter, CKD, seizure disorder, who was transferred to [facility] from [facility] in cardiogenic shock and consideration for heart  transplant and/or advanced options. IABP now out, decreasing support, still on .    Patient has been denied advanced options when presented. Pathway was closed in step 1 due to inability to care for himself and lack of insight.    Plan:   HFrEF (NYHA Functional Class IV, ACC/AHA Stage D) 2/2 NICM, presented in Cardiogenic shock, improving, off IABP, still requiring inotrope; Net -0.25L last 24H, -21.5 L since admission  - Continue  @ 2.5 mcg/kg/min  - Stopped Bumex 2 mg Daily this morning with Cr at 2.5, clinically dry on exam  - Continue hydralazine 50 mg and ISDN at 20 mg Q6H, now able to tolerate all doses  - Continue to hold Aldactone 25 mg Daily with elevated Cr at 2.5 and K 4.2  - Concern for Na 126 (down from 137-140 on admission), serum osm measured was 287, calculated was 281 so normal osmolal gap; holding Bumex should help  - Continue digoxin 125 mcg MWF  - Not a candidate for advanced options, pathway stopped at Step 1     Leukocytosis, concern for sepsis, now resolved  - US with sludge; CXR with no pneumonia  - Blood and Urine Cx NGTD (sent 3/10/2017)  - Completed 7 day course of Vancomycin and Cefepime on 3/16/2017  - ID on consult, appreciate rec's     Cerebral Air Embolism  - CT on 3/9/2017 with venous air embolism within the cavernous sinuses and smaller component within the frontal sulci  - EEG normal, unable to get MRI 2/2 ICD  - TTE with bubble shows R to left shunt across the atrial septum  - Stroke team on consult, appreciate rec's     Atrial Flutter, s/p RENÉE/DCCV, currently in sinus rhythm  - INR 1.6 today, down from 1.7 yesterday  - Continue heparin gtt to bridge, Will increase Coumadin to 10 mg tonight (from 7.5 mg) and re-assess tomorrow  - Follow up with Dr. Reyez 4 weeks after discharge    SOILA  - Continue home medications Keppra, L-carnitine and CoQ  - Vascular neurology on consult, appreciate rec's     Diet: Cardiac diet with boost  PPx: On heparin gtt while bridging to  Coumadin  PT/OT    Code Status: FULL CODE  Disposition: Possible discharge by end of the week once INR therapeutic, will go home with home health and home . Follow up with Dr. Maico Encarnacion at [facility].    Patient seen and examined this morning. Discussed with Dr. Manzo, staff attestation to follow.    Signed:  Mekhi Caro MD  Cardiology Fellow - PGY4  Pager: [phone]  3/22/2017 12:24 PM

## 2017-03-22 NOTE — PLAN OF CARE
Problem: Occupational Therapy Goal  Goal: Occupational Therapy Goal  Updated Goals to be met by: 3/22/17     Patient will increase functional independence with ADLs by performing:    UE Dressing with Supervision.  LE Dressing with Supervision.  Grooming while standing at sink with Supervision.  Toileting from toilet with Supervision for hygiene and clothing management.   Toilet transfer to toilet with Supervision.  Upper extremity exercise program x10 reps per handout, with independence.    Goals to be met by: 3/16/17     Patient will increase functional independence with ADLs by performing:    UE Dressing with Supervision.  LE Dressing with Supervision.  Grooming while standing at sink with Supervision.  Toileting from toilet with Supervision for hygiene and clothing management.   Toilet transfer to toilet with Supervision.  Upper extremity exercise program x10 reps per handout, with independence.   Outcome: Outcome(s) achieved Date Met:  03/22/17  Re-eval and D/C OT 3/22

## 2017-03-22 NOTE — PT/OT/SLP PROGRESS
Physical Therapy  Treatment    Binh Conde   MRN: [id number]   Admitting Diagnosis: Cardiogenic shock    PT Received On: 17  PT Start Time: 956     PT Stop Time:     PT Total Time (min): 15 min       Billable Minutes:  Gait [id number] (co-tx with OT)     Treatment Type: Treatment  PT/PTA: PT     PTA Visit Number: 0       General Precautions: Standard, fall, seizure  Orthopedic Precautions: N/A   Braces: N/A    Do you have any cultural, spiritual, Uatsdin conflicts, given your current situation?: Protestant    Subjective:  Communicated with RN prior to session.  Pt agreeable to therapy.     Pain Ratin/10    Objective:   Patient found with: peripheral IV, telemetry    Functional Mobility:  Bed Mobility:   Scooting/Bridging: Independent  Supine to Sit: Independent  Sit to Supine: Independent    Transfers:  Sit <> Stand Assistance: Stand By Assistance  Sit <> Stand Assistive Device: Rolling Walker    Min v/c on technique and hand placement     Gait:   Gait Distance: ~200 ft.   Assistance 1: Contact Guard Assistance  Gait Assistive Device: Rolling walker  Gait Pattern: swing-through gait  Gait Deviation(s): decreased benedict, decreased step length, foot flat, decreased velocity of limb motion, increased time in double stance   Chair follow throughout   Max v/c for forward gaze throughout    Increased difficulty in open environment and when making turns, requiring mod v/c and t/c for technique, safety, and attention to task    Balance:   Static Sit: GOOD+: Takes MAXIMAL challenges from all directions.    Dynamic Sit: GOOD+: Maintains balance through MAXIMAL excursions of active trunk motion  Static Stand: GOOD: Takes MODERATE challenges from all directions  Dynamic stand: FAIR+: Needs CLOSE SUPERVISION during gait and is able to right self with minor LOB     Therapeutic Activities and Exercises:  Pt oriented to call bell and instructed to have staff member assist for all OOB activity.    Required  increased time with functional mobility this session, as pt easily distracted and required cues to attend to task.     AM-PAC 6 CLICK MOBILITY  How much help from another person does this patient currently need?   1 = Unable, Total/Dependent Assistance  2 = A lot, Maximum/Moderate Assistance  3 = A little, Minimum/Contact Guard/Supervision  4 = None, Modified [place]/Independent    Turning over in bed (including adjusting bedclothes, sheets and blankets)?: 4  Sitting down on and standing up from a chair with arms (e.g., wheelchair, bedside commode, etc.): 3  Moving from lying on back to sitting on the side of the bed?: 4  Moving to and from a bed to a chair (including a wheelchair)?: 3  Need to walk in hospital room?: 3  Climbing 3-5 steps with a railing?: 2  Total Score: 19    AM-PAC Raw Score CMS G-Code Modifier Level of Impairment Assistance   6 % Total / Unable   7 - 9 CM 80 - 100% Maximal Assist   10 - 14 CL 60 - 80% Moderate Assist   15 - 19 CK 40 - 60% Moderate Assist   20 - 22 CJ 20 - 40% Minimal Assist   23 CI 1-20% SBA / CGA   24 CH 0% Independent/ Mod I     Patient left supine with all lines intact, call button in reach, bed alarm on and RN notified.    Assessment:  Binh Conde is a 36 y.o. male with a medical diagnosis of Cardiogenic shock and presents with seizure disorder, limiting further progression of mobility. Pt has had seizure-like episodes during ambulation with therapy x2 during previous sessions this week, so limited ambulation performed this date. Pt alert and responsive throughout session with no signs of seizure noted. Pt is able to perform all mobility without physical assist, but requires increased time and cueing 2* limited attention. Recommend 2-3 more sessions to progress (I) and safety with mobility prior to d/c. Pt remains appropriate for skilled IP PT in order to address current deficits and progress functional mobility.     Rehab identified problem  list/impairments: Rehab identified problem list/impairments: weakness, impaired self care skills, impaired balance, impaired endurance, impaired functional mobilty, gait instability, decreased coordination, decreased safety awareness, impaired cognition, impaired cardiopulmonary response to activity, impaired coordination    Rehab potential is good.    Activity tolerance: Good    Discharge recommendations: Discharge Facility/Level Of Care Needs: home health PT, home health OT (with 24-hour supervision )     Barriers to discharge: Barriers to Discharge: Decreased caregiver support    Equipment recommendations: Equipment Needed After Discharge: bath bench, walker, rolling     GOALS:   Physical Therapy Goals        Problem: Physical Therapy Goal    Goal Priority Disciplines Outcome Goal Variances Interventions   Physical Therapy Goal     PT/OT, PT Ongoing (interventions implemented as appropriate)     Description:  Goals to be met by: 4/3/17 - Set at re-eval.    Patient will increase functional independence with mobility by performin. Supine to sit with Stephenson. - met 3/22  2. Sit to stand transfer with Stephenson.  3. Bed to chair transfer with Supervision (with/without use of rolling walker).  4. Gait  x 200 feet with Supervision and use of rolling walker.   5. Lower extremity exercise program x15 reps, with assistance as needed, in order to increase LE strength and (I) with functional mobility.                    PLAN:    Patient to be seen 2 x/week  to address the above listed problems via gait training, therapeutic activities, therapeutic exercises, neuromuscular re-education  Plan of Care expires: 17  Plan of Care reviewed with: patient        Mary Praveen, PT, DPT   3/22/2017  [phone]

## 2017-03-22 NOTE — PROGRESS NOTES
UPDATE    SW to pt's room for update. Pt presents as aaox3 with calm affect. Pt reports coping adequately with no needs at this time. SW providing psychosocial and counseling support, education, resources, and d/c planning as needed. SW continuing to follow and remains available.

## 2017-03-22 NOTE — PT/OT/SLP RE-EVAL
Occupational Therapy  Re-evaluation/Discharge    Binh Conde   MRN: [id number]   Admitting Diagnosis: Cardiogenic shock    OT Date of Treatment: 03/22/17   OT Start Time: 0956  OT Stop Time: 1017  OT Total Time (min): 21 min    Billable Minutes:  Re-eval 11  Therapeutic Activity 10    Diagnosis: Cardiogenic shock   S/p Emmanuel with transfer to ICU    Past Medical History:   Diagnosis Date    Acute on chronic systolic congestive heart failure 3/16/2017    Dr. Encarnacion spoke with Dr. Encarnacion and his sister Shiloh by phone 3/16/17 and explained concerns regarding advanced options (Pathway stopped 3/3/17 and discussed by [facility] physicians as group 3/15/17) Discussed his prognosis with death rate estimated at 50% at 1 year and 80% at 3 years Dr. Encarnacion will follow as outpatient and agrees with [facility] for palliation and Cholo Sister plans for patient to move in wi    CHF (congestive heart failure)     Hypertension     Seizures       History reviewed. No pertinent surgical history.    Referring physician: Dr. Caro  Date referred to OT: 3/20/17    General Precautions: Standard, fall, seizure  Orthopedic Precautions: N/A  Braces: N/A    Do you have any cultural, spiritual, Mosque conflicts, given your current situation?: none     Patient History:  Living Environment  Lives With: alone  Living Arrangements: apartment (first floor/no concerns)  Transportation Available: family or friend will provide  Living Environment Comment: Pt was living alone in apt PTA but plans to live with sister upon D/C in 1-story house with 0 SUSY. Pt does not own any DME and reports between his 2 sisters he will have assist at home.  Equipment Currently Used at Home: none    Prior level of function:   Bed Mobility/Transfers: independent  Grooming: independent  Bathing: independent  Upper Body Dressing: independent  Lower Body Dressing: independent  Toileting: independent  Driving License: No  Occupation: Unemployed     Subjective:  Communicated  "with RN prior to session.  "I don't want to go as far today."    Chief Complaint: None stated  Patient/Family stated goals: Go home Friday    Pain Ratin/10  Pain Rating Post-Intervention: 0/10    Objective:  Patient found with: telemetry, peripheral IV    Cognitive Exam:  Oriented to: Person, Place and Time  Follows Commands/attention: Easily distracted and Follows one-step commands  Communication: clear/fluent  Memory: No Deficits noted  Safety awareness/insight to disability: impaired     Physical Exam:  Postural examination/scapula alignment: No postural abnormalities identified  Skin integrity: Visible skin intact  Edema: None noted      Sensation:   Intact     Upper Extremity Range of Motion:  Right Upper Extremity: WFL  Left Upper Extremity: WFL     Upper Extremity Strength:  Right Upper Extremity: 3+/5 throughout  Left Upper Extremity: 3+/5 throughout   Strength: WFL     Fine motor coordination:   Intact    Functional Mobility:  Bed Mobility:  Supine to Sit: Independent  Sit to Supine: Independent    Transfers:  Sit <> Stand Assistance: Stand By Assistance from EOB; cues for hand placement  Sit <> Stand Assistive Device: Rolling Walker    Functional Ambulation: Within room and hallway with CGA using RW    Activities of Daily Living:  Feeding Level of Assistance: Set-up Assistance  LE Dressing Level of Assistance: Supervision to don socks    Balance:   Static Sit: GOOD+: Takes MAXIMAL challenges from all directions.    Dynamic Sit: GOOD: Maintains balance through MODERATE excursions of active trunk movement  Static Stand: FAIR+: Takes MINIMAL challenges from all directions  Dynamic stand: FAIR: Needs CONTACT GUARD during gait    Therapeutic Activities and Exercises:  OT re-eval; white board updated    AM-PAC 6 CLICK ADL  How much help from another person does this patient currently need?  1 = Unable, Total/Dependent Assistance  2 = A lot, Maximum/Moderate Assistance  3 = A little, Minimum/Contact " "Guard/Supervision  4 = None, Modified [place]/Independent    Putting on and taking off regular lower body clothing? : 3  Bathing (including washing, rinsing, drying)?: 3  Toileting, which includes using toilet, bedpan, or urinal? : 3  Putting on and taking off regular upper body clothing?: 3  Taking care of personal grooming such as brushing teeth?: 4  Eating meals?: 4  Total Score: 20    AM-PAC Raw Score CMS "G-Code Modifier Level of Impairment Assistance   6 % Total / Unable   7 - 9 CM 80 - 100% Maximal Assist   10 - 14 CL 60 - 80% Moderate Assist   15 - 19 CK 40 - 60% Moderate Assist   20 - 22 CJ 20 - 40% Minimal Assist   23 CI 1-20% SBA / CGA   24 CH 0% Independent/ Mod I       Patient left supine with all lines intact and call button in reach    Assessment:  Binh Conde is a 36 y.o. male with a medical diagnosis of Cardiogenic shock and presents with decreased safety awareness and endurance. At baseline, pt has seizure disorder and will require assist for safety in the home. Pt does not have acute OT goals at this time.    Activity tolerance: Good    Discharge recommendations: Discharge Facility/Level Of Care Needs: home with home health and 24 hr assistance/supervision     Barriers to discharge: Barriers to Discharge: Decreased caregiver support    Equipment recommendations: bath bench, walker, rolling     GOALS:   Occupational Therapy Goals     Not on file      Multidisciplinary Problems (Resolved)        Problem: Occupational Therapy Goal    Goal Priority Disciplines Outcome Interventions   Occupational Therapy Goal   (Resolved)     OT, PT/OT Outcome(s) achieved    Description:  Updated Goals to be met by: 3/22/17     Patient will increase functional independence with ADLs by performing:    UE Dressing with Supervision.  LE Dressing with Supervision.  Grooming while standing at sink with Supervision.  Toileting from toilet with Supervision for hygiene and clothing management.   Toilet " transfer to toilet with Supervision.  Upper extremity exercise program x10 reps per handout, with independence.    Goals to be met by: 3/16/17     Patient will increase functional independence with ADLs by performing:    UE Dressing with Supervision.  LE Dressing with Supervision.  Grooming while standing at sink with Supervision.  Toileting from toilet with Supervision for hygiene and clothing management.   Toilet transfer to toilet with Supervision.  Upper extremity exercise program x10 reps per handout, with independence.                 PLAN: D/C OT   Plan of Care expires: 04/15/17  Plan of Care reviewed with: patient    Nathalie JOHN Norris  03/22/2017

## 2017-03-23 LAB
ALBUMIN SERPL BCP-MCNC: 3.1 G/DL
ALP SERPL-CCNC: 198 U/L
ALT SERPL W/O P-5'-P-CCNC: 33 U/L
ANION GAP SERPL CALC-SCNC: 18 MMOL/L
AST SERPL-CCNC: 77 U/L
BASOPHILS # BLD AUTO: 0.05 K/UL
BASOPHILS NFR BLD: 0.6 %
BILIRUB SERPL-MCNC: 1.2 MG/DL
BUN SERPL-MCNC: 60 MG/DL
CALCIUM SERPL-MCNC: 9.7 MG/DL
CHLORIDE SERPL-SCNC: 91 MMOL/L
CO2 SERPL-SCNC: 18 MMOL/L
CREAT SERPL-MCNC: 2.2 MG/DL
DIFFERENTIAL METHOD: ABNORMAL
EOSINOPHIL # BLD AUTO: 0.1 K/UL
EOSINOPHIL NFR BLD: 0.7 %
ERYTHROCYTE [DISTWIDTH] IN BLOOD BY AUTOMATED COUNT: 15 %
EST. GFR  (AFRICAN AMERICAN): 43 ML/MIN/1.73 M^2
EST. GFR  (NON AFRICAN AMERICAN): 37.2 ML/MIN/1.73 M^2
FACT X PPP CHRO-ACNC: 0.51 IU/ML
GLUCOSE SERPL-MCNC: 121 MG/DL
HCT VFR BLD AUTO: 29.3 %
HGB BLD-MCNC: 10.2 G/DL
INR PPP: 1.7
LYMPHOCYTES # BLD AUTO: 1.6 K/UL
LYMPHOCYTES NFR BLD: 17.5 %
MAGNESIUM SERPL-MCNC: 2 MG/DL
MCH RBC QN AUTO: 29.7 PG
MCHC RBC AUTO-ENTMCNC: 34.8 %
MCV RBC AUTO: 85 FL
MONOCYTES # BLD AUTO: 1.1 K/UL
MONOCYTES NFR BLD: 12.4 %
NEUTROPHILS # BLD AUTO: 6.1 K/UL
NEUTROPHILS NFR BLD: 67.6 %
PHOSPHATE SERPL-MCNC: 4.2 MG/DL
PLATELET # BLD AUTO: 334 K/UL
PMV BLD AUTO: 10.4 FL
POTASSIUM SERPL-SCNC: 4.2 MMOL/L
PROT SERPL-MCNC: 7.3 G/DL
PROTHROMBIN TIME: 17.5 SEC
RBC # BLD AUTO: 3.43 M/UL
SODIUM SERPL-SCNC: 127 MMOL/L
WBC # BLD AUTO: 8.97 K/UL

## 2017-03-23 PROCEDURE — 85610 PROTHROMBIN TIME: CPT

## 2017-03-23 PROCEDURE — 84100 ASSAY OF PHOSPHORUS: CPT

## 2017-03-23 PROCEDURE — 25000003 PHARM REV CODE 250: Performed by: INTERNAL MEDICINE

## 2017-03-23 PROCEDURE — 80053 COMPREHEN METABOLIC PANEL: CPT

## 2017-03-23 PROCEDURE — 99232 SBSQ HOSP IP/OBS MODERATE 35: CPT | Mod: ,,, | Performed by: INTERNAL MEDICINE

## 2017-03-23 PROCEDURE — 25000003 PHARM REV CODE 250: Performed by: PHYSICIAN ASSISTANT

## 2017-03-23 PROCEDURE — 36415 COLL VENOUS BLD VENIPUNCTURE: CPT

## 2017-03-23 PROCEDURE — 85520 HEPARIN ASSAY: CPT

## 2017-03-23 PROCEDURE — 85025 COMPLETE CBC W/AUTO DIFF WBC: CPT

## 2017-03-23 PROCEDURE — 83735 ASSAY OF MAGNESIUM: CPT

## 2017-03-23 PROCEDURE — 20600001 HC STEP DOWN PRIVATE ROOM

## 2017-03-23 RX ORDER — WARFARIN 7.5 MG/1
7.5 TABLET ORAL DAILY
Status: DISCONTINUED | OUTPATIENT
Start: 2017-03-24 | End: 2017-03-24 | Stop reason: HOSPADM

## 2017-03-23 RX ADMIN — MIRTAZAPINE 7.5 MG: 7.5 TABLET ORAL at 10:03

## 2017-03-23 RX ADMIN — LEVETIRACETAM 750 MG: 750 TABLET, FILM COATED ORAL at 10:03

## 2017-03-23 RX ADMIN — HYDRALAZINE HYDROCHLORIDE 50 MG: 50 TABLET ORAL at 12:03

## 2017-03-23 RX ADMIN — HEPARIN SODIUM AND DEXTROSE 19 UNITS/KG/HR: 10000; 5 INJECTION INTRAVENOUS at 01:03

## 2017-03-23 RX ADMIN — ISOSORBIDE DINITRATE 20 MG: 10 TABLET ORAL at 05:03

## 2017-03-23 RX ADMIN — HYDRALAZINE HYDROCHLORIDE 50 MG: 50 TABLET ORAL at 05:03

## 2017-03-23 RX ADMIN — LEVOCARNITINE 3.3 ML: 1 SOLUTION ORAL at 09:03

## 2017-03-23 RX ADMIN — STANDARDIZED SENNA CONCENTRATE AND DOCUSATE SODIUM 2 TABLET: 8.6; 5 TABLET, FILM COATED ORAL at 10:03

## 2017-03-23 RX ADMIN — WARFARIN SODIUM 10 MG: 10 TABLET ORAL at 05:03

## 2017-03-23 RX ADMIN — LEVETIRACETAM 750 MG: 750 TABLET, FILM COATED ORAL at 09:03

## 2017-03-23 RX ADMIN — ISOSORBIDE DINITRATE 20 MG: 10 TABLET ORAL at 12:03

## 2017-03-23 RX ADMIN — LEVOCARNITINE 3.3 ML: 1 SOLUTION ORAL at 10:03

## 2017-03-23 RX ADMIN — HYDRALAZINE HYDROCHLORIDE 50 MG: 50 TABLET ORAL at 10:03

## 2017-03-23 RX ADMIN — HYDRALAZINE HYDROCHLORIDE 50 MG: 50 TABLET ORAL at 09:03

## 2017-03-23 RX ADMIN — GABAPENTIN 300 MG: 300 CAPSULE ORAL at 10:03

## 2017-03-23 RX ADMIN — ISOSORBIDE DINITRATE 20 MG: 10 TABLET ORAL at 10:03

## 2017-03-23 RX ADMIN — Medication 3 ML: at 02:03

## 2017-03-23 RX ADMIN — Medication 400 MG: at 09:03

## 2017-03-23 RX ADMIN — ISOSORBIDE DINITRATE 20 MG: 10 TABLET ORAL at 09:03

## 2017-03-23 NOTE — PLAN OF CARE
Problem: Patient Care Overview  Goal: Plan of Care Review  Outcome: Ongoing (interventions implemented as appropriate)  POC reviewed with pt. VS stable. Pt remains free from falls, trauma, injury; pt tolerating POC well. Pt's Bumex d/c due to creatinine levels. Will continue to monitor.

## 2017-03-23 NOTE — PHYSICIAN QUERY
PT Name: Binh Conde  MR #: [id number]    Physician Query Form - Nutrition Clarification     CDS/: Curtis WHITE Elton Baker               Contact information: [phone]    This form is a permanent document in the medical record.     Query Date: March 23, 2017    By submitting this query, we are merely seeking further clarification of documentation.. Please utilize your independent clinical judgment when addressing the question(s) below.    The Medical record contains the following:   Indicators  Supporting Clinical Findings Location in Medical Record   x % of Estimated Energy Intake over a time frame from p.o., TF, or TPN Inadequate energy intake r/t inability to consume adequate intake AEB pt with fair appetite consuming 25-50% of meals, BMI of 13.75 - continues 3/22 Nutrition Consult    Weight Status over a time frame     x Subcutaneous Fat and/or Muscle Loss Physical Findings  Overall Physical Appearance: generalized wasting, loss of muscle mass, loss of subcutaneous fat, underweight 3/22 Nutrition Consult    Fluid Accumulation or Edema      Reduced  Strength     x Wt / BMI / Usual Body Weight Weight (kg): 47.6 kg  BMI (kg/m2): 13.75  Usual Body Weight: 3/22 Nutrition Consult  3/22 Nutrition Consult    Delayed Wound Healing / Failure to Thrive      Acute or Chronic Illness      Medication      Treatment Beneprotein Supplement All meals      Boost Breeze Supplement Wildberry All meals   3/4 Orders    3/22 Orders   x Other cachectic     Pt with varied PO intake, consuming approx 25-50% of meals.   Factors Affecting Nutritional Intake: decreased appetite     BMI Grade: less than 16 protein-energy malnutrition grade III   3/16 Surgery Consult    3/15 Nutrition Consult          3/22 Nutrition Consult             AND / ASPEN Clinical Characteristics (October 2011)  A minimum of two characteristics is recommended for diagnosing either moderate or severe malnutrition   Mild Malnutrition Moderate Malnutrition  Severe Malnutrition   Energy Intake from p.o., TF or TPN. < 75% intake of estimated energy needs for less than 7 days < 75% intake of estimated energy needs for greater than 7 days < 50% intake of estimated energy needs for > 5 days   Weight Loss 1-2% in 1 month  5% in 3 months  7.5% in 6 months  10% in 1 year 1-2 % in 1 week  5% in 1 month  7.5% in 3 months  10% in 6 months  20% in 1 year > 2% in 1 week  > 5% in 1 month  > 7.5% in 3 months  > 10% in 6 months  > 20% in 1 year   Physical Findings     None *Mild subcutaneous fat and/or muscle loss  *Mild fluid accumulation  *Stage II decubitus  *Surgical wound or non-healing wound *Mod/severe subcutaneous fat and/or muscle loss  *Mod/severe fluid accumulation  *Stage III or IV decubitus  *Non-healing surgical wound     Provider, please specify diagnosis or diagnoses associated with above clinical findings.    [ [id number]] Moderate Protein-Calorie Malnutrition    [ ] Severe Protein-Calorie Malnutrition    [ ] Cachexia    [ ] Other: ________________________________    [ ] Clinically Undetermined    Please document in your progress notes daily for the duration of treatment until resolved and include in your discharge summary.

## 2017-03-23 NOTE — PLAN OF CARE
[facility]   Heart Transplant Clinic  [address] [place] [address]   [phone] after hours        HOME  HEALTH ORDERS      Admit to Home Health    Diagnosis:   Patient Active Problem List   Diagnosis    MELAS (mitochondrial encephalopathy, lactic acidosis and stroke-like episodes)    Typical atrial flutter    NICM (nonischemic cardiomyopathy)    Seizure disorder    CKD (chronic kidney disease) stage 4, GFR 15-29 ml/min    Thrombocytopenia    Coagulopathy    Transaminitis    Hypoalbuminemia    Cognitive impairment    Cardiogenic shock    Acute on chronic systolic congestive heart failure    Acute on chronic renal insufficiency       Patient is homebound due to: weakness, impaired self care skills, impaired balance, impaired endurance, impaired functional mobilty, gait instability, decreased coordination, decreased safety awareness, impaired cognition, impaired cardiopulmonary response to activity, impaired coordination    Diet: Cardiac diet, 1500 ml fluid restriction    Acitivities: Ad johanny    Nursing:   SN to complete comprehensive assessment including routine vital signs. Instruct on disease process and s/s of complications to report to MD. Review/verify medication list sent home with the patient at time of discharge  and instruct patient/caregiver as needed. Frequency may be adjusted depending on start of care date.    Notify MD if SBP > 160 or < 90; DBP > 90 or < 50; HR > 120 or < 50; Temp > 101; Weight gain >3lbs in 1 day or 5lbs in 1 week.    LABS:  SN to perform labs: INR in 1 week, send results to Dr. Maico Encarnacion at [facility]    HOME INFUSION THERAPY:  SN to perform Infusion Therapy/Central Line Care.  Review Central Line Care & Central Line Flush with patient.    Administer (drug and dose): Dobutamine at 2.5 mcg/kg/min, dosing weight of 48 kg.    **For questions or concerns, please call [phone] and ask for Pre-Heart transplant clinic, M-F  8-5. After hours, weekends, call [phone] and ask for the Heart Transplant Cardiologist on call.**    Central line care:  Scrub the Hub: Prior to accessing the line, always perform a 30 second alcohol scrub  Each lumen of the central line is to be flushed at least daily with 10 mL Normal Saline and 3 mL Heparin flush (100 units/mL)  Skilled Nurse (SN) may draw blood from IV access  Blood Draw Procedure:   - Aspirate at least 5 mL of blood   - Discard   - Obtain specimen   - Change posiflow cap   - Flush with 20 mL Normal Saline followed by a                 3-5 mL Heparin flush (100 units/mL)  Central :   - Sterile dressing changes are done weekly and as needed.   - Use chlor-hexadine scrub to cleanse site, apply Biopatch to insertion site,       apply securement device dressing   - Posi-flow caps are changed weekly and after EVERY lab draw.   - If sterile gauze is under dressing to control oozing,                 dressing change must be performed every 24 hours until gauze is not needed.      CONSULTS:  Physical Therapy to evaluate and treat. Evaluate for home safety and equipment needs; Establish/upgrade home exercise program. Perform / instruct on therapeutic exercises, gait training, transfer training, and Range of Motion.    Occupational Therapy to evaluate and treat. Evaluate home environment for safety and equipment needs. Perform/Instruct on transfers, ADL training, ROM, and therapeutic exercises.     to evaluate for community resources/long-range planning.     Aide to provide assistance with personal care, ADLs, and vital signs    Send initial Home Health orders to HTS attending physician on call.  Send follow up questions to [phone] or fax: Heart Failure:      [phone]    Follow up questions also to Dr. Maico Encarnacion at [facility]: [phone]    Staying with Consuelo Sewell (sister) at:  [address] [place] [address]  Phone [phone]

## 2017-03-23 NOTE — PLAN OF CARE
Discharge planning:  Patient will d/c home with HH PT/OT and RW/Bath bench. Informed Dr. Luu of the above needs.

## 2017-03-23 NOTE — PROGRESS NOTES
Heart Failure Progress Note  Attending Physician: Landon Galeano MD  Hospital Day: 21    Subjective:   Interval History: No events reported overnight. Remains on  @ 2.5 mcg/kg/min. Otherwise no complaints, feeling better.    Medications:   Continuous Infusions:   DOBUTamine 2.5 mcg/kg/min (03/20/17 1717)    heparin (porcine) in D5W 19 Units/kg/hr (03/22/17 1811)       Scheduled Meds:   coenzyme Q10  400 mg Oral Daily    digoxin  0.125 mg Oral Every Mon, Wed, Fri    gabapentin  300 mg Oral QHS    hydrALAZINE  50 mg Oral QID (WM & HS)    isosorbide dinitrate  20 mg Oral QID (WM & HS)    levetiracetam  750 mg Oral BID    levocarnitine  330 mg Oral BID    mirtazapine  7.5 mg Oral QHS    senna-docusate 8.6-50 mg  2 tablet Oral BID    sodium chloride 0.9%  3 mL Intravenous Q8H    warfarin  10 mg Oral Daily     PRN Meds:alprazolam  Objective:     Vitals:  Temp:  [98.2 °F (36.8 °C)-98.5 °F (36.9 °C)]   Pulse:  []   Resp:  [16-18]   BP: ()/(51-65)   SpO2:  [96 %-98 %]  I/O's:    Intake/Output Summary (Last 24 hours) at 03/23/17 1205  Last data filed at 03/23/17 0559   Gross per 24 hour   Intake              597 ml   Output             1200 ml   Net             -603 ml        Constitutional: NAD, conversant  HEENT: Sclera anicteric, PERRLA, EOMI  Neck: No JVD, no carotid bruits  CV: RRR, no murmur, normal S1/S2  Pulm: CTAB, no wheezes, rales, or ronchi  GI: Abdomen soft, NTND, +BS  Extremities: No LE edema, warm and well perfused  Skin: No ecchymosis, erythema, or ulcers  Psych: AOx3, appropriate affect  Neuro: CNII-XII intact, no focal deficits    Labs:       Recent Labs  Lab 03/21/17  0527 03/22/17  0514 03/23/17  0442   * 126* 127*   K 4.3 4.2 4.2   CL 91* 92* 91*   CO2 19* 20* 18*   BUN 67* 65* 60*   CREATININE 2.4* 2.5* 2.2*   * 110 121*   ANIONGAP 17* 14 18*       Recent Labs  Lab 03/21/17  0527 03/22/17  0514 03/23/17  0442   * 100* 77*   ALT 34 32 33   ALKPHOS 190*  186* 198*   BILITOT 1.1* 1.0 1.2*   ALBUMIN 3.0* 3.0* 3.1*        Recent Labs  Lab 03/21/17  0527 03/22/17  0515 03/23/17  0442   WBC 8.77 9.19 8.97   HGB 10.4* 10.0* 10.2*   HCT 31.2* 28.6* 29.3*    305 334   GRAN 69.6  6.0 67.9  6.2 67.6  6.1       Recent Labs  Lab 03/21/17  0527 03/22/17  0515 03/23/17  0442   INR 1.7* 1.6* 1.7*     No results for input(s): TROPONINI, BNP in the last 168 hours.   Micro:   Blood Cultures  Lab Results   Component Value Date    LABBLOO No growth after 5 days. 03/10/2017    LABBLOO No growth after 5 days. 03/10/2017     Urine Cultures  Lab Results   Component Value Date    LABURIN No growth 03/10/2017       Imaging:   TTE (3/15/2017)  TEST DESCRIPTION   Technical Quality: This is a technically adequate study.     Aorta: The aortic root is normal in size, measuring 2.1 cm at sinotubular junction and 3.1 cm at Sinuses of Valsalva. The proximal ascending aorta is normal in size, measuring 2.5 cm across.     Left Atrium: The left atrial volume index is severely enlarged, measuring 64.37 cc/m2.     Left Ventricle: The left ventricle is normal in size, with an end-diastolic diameter of 6.7 cm, and an end-systolic diameter of 5.9 cm. LV wall thickness is normal, with the septum and the posterior wall each measuring 1.0 cm across. Relative wall thickness was normal at 0.30, and the LV mass index was increased at 219.9 g/m2 consistent with eccentric left ventricular hypertrophy. Global left ventricular systolic function appears severely depressed. Visually estimated ejection fraction is <10%. The LV Doppler derived stroke volume equals 28.0 ccs; at a measured heart rate of 103 bpm this results in a LV Doppler derived cardiac output of 2.9 L/min (CI = 1.78 L/min/m2). There is global hypokinesis.     Right Atrium: The right atrium is enlarged, measuring 6.7 cm in length and 5.3 cm in width in the apical view.     Right Ventricle: The right ventricle is enlarged measuring 5.2 cm at the  base in the apical right ventricle-focused view. Global right ventricular systolic function appears low normal to mildly depressed. Tricuspid annular plane systolic excursion (TAPSE) is 2.2 cm. Tissue Doppler-derived tricuspid annular peak systolic velocity (S prime) is 12.4 cm/s.     Aortic Valve:  Aortic valve is normal in structure with normal leaflet mobility.     Mitral Valve:  Mitral valve is normal in structure with normal leaflet mobility.     Tricuspid Valve:  Tricuspid valve is normal in structure with normal leaflet mobility.     Pulmonary Valve:  Pulmonary valve is normal in structure with normal leaflet mobility.     IVC: IVC is enlarged and collapses < 50% with a sniff, suggesting high right atrial pressure of 15 mmHg.     Shunt: This study was performed in conjunction with intravenous bubble contrast. There is evidence of a right to left shunt across the atrial septum.     Intracavitary: There is no evidence of pericardial effusion, intracavity mass, thrombi, or vegetation.     Other: If clinically indicated, a full Doppler study can be performed.     RV/LV ratio is 0.78 (RV/LV diameter ratio > 0.75 is associated with higher risk of RV failure in patients with continuous-flow LVADs).     CONCLUSIONS     1 - Eccentric hypertrophy.     2 - Severely depressed left ventricular systolic function (EF <10%).     3 - Biatrial enlargement.     4 - Right ventricular enlargement with low normal to mildly depressed systolic function.     5 - Increased central venous pressure.    EF   Date Value Ref Range Status   03/14/2017 9 (A) 55 - 65    03/03/2017 10 (A) 55 - 65    03/03/2017 10 (A) 55 - 65      Assessment:   37 y/o M with PMHx of HFrEF (EF <10%) 2/2 NICM s/p AICD (implanted 12/2013), MELAS (mitochondrial encephalopathy, lactic acidosis, and stroke-like episodes), Hx of LV thrombus, Atrial Flutter, CKD, seizure disorder, who was [facility] in cardiogenic shock and consideration for heart  transplant and/or advanced options. IABP now out, decreasing support, still on .    Patient has been denied advanced options when presented. Pathway was closed in step 1 due to inability to care for himself and lack of insight.    Plan:   HFrEF (NYHA Functional Class IV, ACC/AHA Stage D) 2/2 NICM, presented in Cardiogenic shock, improving, off IABP, still requiring inotrope; Net -0.6L last 24H, -22.7 L since admission  - Continue  @ 2.5 mcg/kg/min  - Stopped Bumex 2 mg Daily yesterday, this morning Cr improved to 2.2 from 2.5, euvolemic on exam  - Continue hydralazine 50 mg and ISDN at 20 mg Q6H, now able to tolerate all doses  - Continue to hold Aldactone 25 mg Daily with elevated Cr at 2.2 and K 4.2  - Continue digoxin 125 mcg MWF  - Not a candidate for advanced options, pathway stopped at Step 1     Leukocytosis, concern for sepsis, now resolved  - US with sludge; CXR with no pneumonia  - Blood and Urine Cx NGTD (sent 3/10/2017)  - Completed 7 day course of Vancomycin and Cefepime on 3/16/2017  - ID on consult, appreciate rec's     Cerebral Air Embolism  - CT on 3/9/2017 with venous air embolism within the cavernous sinuses and smaller component within the frontal sulci  - EEG normal, unable to get MRI 2/2 ICD  - TTE with bubble shows R to left shunt across the atrial septum  - Stroke team on consult, appreciate rec's     Atrial Flutter, s/p RENÉE/DCCV, currently in sinus rhythm  - INR 1.7 today, up from 1.6 yesterday  - Continue heparin gtt to bridge, continue Coumadin 10 mg tonight and re-assess tomorrow  - Follow up with Dr. Reyez 4 weeks after discharge    SOILA  - Continue home medications Keppra, L-carnitine and CoQ  - Vascular neurology on consult, appreciate rec's     Diet: Cardiac diet with boost  PPx: On heparin gtt while bridging to Coumadin  PT/OT    Code Status: FULL CODE  Disposition: Possible discharge by end of the week once INR therapeutic, will go home with home health and home . Follow  up with Dr. Maico Encarnacion at [facility]. Spoke with Consuelo Sewell (sister) this morning, patient will be staying with her after hospital discharge. Address placed in plan of care note for home health follow up. If home tomorrow, will be after 6PM as patient's sister works until then. Otherwise will plan for Saturday discharge pending INR.    Patient seen and examined this morning. Discussed with Dr. Manzo, staff attestation to follow.    Signed:  Mekih Caro MD  Cardiology Fellow - PGY4  Pager: [phone]  3/23/2017 12:12 PM

## 2017-03-24 VITALS
DIASTOLIC BLOOD PRESSURE: 71 MMHG | RESPIRATION RATE: 16 BRPM | SYSTOLIC BLOOD PRESSURE: 99 MMHG | TEMPERATURE: 99 F | HEIGHT: 72 IN | HEART RATE: 92 BPM | BODY MASS INDEX: 14.37 KG/M2 | WEIGHT: 106.06 LBS | OXYGEN SATURATION: 97 %

## 2017-03-24 LAB
ALBUMIN SERPL BCP-MCNC: 3 G/DL
ALP SERPL-CCNC: 183 U/L
ALT SERPL W/O P-5'-P-CCNC: 32 U/L
ANION GAP SERPL CALC-SCNC: 17 MMOL/L
AST SERPL-CCNC: 61 U/L
BASOPHILS # BLD AUTO: 0.05 K/UL
BASOPHILS NFR BLD: 0.6 %
BILIRUB SERPL-MCNC: 1.2 MG/DL
BUN SERPL-MCNC: 59 MG/DL
CALCIUM SERPL-MCNC: 9.4 MG/DL
CHLORIDE SERPL-SCNC: 92 MMOL/L
CO2 SERPL-SCNC: 19 MMOL/L
CREAT SERPL-MCNC: 2 MG/DL
DIFFERENTIAL METHOD: ABNORMAL
EOSINOPHIL # BLD AUTO: 0.1 K/UL
EOSINOPHIL NFR BLD: 0.8 %
ERYTHROCYTE [DISTWIDTH] IN BLOOD BY AUTOMATED COUNT: 15 %
EST. GFR  (AFRICAN AMERICAN): 48.2 ML/MIN/1.73 M^2
EST. GFR  (NON AFRICAN AMERICAN): 41.7 ML/MIN/1.73 M^2
FACT X PPP CHRO-ACNC: 0.55 IU/ML
GLUCOSE SERPL-MCNC: 118 MG/DL
HCT VFR BLD AUTO: 28.4 %
HGB BLD-MCNC: 9.5 G/DL
INR PPP: 2.1
LYMPHOCYTES # BLD AUTO: 1.7 K/UL
LYMPHOCYTES NFR BLD: 19.5 %
MAGNESIUM SERPL-MCNC: 2 MG/DL
MCH RBC QN AUTO: 29 PG
MCHC RBC AUTO-ENTMCNC: 33.5 %
MCV RBC AUTO: 87 FL
MONOCYTES # BLD AUTO: 0.9 K/UL
MONOCYTES NFR BLD: 10.4 %
NEUTROPHILS # BLD AUTO: 5.9 K/UL
NEUTROPHILS NFR BLD: 67.4 %
OSMOLALITY UR: 491 MOSM/KG
PHOSPHATE SERPL-MCNC: 4.4 MG/DL
PLATELET # BLD AUTO: 326 K/UL
PMV BLD AUTO: 10.7 FL
POTASSIUM SERPL-SCNC: 3.7 MMOL/L
PROT SERPL-MCNC: 7 G/DL
PROTHROMBIN TIME: 20.8 SEC
RBC # BLD AUTO: 3.28 M/UL
SODIUM SERPL-SCNC: 128 MMOL/L
WBC # BLD AUTO: 8.67 K/UL

## 2017-03-24 PROCEDURE — 85520 HEPARIN ASSAY: CPT

## 2017-03-24 PROCEDURE — 84100 ASSAY OF PHOSPHORUS: CPT

## 2017-03-24 PROCEDURE — 36415 COLL VENOUS BLD VENIPUNCTURE: CPT

## 2017-03-24 PROCEDURE — 83935 ASSAY OF URINE OSMOLALITY: CPT

## 2017-03-24 PROCEDURE — 80053 COMPREHEN METABOLIC PANEL: CPT

## 2017-03-24 PROCEDURE — 83735 ASSAY OF MAGNESIUM: CPT

## 2017-03-24 PROCEDURE — 85610 PROTHROMBIN TIME: CPT

## 2017-03-24 PROCEDURE — 99238 HOSP IP/OBS DSCHRG MGMT 30/<: CPT | Mod: ,,, | Performed by: INTERNAL MEDICINE

## 2017-03-24 PROCEDURE — 25000003 PHARM REV CODE 250: Performed by: INTERNAL MEDICINE

## 2017-03-24 PROCEDURE — 85025 COMPLETE CBC W/AUTO DIFF WBC: CPT

## 2017-03-24 PROCEDURE — 25000003 PHARM REV CODE 250: Performed by: PHYSICIAN ASSISTANT

## 2017-03-24 RX ORDER — HYDRALAZINE HYDROCHLORIDE 50 MG/1
50 TABLET, FILM COATED ORAL
Qty: 360 TABLET | Refills: 3 | Status: SHIPPED | OUTPATIENT
Start: 2017-03-24 | End: 2018-03-24

## 2017-03-24 RX ORDER — BUMETANIDE 2 MG/1
2 TABLET ORAL DAILY
Qty: 90 TABLET | Refills: 3 | Status: SHIPPED | OUTPATIENT
Start: 2017-03-24 | End: 2018-03-24

## 2017-03-24 RX ORDER — MIRTAZAPINE 7.5 MG/1
7.5 TABLET, FILM COATED ORAL NIGHTLY
Qty: 90 TABLET | Refills: 3 | Status: SHIPPED | OUTPATIENT
Start: 2017-03-24 | End: 2018-03-24

## 2017-03-24 RX ORDER — DIGOXIN 125 MCG
0.12 TABLET ORAL
Qty: 36 TABLET | Refills: 3 | Status: SHIPPED | OUTPATIENT
Start: 2017-03-24 | End: 2018-03-24

## 2017-03-24 RX ORDER — AMOXICILLIN 250 MG
2 CAPSULE ORAL 2 TIMES DAILY
COMMUNITY
Start: 2017-03-24

## 2017-03-24 RX ORDER — GABAPENTIN 300 MG/1
300 CAPSULE ORAL NIGHTLY
Qty: 90 CAPSULE | Refills: 3 | Status: SHIPPED | OUTPATIENT
Start: 2017-03-24 | End: 2018-03-24

## 2017-03-24 RX ORDER — WARFARIN 7.5 MG/1
7.5 TABLET ORAL DAILY
Qty: 30 TABLET | Refills: 11 | Status: SHIPPED | OUTPATIENT
Start: 2017-03-24 | End: 2018-03-24

## 2017-03-24 RX ORDER — EPINEPHRINE 0.22MG
400 AEROSOL WITH ADAPTER (ML) INHALATION DAILY
COMMUNITY
Start: 2017-03-24 | End: 2018-03-24

## 2017-03-24 RX ORDER — DOBUTAMINE HYDROCHLORIDE 400 MG/100ML
2.5 INJECTION INTRAVENOUS CONTINUOUS
Start: 2017-03-24

## 2017-03-24 RX ORDER — ISOSORBIDE DINITRATE 20 MG/1
20 TABLET ORAL
Qty: 360 TABLET | Refills: 3 | Status: SHIPPED | OUTPATIENT
Start: 2017-03-24 | End: 2018-03-24

## 2017-03-24 RX ORDER — LEVOCARNITINE 330 MG/1
990 TABLET ORAL 3 TIMES DAILY
Qty: 810 TABLET | Refills: 3 | Status: SHIPPED | OUTPATIENT
Start: 2017-03-24 | End: 2018-03-19

## 2017-03-24 RX ORDER — SPIRONOLACTONE 25 MG/1
12.5 TABLET ORAL DAILY
Qty: 45 TABLET | Refills: 3 | Status: SHIPPED | OUTPATIENT
Start: 2017-03-24 | End: 2018-03-24

## 2017-03-24 RX ADMIN — ISOSORBIDE DINITRATE 20 MG: 10 TABLET ORAL at 09:03

## 2017-03-24 RX ADMIN — HYDRALAZINE HYDROCHLORIDE 50 MG: 50 TABLET ORAL at 04:03

## 2017-03-24 RX ADMIN — LEVOCARNITINE 3.3 ML: 1 SOLUTION ORAL at 09:03

## 2017-03-24 RX ADMIN — STANDARDIZED SENNA CONCENTRATE AND DOCUSATE SODIUM 2 TABLET: 8.6; 5 TABLET, FILM COATED ORAL at 09:03

## 2017-03-24 RX ADMIN — DIGOXIN 0.12 MG: 125 TABLET ORAL at 04:03

## 2017-03-24 RX ADMIN — Medication 400 MG: at 09:03

## 2017-03-24 RX ADMIN — HYDRALAZINE HYDROCHLORIDE 50 MG: 50 TABLET ORAL at 11:03

## 2017-03-24 RX ADMIN — WARFARIN SODIUM 7.5 MG: 7.5 TABLET ORAL at 04:03

## 2017-03-24 RX ADMIN — LEVETIRACETAM 750 MG: 750 TABLET, FILM COATED ORAL at 09:03

## 2017-03-24 RX ADMIN — HYDRALAZINE HYDROCHLORIDE 50 MG: 50 TABLET ORAL at 09:03

## 2017-03-24 RX ADMIN — ISOSORBIDE DINITRATE 20 MG: 10 TABLET ORAL at 11:03

## 2017-03-24 RX ADMIN — ISOSORBIDE DINITRATE 20 MG: 10 TABLET ORAL at 04:03

## 2017-03-24 NOTE — PROGRESS NOTES
DISCHARGE    SW to pt's room for d/c plan. Pt presents as aaox3 with calm affect. Pt reports in agreement with plan to d/c home today with IV dobutamine via [facility] ph [phone] and home health via [facility] [phone]. SW confirmed [facility] and [facility] are aware of pt's d/c and have access to pt's chart via epic. Pt has had a shower chair and walker delivered to his room. Pt's sister will provide transportation home. No other needs reported at this time. SW providing psychosocial and counseling support, education, resources, and d/c planning as needed. SW remains available.

## 2017-03-24 NOTE — DISCHARGE SUMMARY
Discharge Summary  Heart Transplant Service      Admit Date: 3/3/2017    Discharge Date:  3/24/2017    Attending Physician: aZc Manzo Jr., MD  Discharge Physician: Mekhi Caro MD    Principal Diagnoses: Cardiogenic shock  NICM  MELAS    Discharged Condition: Good    Hospital Course:   36 y.o. gentleman with PMH of HFrEF (EF <10%) 2/2 NICM s/p AICD (implanted 12/2013), MELAS (mitochondrial encephalopathy, lactic acidosis, and stroke-like episodes), Hx of LV thrombus, Atrial Flutter, CKD, seizure disorder, who was transferred to [facility] [place] [facility] in cardiogenic shock and consideration for heart transplant and/or advanced options.     Per chart review, patient admitted to [facility] on 2/28/2017 after he was found down by family with laceration to head on day of admission, unclear if related to seizure disorder. He also had been experiencing worsening LOPEZ and B/L LE edema for 1-2 weeks prior to presentation at [facility]. CT Head at OSH with no acute CVA or hemorrhage, old R PCA stroke. Initial presentation at OSH with typical AFlutter 2:1 AV conduction rates 120's to 140's, OSH TTE with EF < 10%, and volume overloaded on exam. Troponin peak to 2.69, no chest pain reported, had elevated Cr to 3.4. Initially treated with Lasix gtt and , however had IABP placed on 3/2/2017 after minimal change to SvO2.     Also has Hx of LV thrombus, previously on Coumadin but now not on AC with resolution of thrombus. MELAS has been treated at [facility] with CoQ and levocarnitine.     Upon arrival to [facility], patient on RA, NAD, with IABP 1:1, tachycardic to , on  @ 5 mcg/kg/min, Bumex gtt @ 0.5 mg/hr, Vipul gtt @ 0.8 mcg/kg/min.Eventually had IABP removed, transitioned down to  @ 2.5 mcg/kg/min. Net negative 22L since admission. Completed 7 day course of Vancomycin and Cefepime on 3/16/2017 for presumed sepsis. CT on 3/9/2017 with venous air embolism within the cavernous sinuses and smaller component within the frontal sulci, EEG  normal, unable to get MRI 2/2 ICD, TTE with bubble showed R to left shunt across the atrial septum. Neurologic status eventually stabilized and back to baseline.    Patient was denied advanced options when presented, with pathway closed in step 1 due to inability to care for himself and lack of insight. Eventually transitioned to PO medications, se below.    Outpatient Plan:  - Continue hydralazine 50 mg and ISDN at 20 mg Q6H  - Will resume Bumex at 2 mg Daily, Aldactone 12.5 mg Daily  - Continue digoxin 125 mcg MWF  - Continue Coumadin at 7.5 mg Daily  - Will go home with sister (Consuelo Sewell), address is [address] [place] [address], Phone [phone]  - Follow up with Dr. Maico Encarnacion on 3/31/2017 between 8:00 and 8:30 AM (see address below)  - Follow up outpatient labs CMP, CBC, and PT/INR on 3/27/2017  - Home health with PT/OT, home  @ 2.5 mcg/kg/min  - Provided bedside commode, RW, and Shower bench    Diet: Cardiac diet    Activity: Ad johanny, wound care instructions provided    Disposition: Home Health, PT/OT, home  @ 2.5 mcg/kg/min    Discharge Medications:      Medication List      START taking these medications          bumetanide 2 MG tablet   Commonly known as:  BUMEX   Take 1 tablet (2 mg total) by mouth once daily.       coenzyme Q10 100 mg capsule   Take 4 capsules (400 mg total) by mouth once daily.       digoxin 125 mcg tablet   Commonly known as:  LANOXIN   Take 1 tablet (0.125 mg total) by mouth every Mon, Wed, Fri.       DOBUTamine 1,000 mg/250 mL (4,000 mcg/mL) infusion   Commonly known as:  DOBUTREX   Inject 162.25 mcg/min into the vein continuous.       gabapentin 300 MG capsule   Commonly known as:  NEURONTIN   Take 1 capsule (300 mg total) by mouth every evening.       hydrALAZINE 50 MG tablet   Commonly known as:  APRESOLINE   Take 1 tablet (50 mg total) by mouth 4 (four) times daily with meals and nightly.       isosorbide dinitrate 20 MG tablet   Commonly known as:   ISORDIL   Take 1 tablet (20 mg total) by mouth 4 (four) times daily with meals and nightly.       levocarnitine 330 mg Tab   Commonly known as:  CARNITOR   Take 3 tablets (990 mg total) by mouth 3 (three) times daily.       mirtazapine 7.5 MG Tab   Commonly known as:  REMERON   Take 1 tablet (7.5 mg total) by mouth every evening.       senna-docusate 8.6-50 mg 8.6-50 mg per tablet   Commonly known as:  PERICOLACE   Take 2 tablets by mouth 2 (two) times daily.       spironolactone 25 MG tablet   Commonly known as:  ALDACTONE   Take 0.5 tablets (12.5 mg total) by mouth once daily.       warfarin 7.5 MG tablet   Commonly known as:  COUMADIN   Take 1 tablet (7.5 mg total) by mouth Daily.         CONTINUE taking these medications          levetiracetam 750 MG Tab   Commonly known as:  KEPPRA         STOP taking these medications          furosemide 40 MG tablet   Commonly known as:  LASIX            Where to Get Your Medications      These medications were sent to [facility] [address] [place] [address] AT [address] [address]    Hours:  24-hours Phone:  [phone]     bumetanide 2 MG tablet    digoxin 125 mcg tablet    gabapentin 300 MG capsule    hydrALAZINE 50 MG tablet    isosorbide dinitrate 20 MG tablet    levocarnitine 330 mg Tab    mirtazapine 7.5 MG Tab    spironolactone 25 MG tablet    warfarin 7.5 MG tablet         You can get these medications from any pharmacy     You don't need a prescription for these medications     coenzyme Q10 100 mg capsule    senna-docusate 8.6-50 mg 8.6-50 mg per tablet         Information about where to get these medications is not yet available     ! Ask your nurse or doctor about these medications     DOBUTamine 1,000 mg/250 mL (4,000 mcg/mL) infusion           Follow Up:  Follow-up Information     Follow up with Maico Encarnacion MD. Go on 3/31/2017.    Specialty:  Cardiology     Contact information:    [address] [facility] [id number] [phone]

## 2017-03-24 NOTE — PLAN OF CARE
Problem: Patient Care Overview  Goal: Plan of Care Review  Outcome: Ongoing (interventions implemented as appropriate)  Plan of care discussed with patient. Patient is free of fall/trauma/injury. Denies CP, SOB, or pain/discomfort. Heparin discontinued. All questions addressed. Will continue to monitor

## 2017-03-24 NOTE — PROGRESS NOTES
Heart Failure Progress Note  Attending Physician: Landon Galeano MD  Hospital Day: 22    Subjective:   Interval History: No events reported overnight. Remains on  @ 2.5 mcg/kg/min. Otherwise no complaints, feeling better.    Medications:   Continuous Infusions:   DOBUTamine 2.5 mcg/kg/min (03/20/17 1717)       Scheduled Meds:   coenzyme Q10  400 mg Oral Daily    digoxin  0.125 mg Oral Every Mon, Wed, Fri    gabapentin  300 mg Oral QHS    hydrALAZINE  50 mg Oral QID (WM & HS)    isosorbide dinitrate  20 mg Oral QID (WM & HS)    levetiracetam  750 mg Oral BID    levocarnitine  330 mg Oral BID    mirtazapine  7.5 mg Oral QHS    senna-docusate 8.6-50 mg  2 tablet Oral BID    sodium chloride 0.9%  3 mL Intravenous Q8H    warfarin  7.5 mg Oral Daily     PRN Meds:alprazolam  Objective:     Vitals:  Temp:  [97.2 °F (36.2 °C)-98.8 °F (37.1 °C)]   Pulse:  []   Resp:  [16-18]   BP: ()/(55-67)   SpO2:  [93 %-99 %]  I/O's:    Intake/Output Summary (Last 24 hours) at 03/24/17 1145  Last data filed at 03/24/17 0900   Gross per 24 hour   Intake              969 ml   Output             1081 ml   Net             -112 ml        Constitutional: NAD, conversant  HEENT: Sclera anicteric, PERRLA, EOMI  Neck: No JVD, no carotid bruits  CV: RRR, no murmur, normal S1/S2  Pulm: CTAB, no wheezes, rales, or ronchi  GI: Abdomen soft, NTND, +BS  Extremities: No LE edema, warm and well perfused  Skin: No ecchymosis, erythema, or ulcers  Psych: AOx3, appropriate affect  Neuro: CNII-XII intact, no focal deficits    Labs:       Recent Labs  Lab 03/22/17  0514 03/23/17  0442 03/24/17  0548   * 127* 128*   K 4.2 4.2 3.7   CL 92* 91* 92*   CO2 20* 18* 19*   BUN 65* 60* 59*   CREATININE 2.5* 2.2* 2.0*    121* 118*   ANIONGAP 14 18* 17*       Recent Labs  Lab 03/22/17  0514 03/23/17  0442 03/24/17  0548   * 77* 61*   ALT 32 33 32   ALKPHOS 186* 198* 183*   BILITOT 1.0 1.2* 1.2*   ALBUMIN 3.0* 3.1* 3.0*         Recent Labs  Lab 03/22/17  0515 03/23/17  0442 03/24/17  0548   WBC 9.19 8.97 8.67   HGB 10.0* 10.2* 9.5*   HCT 28.6* 29.3* 28.4*    334 326   GRAN 67.9  6.2 67.6  6.1 67.4  5.9       Recent Labs  Lab 03/22/17  0515 03/23/17  0442 03/24/17  0548   INR 1.6* 1.7* 2.1*     No results for input(s): TROPONINI, BNP in the last 168 hours.   Micro:   Blood Cultures  Lab Results   Component Value Date    LABBLOO No growth after 5 days. 03/10/2017    LABBLOO No growth after 5 days. 03/10/2017     Urine Cultures  Lab Results   Component Value Date    LABURIN No growth 03/10/2017       Imaging:   TTE (3/15/2017)  TEST DESCRIPTION   Technical Quality: This is a technically adequate study.     Aorta: The aortic root is normal in size, measuring 2.1 cm at sinotubular junction and 3.1 cm at Sinuses of Valsalva. The proximal ascending aorta is normal in size, measuring 2.5 cm across.     Left Atrium: The left atrial volume index is severely enlarged, measuring 64.37 cc/m2.     Left Ventricle: The left ventricle is normal in size, with an end-diastolic diameter of 6.7 cm, and an end-systolic diameter of 5.9 cm. LV wall thickness is normal, with the septum and the posterior wall each measuring 1.0 cm across. Relative wall thickness was normal at 0.30, and the LV mass index was increased at 219.9 g/m2 consistent with eccentric left ventricular hypertrophy. Global left ventricular systolic function appears severely depressed. Visually estimated ejection fraction is <10%. The LV Doppler derived stroke volume equals 28.0 ccs; at a measured heart rate of 103 bpm this results in a LV Doppler derived cardiac output of 2.9 L/min (CI = 1.78 L/min/m2). There is global hypokinesis.     Right Atrium: The right atrium is enlarged, measuring 6.7 cm in length and 5.3 cm in width in the apical view.     Right Ventricle: The right ventricle is enlarged measuring 5.2 cm at the base in the apical right ventricle-focused view. Global  right ventricular systolic function appears low normal to mildly depressed. Tricuspid annular plane systolic excursion (TAPSE) is 2.2 cm. Tissue Doppler-derived tricuspid annular peak systolic velocity (S prime) is 12.4 cm/s.     Aortic Valve:  Aortic valve is normal in structure with normal leaflet mobility.     Mitral Valve:  Mitral valve is normal in structure with normal leaflet mobility.     Tricuspid Valve:  Tricuspid valve is normal in structure with normal leaflet mobility.     Pulmonary Valve:  Pulmonary valve is normal in structure with normal leaflet mobility.     IVC: IVC is enlarged and collapses < 50% with a sniff, suggesting high right atrial pressure of 15 mmHg.     Shunt: This study was performed in conjunction with intravenous bubble contrast. There is evidence of a right to left shunt across the atrial septum.     Intracavitary: There is no evidence of pericardial effusion, intracavity mass, thrombi, or vegetation.     Other: If clinically indicated, a full Doppler study can be performed.     RV/LV ratio is 0.78 (RV/LV diameter ratio > 0.75 is associated with higher risk of RV failure in patients with continuous-flow LVADs).     CONCLUSIONS     1 - Eccentric hypertrophy.     2 - Severely depressed left ventricular systolic function (EF <10%).     3 - Biatrial enlargement.     4 - Right ventricular enlargement with low normal to mildly depressed systolic function.     5 - Increased central venous pressure.    EF   Date Value Ref Range Status   03/14/2017 9 (A) 55 - 65    03/03/2017 10 (A) 55 - 65    03/03/2017 10 (A) 55 - 65      Assessment:   37 y/o M with PMHx of HFrEF (EF <10%) 2/2 NICM s/p AICD (implanted 12/2013), MELAS (mitochondrial encephalopathy, lactic acidosis, and stroke-like episodes), Hx of LV thrombus, Atrial Flutter, CKD, seizure disorder, who was [place] [facility] [place] [facility] in cardiogenic shock and consideration for heart transplant and/or advanced options. IABP now out, decreasing  support, still on .    Patient has been denied advanced options when presented. Pathway was closed in step 1 due to inability to care for himself and lack of insight.    Plan:   HFrEF (NYHA Functional Class IV, ACC/AHA Stage D) 2/2 NICM, presented in Cardiogenic shock, improving, off IABP, still requiring inotrope; Net even last 24H, -22.7 L since admission  - Continue  @ 2.5 mcg/kg/min  - Continuing to hold Bumex as Cr improving and clinically euvolemic, Cr improved to 2.0 from 2.2 yesterday  - Continue hydralazine 50 mg and ISDN at 20 mg Q6H, now able to tolerate all doses  - Continue to hold Aldactone 25 mg Daily with elevated Cr at 2.0 and K 3.7, will consider re-starting as outpatient once Cr stabalizes  - Continue digoxin 125 mcg MWF  - Not a candidate for advanced options, pathway stopped at Step 1     Leukocytosis, concern for sepsis, now resolved  - US with sludge; CXR with no pneumonia  - Blood and Urine Cx NGTD (sent 3/10/2017)  - Completed 7 day course of Vancomycin and Cefepime on 3/16/2017  - ID on consult, appreciate rec's     Cerebral Air Embolism  - CT on 3/9/2017 with venous air embolism within the cavernous sinuses and smaller component within the frontal sulci  - EEG normal, unable to get MRI 2/2 ICD  - TTE with bubble shows R to left shunt across the atrial septum  - Stroke team on consult, appreciate rec's     Atrial Flutter, s/p RENÉE/DCCV, currently in sinus rhythm  - INR 2.1 today, up from 1.7 yesterday  - Stopped heparin gtt this AM, continue Coumadin at 7.5 mg Daily  - Follow up with Dr. Reyez 4 weeks after discharge    SOILA  - Continue home medications Keppra, L-carnitine and CoQ  - Vascular neurology on consult, appreciate rec's     Diet: Cardiac diet with boost  PPx: Coumadin as above  PT/OT    Code Status: FULL CODE  Disposition: Discharge home this evening after 6PM, spoke with Consuelo Sewell (sister) yetserday, patient will be staying with her after hospital discharge. Consuelo's  address placed in plan of care note for home health follow up. Home health and PT/OT with home  @ 2.5 mcg/kg/min. Follow up with Dr. Maico Encarnacion at [facility].     Patient seen and examined this morning. Discussed with Dr. Manzo, staff attestation to follow.    Signed:  Mekhi Caro MD  Cardiology Fellow - PGY4  Pager: [phone]  3/24/2017 11:50 AM

## 2017-03-24 NOTE — PLAN OF CARE
Problem: Patient Care Overview  Goal: Plan of Care Review  Outcome: Ongoing (interventions implemented as appropriate)  POC reviewed with pt. VS stable. Pt d/c Saturday with home health and , if therapeutic INR. Pt remains free from falls, trauma, injury; pt tolerating POC well. Will continue to monitor.

## 2017-03-25 NOTE — PROGRESS NOTES
Patient given home medication regimen and discharge instructions. Patient prescriptions sent to pharmacy. Patient informed of follow up appointments and given a copy of discharge instructions with follow up appointments listed. Patient's telemetry monitor discontinued. Left wrist peripheral IV discontinued. Patient discharged home per MD orders with all personal belongings on home dobutamine pump. Walker and shower chair sent with patient.

## 2017-03-25 NOTE — PT/OT/SLP DISCHARGE
Physical Therapy Discharge Summary    Binh Conde  MRN: [id number]   Cardiogenic shock   Patient Discharged from acute Physical Therapy on 3/24/17.  Please refer to prior PT noted date on 3/22/17 for functional status.     Assessment:   Patient was discharge unexpectedly.  Information required to complete and accurate discharge summary is unknown.  Refer to therapy initial evaluation and last progress note for initial and most recent functional status and goal achievement.  Recommendations made may be found in medical record. Goals partially met.  GOALS:   Physical Therapy Goals     Not on file      Multidisciplinary Problems (Resolved)        Problem: Physical Therapy Goal    Goal Priority Disciplines Outcome Goal Variances Interventions   Physical Therapy Goal   (Resolved)     PT/OT, PT Outcome(s) achieved     Description:  Goals to be met by: 4/3/17 - Set at re-eval.    Patient will increase functional independence with mobility by performin. Supine to sit with Proctor. - met 3/22  2. Sit to stand transfer with Proctor.  3. Bed to chair transfer with Supervision (with/without use of rolling walker).  4. Gait  x 200 feet with Supervision and use of rolling walker.   5. Lower extremity exercise program x15 reps, with assistance as needed, in order to increase LE strength and (I) with functional mobility.                  Reasons for Discontinuation of Therapy Services  Transfer to alternate level of care.      Plan:  Patient Discharged to: Home with Home Health Service.    Mary Morton, PT, DPT   3/25/2017  [phone]

## 2017-03-27 ENCOUNTER — PATIENT OUTREACH (OUTPATIENT)
Dept: ADMINISTRATIVE | Facility: CLINIC | Age: 37
End: 2017-03-27
Payer: MEDICARE

## 2017-03-27 NOTE — PATIENT INSTRUCTIONS
Discharge Instructions for Atrial Fibrillation  You have been diagnosed with an abnormal heart rhythm called atrial fibrillation. With this condition, your hearts 2 upper chambers quiver rather than squeeze the blood out in a normal pattern. This leads to an irregular and sometimes rapid heartbeat. Some people will develop associated symptoms such as a flip-flopping heartbeat, chest pain, lightheadedness, or shortness of breath. Other people may have no symptoms at all. Atrial fibrillation is serious because it affects the hearts ability to fill with blood as it should. Blood clots may form. This increases the risk for stroke. Untreated atrial fibrillation can also lead to heart failure. Atrial fibrillation can be controlled. With treatment, most people with atrial fibrillation lead normal lives.  Treatment options  Recommended treatment for atrial fibrillation depends on your age, symptoms, how long you have had atrial fibrillation, and other factors. You will have a complete evaluation to find out if you have any abnormalities that caused your heart to go into atrial fibrillation. This might be blocked heart arteries or a thyroid problem. Your doctor will assess your particular case and discuss choices with you.  Treatment choices may include:  · Treating an underlying disorder that puts you at risk for atrial fibrillation. For example, correcting an abnormal thyroid or electrolyte problem, or treating a blocked heart artery.  · Restoring a normal heart rhythm with an electrical shock (cardioversion) or with an antiarrhythmic medicine (chemical cardioversion)  · Using medicine to control your heart rate in atrial fibrillation.  · Preventing the risk for blood clot and stroke using blood-thinning medicines. Your doctor will tell you what he or she recommends. Choices may include aspirin, clopidogrel, warfarin, dabigatran, rivaroxaban, apixaban, and edoxaban.  · Doing catheter ablation or a surgical maze  procedure. These use different methods to destroy certain areas of heart tissue. This interrupts the electrical signals causing atrial fibrillation. One of these procedures may be a choice when medicines do not work, or as an alternative to long-term medicine.  · Other treatment choices may be recommended for you by your doctor.  Managing risk factors for stroke and preventing heart failure are important parts of any treatment plan for atrial fibrillation.  Home care  · Take your medicines exactly as directed. Dont skip doses.  · Work with your doctor to find the right medicines and doses for you.  · Learn to take your own pulse. Keep a record of your results. Ask your doctor which pulse rates mean that you need medical attention. Slowing your pulse is often the goal of treatment. Ask your doctor if its OK for you to use an automatic machine to check your pulse at home. Sometimes these machines dont count the pulse correctly when you have atrial fibrillation.  · Limit your intake of coffee, tea, cola, and other beverages with caffeine. Talk with your doctor about whether you should eliminate caffeine.  · Avoid over-the-counter medicines that have caffeine in them.  · Let your doctor know what medicines you take, including prescription and over-the-counter medicines, as well as any supplements. They interfere with some medicines given for atrial fibrillation.  · Ask your doctor about whether you can drink alcohol. Some people need to avoid alcohol to better treat atrial fibrillation. If you are taking blood-thinner medicines, alcohol may interfere with them by increasing their effect.  · Never take stimulants such as amphetamines or cocaine. These drugs can speed up your heart rate and trigger atrial fibrillation.  Follow-up care  Follow up with your doctor, or as advised.     When should I call my healthcare provider  Call your healthcare provider right away if you have any of the  following:  · Weakness  · Dizziness  · Fainting  · Fatigue  · Shortness of breath  · Chest pain with increased activity  · A change in the usual regularity of your heartbeat, or an unusually fast heartbeat   Date Last Reviewed: 4/23/2016  © 2812-3336 [employer]. [address] [place] [address], [place] [address]. All rights reserved. This information is not intended as a substitute for professional medical care. Always follow your healthcare professional's instructions.

## 2017-03-28 ENCOUNTER — TELEPHONE (OUTPATIENT)
Dept: ADMINISTRATIVE | Facility: CLINIC | Age: 37
End: 2017-03-28

## 2017-04-04 ENCOUNTER — LAB VISIT (OUTPATIENT)
Dept: LAB | Facility: HOSPITAL | Age: 37
End: 2017-04-04
Attending: INTERNAL MEDICINE
Payer: MEDICARE

## 2017-04-04 DIAGNOSIS — I50.9 HEART FAILURE, UNSPECIFIED: Primary | ICD-10-CM

## 2017-04-04 LAB
ALBUMIN SERPL BCP-MCNC: 3.6 G/DL
ALP SERPL-CCNC: 198 U/L
ALT SERPL W/O P-5'-P-CCNC: 35 U/L
ANION GAP SERPL CALC-SCNC: 14 MMOL/L
AST SERPL-CCNC: 50 U/L
BASOPHILS # BLD AUTO: 0.03 K/UL
BASOPHILS NFR BLD: 0.4 %
BILIRUB SERPL-MCNC: 1 MG/DL
BUN SERPL-MCNC: 49 MG/DL
CALCIUM SERPL-MCNC: 9.5 MG/DL
CHLORIDE SERPL-SCNC: 90 MMOL/L
CO2 SERPL-SCNC: 26 MMOL/L
CREAT SERPL-MCNC: 1.8 MG/DL
DIFFERENTIAL METHOD: ABNORMAL
EOSINOPHIL # BLD AUTO: 0.1 K/UL
EOSINOPHIL NFR BLD: 0.9 %
ERYTHROCYTE [DISTWIDTH] IN BLOOD BY AUTOMATED COUNT: 15.8 %
EST. GFR  (AFRICAN AMERICAN): 54.8 ML/MIN/1.73 M^2
EST. GFR  (NON AFRICAN AMERICAN): 47.4 ML/MIN/1.73 M^2
GLUCOSE SERPL-MCNC: 119 MG/DL
HCT VFR BLD AUTO: 28.7 %
HGB BLD-MCNC: 9.7 G/DL
LYMPHOCYTES # BLD AUTO: 0.9 K/UL
LYMPHOCYTES NFR BLD: 12.3 %
MCH RBC QN AUTO: 28.9 PG
MCHC RBC AUTO-ENTMCNC: 33.8 %
MCV RBC AUTO: 85 FL
MONOCYTES # BLD AUTO: 0.8 K/UL
MONOCYTES NFR BLD: 10.6 %
NEUTROPHILS # BLD AUTO: 5.6 K/UL
NEUTROPHILS NFR BLD: 75.7 %
PLATELET # BLD AUTO: 262 K/UL
PMV BLD AUTO: 10.1 FL
POTASSIUM SERPL-SCNC: 3.1 MMOL/L
PROT SERPL-MCNC: 7.5 G/DL
RBC # BLD AUTO: 3.36 M/UL
SODIUM SERPL-SCNC: 130 MMOL/L
WBC # BLD AUTO: 7.39 K/UL

## 2017-04-04 PROCEDURE — 80053 COMPREHEN METABOLIC PANEL: CPT

## 2017-04-04 PROCEDURE — 85025 COMPLETE CBC W/AUTO DIFF WBC: CPT

## 2017-04-11 ENCOUNTER — LAB VISIT (OUTPATIENT)
Dept: LAB | Facility: HOSPITAL | Age: 37
End: 2017-04-11
Attending: INTERNAL MEDICINE
Payer: MEDICARE

## 2017-04-11 DIAGNOSIS — I50.23 ACUTE ON CHRONIC SYSTOLIC HEART FAILURE: Primary | ICD-10-CM

## 2017-04-11 LAB
ALBUMIN SERPL BCP-MCNC: 3.4 G/DL
ALP SERPL-CCNC: 200 U/L
ALT SERPL W/O P-5'-P-CCNC: 28 U/L
ANION GAP SERPL CALC-SCNC: 19 MMOL/L
AST SERPL-CCNC: 42 U/L
BASOPHILS # BLD AUTO: 0.03 K/UL
BASOPHILS NFR BLD: 0.4 %
BILIRUB SERPL-MCNC: 1.4 MG/DL
BUN SERPL-MCNC: 58 MG/DL
CALCIUM SERPL-MCNC: 9.3 MG/DL
CHLORIDE SERPL-SCNC: 85 MMOL/L
CO2 SERPL-SCNC: 28 MMOL/L
CREAT SERPL-MCNC: 2.2 MG/DL
DIFFERENTIAL METHOD: ABNORMAL
EOSINOPHIL # BLD AUTO: 0.1 K/UL
EOSINOPHIL NFR BLD: 1 %
ERYTHROCYTE [DISTWIDTH] IN BLOOD BY AUTOMATED COUNT: 15.9 %
EST. GFR  (AFRICAN AMERICAN): 43 ML/MIN/1.73 M^2
EST. GFR  (NON AFRICAN AMERICAN): 37.2 ML/MIN/1.73 M^2
GLUCOSE SERPL-MCNC: 95 MG/DL
HCT VFR BLD AUTO: 29.4 %
HGB BLD-MCNC: 9.7 G/DL
LYMPHOCYTES # BLD AUTO: 0.9 K/UL
LYMPHOCYTES NFR BLD: 12.8 %
MCH RBC QN AUTO: 27.6 PG
MCHC RBC AUTO-ENTMCNC: 33 %
MCV RBC AUTO: 84 FL
MONOCYTES # BLD AUTO: 0.9 K/UL
MONOCYTES NFR BLD: 12.9 %
NEUTROPHILS # BLD AUTO: 4.9 K/UL
NEUTROPHILS NFR BLD: 72.6 %
PLATELET # BLD AUTO: 228 K/UL
PMV BLD AUTO: 10.4 FL
POTASSIUM SERPL-SCNC: 2.9 MMOL/L
PROT SERPL-MCNC: 7.2 G/DL
RBC # BLD AUTO: 3.51 M/UL
SODIUM SERPL-SCNC: 132 MMOL/L
WBC # BLD AUTO: 6.72 K/UL

## 2017-04-11 PROCEDURE — 85025 COMPLETE CBC W/AUTO DIFF WBC: CPT

## 2017-04-11 PROCEDURE — 80053 COMPREHEN METABOLIC PANEL: CPT

## 2017-04-18 ENCOUNTER — LAB VISIT (OUTPATIENT)
Dept: LAB | Facility: HOSPITAL | Age: 37
End: 2017-04-18
Attending: INTERNAL MEDICINE
Payer: MEDICARE

## 2017-04-18 DIAGNOSIS — I50.23 ACUTE ON CHRONIC SYSTOLIC HEART FAILURE: Primary | ICD-10-CM

## 2017-04-18 LAB
ALBUMIN SERPL BCP-MCNC: 3.6 G/DL
ALP SERPL-CCNC: 194 U/L
ALT SERPL W/O P-5'-P-CCNC: 25 U/L
ANION GAP SERPL CALC-SCNC: 16 MMOL/L
AST SERPL-CCNC: 42 U/L
BASOPHILS # BLD AUTO: 0.02 K/UL
BASOPHILS NFR BLD: 0.3 %
BILIRUB SERPL-MCNC: 2.2 MG/DL
BUN SERPL-MCNC: 47 MG/DL
CALCIUM SERPL-MCNC: 9.7 MG/DL
CHLORIDE SERPL-SCNC: 89 MMOL/L
CO2 SERPL-SCNC: 26 MMOL/L
CREAT SERPL-MCNC: 2.1 MG/DL
DIFFERENTIAL METHOD: ABNORMAL
EOSINOPHIL # BLD AUTO: 0.1 K/UL
EOSINOPHIL NFR BLD: 0.9 %
ERYTHROCYTE [DISTWIDTH] IN BLOOD BY AUTOMATED COUNT: 16.5 %
EST. GFR  (AFRICAN AMERICAN): 45.4 ML/MIN/1.73 M^2
EST. GFR  (NON AFRICAN AMERICAN): 39.3 ML/MIN/1.73 M^2
GLUCOSE SERPL-MCNC: 129 MG/DL
HCT VFR BLD AUTO: 31.2 %
HGB BLD-MCNC: 10.2 G/DL
LYMPHOCYTES # BLD AUTO: 0.9 K/UL
LYMPHOCYTES NFR BLD: 14.1 %
MCH RBC QN AUTO: 26.8 PG
MCHC RBC AUTO-ENTMCNC: 32.7 %
MCV RBC AUTO: 82 FL
MONOCYTES # BLD AUTO: 0.8 K/UL
MONOCYTES NFR BLD: 11.9 %
NEUTROPHILS # BLD AUTO: 4.7 K/UL
NEUTROPHILS NFR BLD: 72.6 %
PLATELET # BLD AUTO: 245 K/UL
PMV BLD AUTO: 10.9 FL
POTASSIUM SERPL-SCNC: 3.6 MMOL/L
PROT SERPL-MCNC: 7.2 G/DL
RBC # BLD AUTO: 3.8 M/UL
SODIUM SERPL-SCNC: 131 MMOL/L
WBC # BLD AUTO: 6.45 K/UL

## 2017-04-18 PROCEDURE — 85025 COMPLETE CBC W/AUTO DIFF WBC: CPT

## 2017-04-18 PROCEDURE — 80053 COMPREHEN METABOLIC PANEL: CPT

## 2017-04-25 ENCOUNTER — LAB VISIT (OUTPATIENT)
Dept: LAB | Facility: HOSPITAL | Age: 37
End: 2017-04-25
Attending: INTERNAL MEDICINE
Payer: MEDICARE

## 2017-04-25 DIAGNOSIS — I50.23 ACUTE ON CHRONIC SYSTOLIC HEART FAILURE: ICD-10-CM

## 2017-04-25 DIAGNOSIS — E88.41: Primary | ICD-10-CM

## 2017-04-25 DIAGNOSIS — Z51.81 ENCOUNTER FOR THERAPEUTIC DRUG MONITORING: ICD-10-CM

## 2017-04-25 LAB
ALBUMIN SERPL BCP-MCNC: 3.6 G/DL
ALP SERPL-CCNC: 239 U/L
ALT SERPL W/O P-5'-P-CCNC: 33 U/L
ANION GAP SERPL CALC-SCNC: 19 MMOL/L
AST SERPL-CCNC: 59 U/L
BASOPHILS # BLD AUTO: 0.04 K/UL
BASOPHILS NFR BLD: 0.6 %
BILIRUB SERPL-MCNC: 2.1 MG/DL
BUN SERPL-MCNC: 46 MG/DL
CALCIUM SERPL-MCNC: 9.7 MG/DL
CHLORIDE SERPL-SCNC: 91 MMOL/L
CO2 SERPL-SCNC: 20 MMOL/L
CREAT SERPL-MCNC: 2.4 MG/DL
DIFFERENTIAL METHOD: ABNORMAL
EOSINOPHIL # BLD AUTO: 0.1 K/UL
EOSINOPHIL NFR BLD: 0.9 %
ERYTHROCYTE [DISTWIDTH] IN BLOOD BY AUTOMATED COUNT: 17.2 %
EST. GFR  (AFRICAN AMERICAN): 38.7 ML/MIN/1.73 M^2
EST. GFR  (NON AFRICAN AMERICAN): 33.4 ML/MIN/1.73 M^2
GLUCOSE SERPL-MCNC: 149 MG/DL
HCT VFR BLD AUTO: 32 %
HGB BLD-MCNC: 10 G/DL
LYMPHOCYTES # BLD AUTO: 1.2 K/UL
LYMPHOCYTES NFR BLD: 17.7 %
MCH RBC QN AUTO: 26.1 PG
MCHC RBC AUTO-ENTMCNC: 31.3 %
MCV RBC AUTO: 84 FL
MONOCYTES # BLD AUTO: 0.8 K/UL
MONOCYTES NFR BLD: 11.9 %
NEUTROPHILS # BLD AUTO: 4.8 K/UL
NEUTROPHILS NFR BLD: 68.8 %
PLATELET # BLD AUTO: 248 K/UL
PMV BLD AUTO: 11.4 FL
POTASSIUM SERPL-SCNC: 4.4 MMOL/L
PROT SERPL-MCNC: 7.2 G/DL
RBC # BLD AUTO: 3.83 M/UL
SODIUM SERPL-SCNC: 130 MMOL/L
WBC # BLD AUTO: 6.91 K/UL

## 2017-04-25 PROCEDURE — 85025 COMPLETE CBC W/AUTO DIFF WBC: CPT

## 2017-04-25 PROCEDURE — 80053 COMPREHEN METABOLIC PANEL: CPT

## 2017-05-02 ENCOUNTER — LAB VISIT (OUTPATIENT)
Dept: LAB | Facility: HOSPITAL | Age: 37
End: 2017-05-02
Attending: INTERNAL MEDICINE
Payer: MEDICARE

## 2017-05-02 DIAGNOSIS — I50.23 ACUTE ON CHRONIC SYSTOLIC HEART FAILURE: Primary | ICD-10-CM

## 2017-05-02 DIAGNOSIS — E88.41: ICD-10-CM

## 2017-05-02 DIAGNOSIS — Z79.01 LONG TERM (CURRENT) USE OF ANTICOAGULANTS: ICD-10-CM

## 2017-05-02 DIAGNOSIS — Z51.81 ENCOUNTER FOR THERAPEUTIC DRUG MONITORING: ICD-10-CM

## 2017-05-02 LAB
ALBUMIN SERPL BCP-MCNC: 3.6 G/DL
ALP SERPL-CCNC: 281 U/L
ALT SERPL W/O P-5'-P-CCNC: 65 U/L
ANION GAP SERPL CALC-SCNC: 20 MMOL/L
AST SERPL-CCNC: 115 U/L
BASOPHILS # BLD AUTO: 0.03 K/UL
BASOPHILS NFR BLD: 0.5 %
BILIRUB SERPL-MCNC: 3.8 MG/DL
BUN SERPL-MCNC: 64 MG/DL
CALCIUM SERPL-MCNC: 9.9 MG/DL
CHLORIDE SERPL-SCNC: 81 MMOL/L
CO2 SERPL-SCNC: 22 MMOL/L
CREAT SERPL-MCNC: 3 MG/DL
DIFFERENTIAL METHOD: ABNORMAL
EOSINOPHIL # BLD AUTO: 0 K/UL
EOSINOPHIL NFR BLD: 0.6 %
ERYTHROCYTE [DISTWIDTH] IN BLOOD BY AUTOMATED COUNT: 17.3 %
EST. GFR  (AFRICAN AMERICAN): 29.5 ML/MIN/1.73 M^2
EST. GFR  (NON AFRICAN AMERICAN): 25.5 ML/MIN/1.73 M^2
GLUCOSE SERPL-MCNC: 139 MG/DL
HCT VFR BLD AUTO: 31 %
HGB BLD-MCNC: 9.9 G/DL
INR PPP: 9.7
LYMPHOCYTES # BLD AUTO: 1.4 K/UL
LYMPHOCYTES NFR BLD: 20.8 %
MCH RBC QN AUTO: 25.1 PG
MCHC RBC AUTO-ENTMCNC: 31.9 %
MCV RBC AUTO: 79 FL
MONOCYTES # BLD AUTO: 0.8 K/UL
MONOCYTES NFR BLD: 12.8 %
NEUTROPHILS # BLD AUTO: 4.3 K/UL
NEUTROPHILS NFR BLD: 65.1 %
PLATELET # BLD AUTO: 202 K/UL
PMV BLD AUTO: 11.8 FL
POTASSIUM SERPL-SCNC: 4.6 MMOL/L
PROT SERPL-MCNC: 6.9 G/DL
PROTHROMBIN TIME: >100 SEC
RBC # BLD AUTO: 3.95 M/UL
SODIUM SERPL-SCNC: 123 MMOL/L
WBC # BLD AUTO: 6.55 K/UL

## 2017-05-02 PROCEDURE — 85025 COMPLETE CBC W/AUTO DIFF WBC: CPT

## 2017-05-02 PROCEDURE — 85610 PROTHROMBIN TIME: CPT

## 2017-05-02 PROCEDURE — 80053 COMPREHEN METABOLIC PANEL: CPT

## 2017-05-09 ENCOUNTER — TELEPHONE (OUTPATIENT)
Dept: FAMILY MEDICINE | Facility: CLINIC | Age: 37
End: 2017-05-09

## 2017-05-09 ENCOUNTER — LAB VISIT (OUTPATIENT)
Dept: LAB | Facility: HOSPITAL | Age: 37
End: 2017-05-09
Attending: INTERNAL MEDICINE
Payer: MEDICARE

## 2017-05-09 DIAGNOSIS — E88.41: Primary | ICD-10-CM

## 2017-05-09 DIAGNOSIS — I50.23 ACUTE ON CHRONIC SYSTOLIC HEART FAILURE: ICD-10-CM

## 2017-05-09 LAB
ALBUMIN SERPL BCP-MCNC: 3.4 G/DL
ALP SERPL-CCNC: 268 U/L
ALT SERPL W/O P-5'-P-CCNC: 45 U/L
ANION GAP SERPL CALC-SCNC: 17 MMOL/L
AST SERPL-CCNC: 63 U/L
BASOPHILS # BLD AUTO: 0.02 K/UL
BASOPHILS NFR BLD: 0.4 %
BILIRUB SERPL-MCNC: 4 MG/DL
BUN SERPL-MCNC: 61 MG/DL
CALCIUM SERPL-MCNC: 9.4 MG/DL
CHLORIDE SERPL-SCNC: 78 MMOL/L
CO2 SERPL-SCNC: 24 MMOL/L
CREAT SERPL-MCNC: 2.5 MG/DL
DIFFERENTIAL METHOD: ABNORMAL
EOSINOPHIL # BLD AUTO: 0.1 K/UL
EOSINOPHIL NFR BLD: 0.9 %
ERYTHROCYTE [DISTWIDTH] IN BLOOD BY AUTOMATED COUNT: 17.6 %
EST. GFR  (AFRICAN AMERICAN): 36.8 ML/MIN/1.73 M^2
EST. GFR  (NON AFRICAN AMERICAN): 31.8 ML/MIN/1.73 M^2
GLUCOSE SERPL-MCNC: 162 MG/DL
HCT VFR BLD AUTO: 30.1 %
HGB BLD-MCNC: 9.7 G/DL
LYMPHOCYTES # BLD AUTO: 0.9 K/UL
LYMPHOCYTES NFR BLD: 16.2 %
MCH RBC QN AUTO: 24.5 PG
MCHC RBC AUTO-ENTMCNC: 32.2 %
MCV RBC AUTO: 76 FL
MONOCYTES # BLD AUTO: 0.7 K/UL
MONOCYTES NFR BLD: 12.7 %
NEUTROPHILS # BLD AUTO: 3.8 K/UL
NEUTROPHILS NFR BLD: 69.8 %
PLATELET # BLD AUTO: 173 K/UL
PMV BLD AUTO: 11.8 FL
POTASSIUM SERPL-SCNC: 3.9 MMOL/L
PROT SERPL-MCNC: 6.7 G/DL
RBC # BLD AUTO: 3.96 M/UL
SODIUM SERPL-SCNC: 119 MMOL/L
WBC # BLD AUTO: 5.5 K/UL

## 2017-05-09 PROCEDURE — 85025 COMPLETE CBC W/AUTO DIFF WBC: CPT

## 2017-05-09 PROCEDURE — 80053 COMPREHEN METABOLIC PANEL: CPT

## 2017-05-09 NOTE — TELEPHONE ENCOUNTER
Received a call from the lab in about 6 PM for a sodium of 119.  I review of his labs he has a chronically low sodium although this is lower than usual.  Apparently ordered by Dr. Anderson but apparently does not Dr. within [facility].  There are no clinic notes within our system.  I did leave a message at the only number listed for the patient stating that the sodium was low and that he needs to contact his doctor as soon as possible

## 2017-05-16 ENCOUNTER — LAB VISIT (OUTPATIENT)
Dept: LAB | Facility: HOSPITAL | Age: 37
End: 2017-05-16
Attending: INTERNAL MEDICINE
Payer: MEDICARE

## 2017-05-16 DIAGNOSIS — I50.23 ACUTE ON CHRONIC SYSTOLIC HEART FAILURE: ICD-10-CM

## 2017-05-16 DIAGNOSIS — E88.41: Primary | ICD-10-CM

## 2017-05-16 LAB
ALBUMIN SERPL BCP-MCNC: 3.3 G/DL
ALP SERPL-CCNC: 229 U/L
ALT SERPL W/O P-5'-P-CCNC: 30 U/L
ANION GAP SERPL CALC-SCNC: 18 MMOL/L
AST SERPL-CCNC: 53 U/L
BASOPHILS # BLD AUTO: 0.03 K/UL
BASOPHILS NFR BLD: 0.6 %
BILIRUB SERPL-MCNC: 5.1 MG/DL
BUN SERPL-MCNC: 61 MG/DL
CALCIUM SERPL-MCNC: 9.4 MG/DL
CHLORIDE SERPL-SCNC: 84 MMOL/L
CO2 SERPL-SCNC: 23 MMOL/L
CREAT SERPL-MCNC: 2.2 MG/DL
DIFFERENTIAL METHOD: ABNORMAL
EOSINOPHIL # BLD AUTO: 0 K/UL
EOSINOPHIL NFR BLD: 0.8 %
ERYTHROCYTE [DISTWIDTH] IN BLOOD BY AUTOMATED COUNT: 18.6 %
EST. GFR  (AFRICAN AMERICAN): 43 ML/MIN/1.73 M^2
EST. GFR  (NON AFRICAN AMERICAN): 37.2 ML/MIN/1.73 M^2
GLUCOSE SERPL-MCNC: 119 MG/DL
HCT VFR BLD AUTO: 31.7 %
HGB BLD-MCNC: 10.1 G/DL
LYMPHOCYTES # BLD AUTO: 0.7 K/UL
LYMPHOCYTES NFR BLD: 14.1 %
MCH RBC QN AUTO: 24.4 PG
MCHC RBC AUTO-ENTMCNC: 31.9 %
MCV RBC AUTO: 77 FL
MONOCYTES # BLD AUTO: 0.8 K/UL
MONOCYTES NFR BLD: 15.2 %
NEUTROPHILS # BLD AUTO: 3.5 K/UL
NEUTROPHILS NFR BLD: 69.3 %
PLATELET # BLD AUTO: 136 K/UL
PLATELET BLD QL SMEAR: ABNORMAL
PMV BLD AUTO: ABNORMAL FL
POTASSIUM SERPL-SCNC: 4.4 MMOL/L
PROT SERPL-MCNC: 6.8 G/DL
RBC # BLD AUTO: 4.14 M/UL
SODIUM SERPL-SCNC: 125 MMOL/L
WBC # BLD AUTO: 5.12 K/UL

## 2017-05-16 PROCEDURE — 80053 COMPREHEN METABOLIC PANEL: CPT

## 2017-05-16 PROCEDURE — 85025 COMPLETE CBC W/AUTO DIFF WBC: CPT

## 2019-07-10 NOTE — H&P
[facility] History & Physical  Attending Physician: Romelia Carpenter MD  Chief Complaint: Cardiogenic Shock     HPI:   36 y.o. gentleman with PMH of HFrEF (EF <10%) 2/2 NICM s/p AICD (implanted 12/2013), MELAS (mitochondrial encephalopathy, lactic acidosis, and stroke-like episodes), Hx of LV thrombus, Atrial Flutter, CKD, seizure disorder, who is now transferred to [facility] from [facility] in cardiogenic shock and consideration for heart transplant and/or advanced options.    Per chart review, patient admitted to [facility] on 2/28/2017 after he was found down by family with laceration to head on day of admission, unclear if related to seizure disorder. He also had been experiencing worsening LOPEZ and B/L LE edema for 1-2 weeks prior to presentation at [facility]. CT Head at OSH with no acute CVA or hemorrhage, old R PCA stroke. Initial presentation at OSH with typical AFlutter 2:1 AV conduction rates 120's to 140's, OSH TTE with EF < 10%, and volume overloaded on exam. Troponin peak to 2.69, no chest pain reported, had elevated Cr to 3.4. Initially treated with Lasix gtt and , however had IABP placed on 3/2/2017 after minimal change to SvO2.    Also has Hx of LV thrombus, previously on Coumadin but now not on AC with resolution of thrombus. MELAS has been treated at [facility] with CoQ and levocarnitine.    Upon arrival to [facility], patient on RA, NAD, with IABP 1:1, tachycardic to , on  @ 5 mcg/kg/min, Bumex gtt @ 0.5 mg/hr, Vipul gtt @ 0.8 mcg/kg/min. Has RIJ Cordis with [place] in place, R radial A-Line, R CFV Cordis, and IABP with R CFA access. Patient warm and wet on exam, good perfusion, 2+ DP pulses B/L. MAPs maintaining in the 70-75 mmHg range.    Secured R Radial A-line with suture at presentation.  Weaned Vipul off within first hour of presentation, MAPs maintaining in the mid 60's.  Initial CVP 16, SvO2 53, drawn immediately after Vipul had been turned off.  Telemetry looks to be AFlutter 2:1 Av Conduction, rate 140.  Labs, ECG, CXR  pending.    ROS:    Constitution: Negative for fever, chills, weight loss or gain.   HENT: Negative for sore throat, rhinorrhea, or headache.  Eyes: Negative for blurred or double vision.   Cardiovascular: See above  Pulmonary: Positive for SOB   Gastrointestinal: Negative for abdominal pain, nausea, vomiting, or diarrhea.   : Negative for dysuria.   Neurological: Negative for focal weakness or sensory changes.  PMH:   No past medical history on file.  No past surgical history on file.  Allergies:   Review of patient's allergies indicates:  No Known Allergies  Medications:     No current facility-administered medications on file prior to encounter.      No current outpatient prescriptions on file prior to encounter.       Inpatient Medications   Continuous Infusions:   DOBUTamine      furosemide (LASIX) 5 mg/mL infusion (non-titrating)      heparin (porcine) in D5W       Scheduled Meds:   heparin (PORCINE)  70 Units/kg (Dosing Weight) Intravenous Once    phenylephrine        sodium chloride 0.9%  3 mL Intravenous Q8H     PRN Meds:heparin (PORCINE), heparin (PORCINE)     Social History:     Social History   Substance Use Topics    Smoking status: Not on file    Smokeless tobacco: Not on file    Alcohol use Not on file     Family History:   No family history on file.  Physical Exam:     Vitals:  Pulse:  [137-140]   Resp:  [15-17]   BP: (97)/(53)   SpO2:  [100 %]  on RA I/O's:  No intake or output data in the 24 hours ending 03/03/17 0632     Constitutional: NAD, conversant  HEENT: Sclera anicteric, PERRLA, EOMI  Neck: Unable to visualize JVD, no carotid bruits  CV: Tachy, no murmur, normal S1/S2  Pulm: Bibasilar crackles  GI: Abdomen soft, NTND, +BS  Extremities: Trace LE edema, warm and well perfused, 2+ DP pulses  Skin: No ecchymosis, erythema, or ulcers  Psych: AOx3, appropriate affect  Neuro: No gross deficits    Labs:     No results for input(s): NA, K, CL, CO2, BUN, CREATININE, GLUCOSE, ANIONGAP in  the last 168 hours.  No results for input(s): AST, ALT, ALKPHOS, BILITOT, BILIDIR, ALBUMIN in the last 168 hours.  No results for input(s): TROPONINI, BNP in the last 168 hours. No results for input(s): WBC, HGB, HCT, PLT, GRAN in the last 168 hours.  No results for input(s): PTT, INR in the last 168 hours.  No results found for: CHOL, HDL, LDLCALC, TRIG  No results found for: HGBA1C     Micro:  Blood Cultures  No results found for: LABBLOO  Urine Cultures  No results found for: LABURIN    Imaging:     No results found for: EF    Assessment:   36 y.o. gentleman with PMH of HFrEF (EF <10%) 2/2 NICM s/p AICD (implanted 12/2013), MELAS (mitochondrial encephalopathy, lactic acidosis, and stroke-like episodes), Hx of LV thrombus, Atrial Flutter, CKD, seizure disorder, who [place] now [place] [facility] [place] [facility] in cardiogenic shock and consideration for heart transplant and/or advanced options.    Upon [place] [facility] patient on RA, NAD, with IABP 1:1, tachycardic to , on  @ 5 mcg/kg/min, Bumex gtt @ 0.5 mg/hr, Vpiul gtt @ 0.8 mcg/kg/min. Has RIJ Cordis with [place] in place, R radial A-Line, RCFV Cordis, and IABP with R CFA access. Patient warm and wet on exam, good perfusion, 2+ DP pulses B/L. MAPs maintaining in the 70-75 mmHg range.    Secured R Radial A-line with suture at presentation.  Weaned Vipul off within first hour of presentation, MAPs maintaining in the mid 60's.  Initial CVP 16, SvO2 53, drawn immediately after Vipul had been turned off.  Telemetry looks to be AFlutter 2:1 Av Conduction, rate 140.  Labs, ECG, CXR pending.    1. Cardiogenic shock, on IABP 1:1,  @ 5 mcg/kg/min  2. HFrEF 22/ NICM, OSH TTE with EF 10%  3. AFlutter with 2:1 AV Conduction  4. MELAS (mitochondrial encephalopathy, lactic acidosis, and stroke-like episodes), diagnosed with muscle biopsy positive for ragged red fibers  5. Seizure disorder  6. Hx of LV thrombus    Plan:   - Will transition to Lasix gtt, give IVP 80 mg and start at  Lasix gtt @ 20 mg/hr  - Weaned off Vipul as above, will consider adding Levo or Vipul to maintain MAP > 65  - Continue  @ 5 mcg/kg/min  - Will turn IABP down to 1:2, start heparin gtt prior  - Repeat SvO2 in 2-3 hours since Vipul just turned off  - RENÉE/DCCV today for AFlutter, keep NPO  - Consult neurology for MELAS, will resume levocarnitine and keppra  - Additional rec's in AM    Patient seen and examined this morning. Discussed with HTS fellow (Dr. Sharan Clark).    Signed:  Mekhi Caro MD  Cardiology Fellow, PGY-4  Pager: [phone]  3/3/2017 5:21 AM     Home

## 2020-07-25 NOTE — OP NOTE
DATE OF PROCEDURE:  03/17/2017.    PREOPERATIVE DIAGNOSES:  Congestive heart failure.    POSTOPERATIVE DIAGNOSIS:  Congestive heart failure.    PROCEDURE PERFORMED:  Insertion of right subclavian Emmanuel catheter.    SURGEON:  Sergio Betts M.D.    ASSISTANT:  Lorrie Nix M.D. (RES).    ESTIMATED BLOOD LOSS:  Minimal.    COMPLICATIONS:  None.    INDICATIONS:  A 36-year-old heart failure patient requiring infusional   dobutamine therapy.    OPERATIVE REPORT IN DETAIL:  The patient was brought to the Operating Room and   placed on the operating table in supine position, prepped and draped in sterile   fashion.  Once satisfactory intravenous sedation was achieved, incision was made   in the right deltopectoral groove.  The brachiocephalic vein was identified and   proximally and distally controlled.  A single lumen Emmanuel catheter was   brought in through a stab incision inferior lateral to the deltopectoral groove   incision and then introduced through an anterior venotomy into the superior vena   cava under fluoroscopic guidance.  The catheter was then noted to aspirate   blood freely and was flushed with heparinized solution.  The brachiocephalic was   proximally and distally ligated gently with a 3-0 silk suture.  The catheter   was secured to the skin with a nylon suture and the deltopectoral groove   incision was closed in 2 layers with absorbable suture.  Needle, sponge and   instrument counts were correct.  The patient tolerated the procedure well and   was stable at the completion of the operation.      GF/HN  dd: 03/17/2017 11:38:17 (CDT)  td: 03/17/2017 12:28:57 (CDT)  Doc ID   #[id number]  Job ID #[id number]    CC:   
Dictation #1  MRN:[id number]  CSN:[id number]    
no

## 2025-01-02 NOTE — PROGRESS NOTES
Mariya Milner  : 1958  Primary: Medicare Part A And B (Medicare)  Secondary: [employer] SENIOR MEDICARE SUPP [facility] [place] [address] [place] [address]  Phone: [phone]  Fax: [phone] Plan Frequency: 2x a week for 8weeks    Plan of Care/Certification Expiration Date: 25        Plan of Care/Certification Expiration Date:  Plan of Care/Certification Expiration Date: 25    Frequency/Duration:   Plan Frequency: 2x a week for 8weeks      Time In/Out:   Time In: 1300  Time Out: 1345      PT Visit Info:         Visit Count:  6    OUTPATIENT PHYSICAL THERAPY:   Treatment Note 2025       Episode  (Right shoulder pain, Ad Cap)               Treatment Diagnosis:    Right shoulder pain, unspecified chronicity  Decreased range of motion of shoulder, right  Medical/Referring Diagnosis:    Right shoulder pain, unspecified chronicity  Decreased range of motion of shoulder, right      Referring Physician:  Ana Ornelas MD MD Orders:  PT Eval and Treat     Date of Onset:  Onset Date:  (about 3 months ago)     Allergies:   Sulfa antibiotics  Restrictions/Precautions:   None      Interventions Planned (Treatment may consist of any combination of the following):     See Assessment Note    Subjective Comments: Patient reports moving better and less soreness    Initial Pain Level::   sore   /10  Post Session Pain Level:     sore   /10  Medications Last Reviewed:  2025    Updated Objective Findings:    None today          Treatment     Treatment   THERAPEUTIC EXERCISE: (40 minutes):    Exercises per grid below to improve mobility, strength, balance, coordination, and dynamic movement of generalized knee to improve functional bending, lifting, and carrying.  Required minimal visual, verbal, and manual cues to promote proper body alignment, promote proper body posture, and promote proper body mechanics.  Progressed resistance, range, repetitions, and  Progress Note  HTS    Admit Date: 3/3/2017   LOS: 8 days     SUBJECTIVE:     Patient seen and examined. Refusing labs and PO meds for totally unclear reasons. Seems to be upset about not being able to sleep but his thoughts are flighty at best and don't make much sense.     Scheduled Meds:   ceFEPime (MAXIPIME) IVPB  2 g Intravenous Daily    coenzyme Q10  400 mg Oral Daily    hydrALAZINE  25 mg Oral Q8H    isosorbide dinitrate  10 mg Oral TID    levetiracetam  750 mg Oral BID    levocarnitine  330 mg Oral BID    senna-docusate 8.6-50 mg  2 tablet Oral BID    sodium chloride 0.9%  250 mL Intravenous Once    sodium chloride 0.9%  3 mL Intravenous Q8H    vancomycin (VANCOCIN) IVPB  20 mg/kg (Dosing Weight) Intravenous Q24H    warfarin  6 mg Oral Daily     Continuous Infusions:   DOBUTamine 2.5 mcg/kg/min (03/11/17 0900)    heparin (porcine) in D5W 14 Units/kg/hr (03/11/17 0900)     PRN Meds:alprazolam, heparin (PORCINE), heparin (PORCINE)    Review of patient's allergies indicates:   Allergen Reactions    Aspirin     Bactrim [sulfamethoxazole-trimethoprim]     Depakote [divalproex]     Dilantin [phenytoin sodium extended]     Lorazepam     Phenobarbital     Tegretol [carbamazepine]     Thallium-201        OBJECTIVE:     Vital Signs (Most Recent)  Temp:  (refused) (03/11/17 0700)  Pulse: (!) 111 (03/11/17 0915)  Resp: (!) 30 (03/11/17 0915)  BP: 103/63 (03/11/17 0730)  SpO2:  (pt refusing) (03/11/17 0700)    Vital Signs Range (Last 24H):  Temp:  [98 °F (36.7 °C)-98.6 °F (37 °C)]   Pulse:  []   Resp:  [17-93]   BP: ()/(53-70)   SpO2:  [97 %-100 %]     I & O (Last 24H):    Intake/Output Summary (Last 24 hours) at 03/11/17 0957  Last data filed at 03/11/17 0900   Gross per 24 hour   Intake              831 ml   Output              600 ml   Net              231 ml       Physical Exam:   General: Patient in no acute distress or discomfort  HEENT: No JVD, moist mucous membranes  Cardiac: S1S2  RRR  Chest: CTABL, no wheezing or rales  Abd:Soft NTND  Ext: No Edema No swelling  Neuro: A and O X 3, non focal    LABS  CBC with Diff:     Recent Labs  Lab 03/09/17  0442 03/10/17  0507 03/10/17  0744   WBC 11.32 24.22* 17.26*   HGB 14.1 12.8* 12.1*   HCT 41.6 37.7* 35.2*   * 156 159   LYMPH 14.9*  1.7 7.9*  1.9 8.7*  1.5   MONO 13.1  1.5* 6.4  1.6* 9.4  1.6*   EOSINOPHIL 0.8 0.2 0.7       COAG:    Recent Labs  Lab 03/08/17  0510 03/09/17  0442 03/10/17  0507   INR 1.1 1.1 1.1       CMP:     Recent Labs  Lab 03/08/17  0510 03/09/17  0442 03/10/17  0507   * 137* 141*   CALCIUM 10.2 9.5 9.2   ALBUMIN 3.5 3.2* 3.0*   PROT 8.1 7.3 6.9   * 131* 130*   K 5.1 5.0 4.8   CO2 32* 27 25   CL 83* 89* 91*   BUN 73* 71* 68*   CREATININE 2.6* 2.4* 2.4*   ALKPHOS 220* 193* 171*   * 128* 117*   AST 57* 51* 357*   BILITOT 3.2* 2.8* 2.8*   MG 2.5 2.3 2.4   PHOS 4.7* 4.4 3.6     Estimated Creatinine Clearance: 31.4 mL/min (based on Cr of 2.4).    .  No results for input(s): CPK, TROPONINI, MB, BNP in the last 168 hours.    Present on Admission:   (Resolved) Cardiogenic shock   MELAS (mitochondrial encephalopathy, lactic acidosis and stroke-like episodes)   Atrial flutter   NICM (nonischemic cardiomyopathy)   Seizure disorder   CKD (chronic kidney disease) stage 4, GFR 15-29 ml/min   Thrombocytopenia   Coagulopathy   Transaminitis   Hypoalbuminemia   (Resolved) Cardiogenic shock   Cognitive impairment      ASSESSMENT / PLAN:   35 Y/O M gentleman with PMH of HFrEF (EF <10%) 2/2 NICM s/p AICD (implanted 12/2013), MELAS (mitochondrial encephalopathy, lactic acidosis, and stroke-like episodes), Hx of LV thrombus, Atrial Flutter, CKD, seizure disorder, who is now transferred to [facility] from [facility] in cardiogenic shock and consideration for heart transplant and/or advanced options. IABP now out and decreasing support    -NICM now in Cardiogenic shock:  Continue  at 2.5mcg/kg/min  Holding lasix today,  hope to start back tomorrow PO  Concern for advanced options given lack of insight, poor IQ, lack of renal recovery and refusal of care  CTs completed  Hydralazine 25mg and isordil 10mg, both TID, refusing at this time    Leukocytosis  --increased SvO2  --pan culture, started vanc/cefepime 3/10  --consult ID  --no need for ICU care at this time although he had a bad experience on floor but cannot fully explain what happened that upset him  --US with sludge; CXR with no pneumonia    Cerebral Air embolism  EEG normal   Cannot do MRI 2/2 ICD  Appreciate stroke team assistance  Continue heparin    Atrial FLutter   S/p RENÉE/ DCCV currnetly in sinus  C/W Heparin for anticoagualtion  Warfarin at 6mg, refusing labs so will not further increase today  Follow up with Dr. Reyez 4 weeks after DC    SOILA  Appreciated [facility] Neurology consult, no contraindication to proceed with evaluation for advanced options  C/W home medications Kepra, L carnitine and Co enz Q  Checking keppra level    Cardiac diet with boost  Full Code  PT/OT    Further recommendations per attending addendum; floor today as he has no needs for ICU    Sergio Pavon MD  PGY-4 [phone])  Cardiology Fellow

## (undated) DEVICE — ADHESIVE DERMABOND ADVANCED

## (undated) DEVICE — DRESSING TEGADERM CHG 4X4.5